# Patient Record
Sex: MALE | Race: WHITE | Employment: OTHER | URBAN - METROPOLITAN AREA
[De-identification: names, ages, dates, MRNs, and addresses within clinical notes are randomized per-mention and may not be internally consistent; named-entity substitution may affect disease eponyms.]

---

## 2017-04-01 ENCOUNTER — HOSPITAL ENCOUNTER (INPATIENT)
Age: 82
LOS: 6 days | Discharge: NURSING, REHAB OR LONG-TERM CARE | DRG: 481 | End: 2017-04-07
Attending: EMERGENCY MEDICINE | Admitting: INTERNAL MEDICINE
Payer: MEDICARE

## 2017-04-01 ENCOUNTER — APPOINTMENT (OUTPATIENT)
Dept: GENERAL RADIOLOGY | Age: 82
DRG: 481 | End: 2017-04-01
Attending: EMERGENCY MEDICINE
Payer: MEDICARE

## 2017-04-01 DIAGNOSIS — N19 RENAL FAILURE: ICD-10-CM

## 2017-04-01 DIAGNOSIS — N39.0 URINARY TRACT INFECTION WITHOUT HEMATURIA, SITE UNSPECIFIED: Primary | ICD-10-CM

## 2017-04-01 DIAGNOSIS — R60.0 BILATERAL EDEMA OF LOWER EXTREMITY: ICD-10-CM

## 2017-04-01 PROBLEM — N17.9 ACUTE KIDNEY INJURY (HCC): Status: ACTIVE | Noted: 2017-04-01

## 2017-04-01 LAB
ALBUMIN SERPL BCP-MCNC: 2.7 G/DL (ref 3.5–5)
ALBUMIN/GLOB SERPL: 0.6 {RATIO} (ref 1.1–2.2)
ALP SERPL-CCNC: 152 U/L (ref 45–117)
ALT SERPL-CCNC: 18 U/L (ref 12–78)
ANION GAP BLD CALC-SCNC: 11 MMOL/L (ref 5–15)
APPEARANCE UR: ABNORMAL
AST SERPL W P-5'-P-CCNC: 27 U/L (ref 15–37)
ATRIAL RATE: 74 BPM
BACTERIA URNS QL MICRO: ABNORMAL /HPF
BASOPHILS # BLD AUTO: 0.1 K/UL (ref 0–0.1)
BASOPHILS # BLD: 1 % (ref 0–1)
BILIRUB SERPL-MCNC: 0.9 MG/DL (ref 0.2–1)
BILIRUB UR QL: NEGATIVE
BNP SERPL-MCNC: 117 PG/ML (ref 0–100)
BUN SERPL-MCNC: 50 MG/DL (ref 6–20)
BUN/CREAT SERPL: 24 (ref 12–20)
CALCIUM SERPL-MCNC: 8.3 MG/DL (ref 8.5–10.1)
CALCULATED P AXIS, ECG09: 46 DEGREES
CALCULATED R AXIS, ECG10: -41 DEGREES
CALCULATED T AXIS, ECG11: 13 DEGREES
CHLORIDE SERPL-SCNC: 111 MMOL/L (ref 97–108)
CK SERPL-CCNC: 259 U/L (ref 39–308)
CO2 SERPL-SCNC: 23 MMOL/L (ref 21–32)
COLOR UR: ABNORMAL
CREAT SERPL-MCNC: 2.08 MG/DL (ref 0.7–1.3)
DIAGNOSIS, 93000: NORMAL
DIFFERENTIAL METHOD BLD: ABNORMAL
EOSINOPHIL # BLD: 0 K/UL (ref 0–0.4)
EOSINOPHIL NFR BLD: 0 % (ref 0–7)
EPITH CASTS URNS QL MICRO: ABNORMAL /LPF
ERYTHROCYTE [DISTWIDTH] IN BLOOD BY AUTOMATED COUNT: 21.1 % (ref 11.5–14.5)
GLOBULIN SER CALC-MCNC: 4.5 G/DL (ref 2–4)
GLUCOSE SERPL-MCNC: 97 MG/DL (ref 65–100)
GLUCOSE UR STRIP.AUTO-MCNC: NEGATIVE MG/DL
HCT VFR BLD AUTO: 32.2 % (ref 36.6–50.3)
HGB BLD-MCNC: 10.1 G/DL (ref 12.1–17)
HGB UR QL STRIP: ABNORMAL
KETONES UR QL STRIP.AUTO: NEGATIVE MG/DL
LEUKOCYTE ESTERASE UR QL STRIP.AUTO: ABNORMAL
LYMPHOCYTES # BLD AUTO: 13 % (ref 12–49)
LYMPHOCYTES # BLD: 1.4 K/UL (ref 0.8–3.5)
MCH RBC QN AUTO: 29.2 PG (ref 26–34)
MCHC RBC AUTO-ENTMCNC: 31.4 G/DL (ref 30–36.5)
MCV RBC AUTO: 93.1 FL (ref 80–99)
MONOCYTES # BLD: 1.8 K/UL (ref 0–1)
MONOCYTES NFR BLD AUTO: 17 % (ref 5–13)
NEUTS SEG # BLD: 7.2 K/UL (ref 1.8–8)
NEUTS SEG NFR BLD AUTO: 69 % (ref 32–75)
NITRITE UR QL STRIP.AUTO: NEGATIVE
P-R INTERVAL, ECG05: 158 MS
PH UR STRIP: 5.5 [PH] (ref 5–8)
PLATELET # BLD AUTO: 333 K/UL (ref 150–400)
POTASSIUM SERPL-SCNC: 4.2 MMOL/L (ref 3.5–5.1)
PROT SERPL-MCNC: 7.2 G/DL (ref 6.4–8.2)
PROT UR STRIP-MCNC: 30 MG/DL
Q-T INTERVAL, ECG07: 430 MS
QRS DURATION, ECG06: 128 MS
QTC CALCULATION (BEZET), ECG08: 477 MS
RBC # BLD AUTO: 3.46 M/UL (ref 4.1–5.7)
RBC #/AREA URNS HPF: ABNORMAL /HPF (ref 0–5)
RBC MORPH BLD: ABNORMAL
RBC MORPH BLD: ABNORMAL
SODIUM SERPL-SCNC: 145 MMOL/L (ref 136–145)
SP GR UR REFRACTOMETRY: 1.01 (ref 1–1.03)
TROPONIN I SERPL-MCNC: <0.04 NG/ML
UROBILINOGEN UR QL STRIP.AUTO: 0.2 EU/DL (ref 0.2–1)
VENTRICULAR RATE, ECG03: 74 BPM
WBC # BLD AUTO: 10.5 K/UL (ref 4.1–11.1)
WBC URNS QL MICRO: >100 /HPF (ref 0–4)

## 2017-04-01 PROCEDURE — 74011250636 HC RX REV CODE- 250/636: Performed by: EMERGENCY MEDICINE

## 2017-04-01 PROCEDURE — 36415 COLL VENOUS BLD VENIPUNCTURE: CPT | Performed by: EMERGENCY MEDICINE

## 2017-04-01 PROCEDURE — 94762 N-INVAS EAR/PLS OXIMTRY CONT: CPT

## 2017-04-01 PROCEDURE — 65270000032 HC RM SEMIPRIVATE

## 2017-04-01 PROCEDURE — 80053 COMPREHEN METABOLIC PANEL: CPT | Performed by: EMERGENCY MEDICINE

## 2017-04-01 PROCEDURE — 83880 ASSAY OF NATRIURETIC PEPTIDE: CPT | Performed by: EMERGENCY MEDICINE

## 2017-04-01 PROCEDURE — 81001 URINALYSIS AUTO W/SCOPE: CPT | Performed by: EMERGENCY MEDICINE

## 2017-04-01 PROCEDURE — 74011250637 HC RX REV CODE- 250/637: Performed by: INTERNAL MEDICINE

## 2017-04-01 PROCEDURE — 93970 EXTREMITY STUDY: CPT

## 2017-04-01 PROCEDURE — 74011000258 HC RX REV CODE- 258: Performed by: EMERGENCY MEDICINE

## 2017-04-01 PROCEDURE — 74011250636 HC RX REV CODE- 250/636: Performed by: INTERNAL MEDICINE

## 2017-04-01 PROCEDURE — 82550 ASSAY OF CK (CPK): CPT | Performed by: EMERGENCY MEDICINE

## 2017-04-01 PROCEDURE — 74011000258 HC RX REV CODE- 258: Performed by: INTERNAL MEDICINE

## 2017-04-01 PROCEDURE — 99285 EMERGENCY DEPT VISIT HI MDM: CPT

## 2017-04-01 PROCEDURE — 71010 XR CHEST PORT: CPT

## 2017-04-01 PROCEDURE — 84484 ASSAY OF TROPONIN QUANT: CPT | Performed by: EMERGENCY MEDICINE

## 2017-04-01 PROCEDURE — 93005 ELECTROCARDIOGRAM TRACING: CPT

## 2017-04-01 PROCEDURE — 85025 COMPLETE CBC W/AUTO DIFF WBC: CPT | Performed by: EMERGENCY MEDICINE

## 2017-04-01 RX ORDER — DOCUSATE SODIUM 100 MG/1
100 CAPSULE, LIQUID FILLED ORAL DAILY
COMMUNITY
End: 2018-01-31

## 2017-04-01 RX ORDER — FLUTICASONE PROPIONATE 50 MCG
2 SPRAY, SUSPENSION (ML) NASAL DAILY
COMMUNITY

## 2017-04-01 RX ORDER — ZOLPIDEM TARTRATE 5 MG/1
5 TABLET ORAL
Status: DISCONTINUED | OUTPATIENT
Start: 2017-04-01 | End: 2017-04-07 | Stop reason: HOSPADM

## 2017-04-01 RX ORDER — CLOBETASOL PROPIONATE 0.5 MG/G
CREAM TOPICAL 2 TIMES DAILY
COMMUNITY
End: 2017-07-22

## 2017-04-01 RX ORDER — DOCUSATE SODIUM 100 MG/1
100 CAPSULE, LIQUID FILLED ORAL DAILY
Status: DISCONTINUED | OUTPATIENT
Start: 2017-04-02 | End: 2017-04-07 | Stop reason: HOSPADM

## 2017-04-01 RX ORDER — ACETAMINOPHEN 325 MG/1
500 TABLET ORAL 2 TIMES DAILY
COMMUNITY
End: 2018-01-31

## 2017-04-01 RX ORDER — SODIUM CHLORIDE 0.9 % (FLUSH) 0.9 %
5-10 SYRINGE (ML) INJECTION EVERY 8 HOURS
Status: DISCONTINUED | OUTPATIENT
Start: 2017-04-01 | End: 2017-04-07 | Stop reason: HOSPADM

## 2017-04-01 RX ORDER — TRIAZOLAM 0.12 MG/1
0.12 TABLET ORAL
COMMUNITY
End: 2017-07-22

## 2017-04-01 RX ORDER — SODIUM CHLORIDE 0.9 % (FLUSH) 0.9 %
5-10 SYRINGE (ML) INJECTION AS NEEDED
Status: DISCONTINUED | OUTPATIENT
Start: 2017-04-01 | End: 2017-04-07 | Stop reason: HOSPADM

## 2017-04-01 RX ORDER — HEPARIN SODIUM 5000 [USP'U]/ML
5000 INJECTION, SOLUTION INTRAVENOUS; SUBCUTANEOUS EVERY 8 HOURS
Status: DISCONTINUED | OUTPATIENT
Start: 2017-04-01 | End: 2017-04-05 | Stop reason: SDUPTHER

## 2017-04-01 RX ORDER — ACETAMINOPHEN 500 MG
500 TABLET ORAL DAILY
COMMUNITY
End: 2017-07-13 | Stop reason: SDUPTHER

## 2017-04-01 RX ORDER — ROSUVASTATIN CALCIUM 10 MG/1
20 TABLET, COATED ORAL
Status: DISCONTINUED | OUTPATIENT
Start: 2017-04-01 | End: 2017-04-07 | Stop reason: HOSPADM

## 2017-04-01 RX ORDER — PANTOPRAZOLE SODIUM 40 MG/1
40 TABLET, DELAYED RELEASE ORAL
Status: DISCONTINUED | OUTPATIENT
Start: 2017-04-01 | End: 2017-04-07 | Stop reason: HOSPADM

## 2017-04-01 RX ORDER — ACETAMINOPHEN 325 MG/1
650 TABLET ORAL
Status: DISCONTINUED | OUTPATIENT
Start: 2017-04-01 | End: 2017-04-06

## 2017-04-01 RX ORDER — FLUTICASONE PROPIONATE 50 MCG
2 SPRAY, SUSPENSION (ML) NASAL DAILY
Status: DISCONTINUED | OUTPATIENT
Start: 2017-04-02 | End: 2017-04-07 | Stop reason: HOSPADM

## 2017-04-01 RX ORDER — MENTHOL AND ZINC OXIDE .44; 20.625 G/100G; G/100G
OINTMENT TOPICAL AS NEEDED
COMMUNITY

## 2017-04-01 RX ORDER — LANOLIN ALCOHOL/MO/W.PET/CERES
325 CREAM (GRAM) TOPICAL 2 TIMES DAILY WITH MEALS
Status: DISCONTINUED | OUTPATIENT
Start: 2017-04-01 | End: 2017-04-07 | Stop reason: HOSPADM

## 2017-04-01 RX ORDER — METOPROLOL TARTRATE 25 MG/1
12.5 TABLET, FILM COATED ORAL 2 TIMES DAILY
Status: DISCONTINUED | OUTPATIENT
Start: 2017-04-01 | End: 2017-04-07 | Stop reason: HOSPADM

## 2017-04-01 RX ADMIN — SODIUM CHLORIDE 2000 ML: 900 INJECTION, SOLUTION INTRAVENOUS at 15:13

## 2017-04-01 RX ADMIN — HEPARIN SODIUM 5000 UNITS: 5000 INJECTION, SOLUTION INTRAVENOUS; SUBCUTANEOUS at 16:29

## 2017-04-01 RX ADMIN — ROSUVASTATIN CALCIUM 20 MG: 10 TABLET, FILM COATED ORAL at 21:23

## 2017-04-01 RX ADMIN — PANTOPRAZOLE SODIUM 40 MG: 40 TABLET, DELAYED RELEASE ORAL at 18:29

## 2017-04-01 RX ADMIN — Medication 10 ML: at 16:30

## 2017-04-01 RX ADMIN — ZOLPIDEM TARTRATE 5 MG: 5 TABLET, FILM COATED ORAL at 21:23

## 2017-04-01 RX ADMIN — HEPARIN SODIUM 5000 UNITS: 5000 INJECTION, SOLUTION INTRAVENOUS; SUBCUTANEOUS at 23:53

## 2017-04-01 RX ADMIN — FERROUS SULFATE TAB 325 MG (65 MG ELEMENTAL FE) 325 MG: 325 (65 FE) TAB at 18:29

## 2017-04-01 RX ADMIN — CEFTRIAXONE 1 G: 1 INJECTION, POWDER, FOR SOLUTION INTRAMUSCULAR; INTRAVENOUS at 16:29

## 2017-04-01 RX ADMIN — PIPERACILLIN SODIUM AND TAZOBACTAM SODIUM 3.38 G: 3; .375 INJECTION, POWDER, LYOPHILIZED, FOR SOLUTION INTRAVENOUS at 15:15

## 2017-04-01 RX ADMIN — Medication 10 ML: at 21:25

## 2017-04-01 RX ADMIN — ACETAMINOPHEN 650 MG: 325 TABLET, FILM COATED ORAL at 18:34

## 2017-04-01 NOTE — ED TRIAGE NOTES
Per EMS pt slept in his recliner all night because he was unable to get comfortable in the bed due to new mattress. Pt presents with bilateral leg and feet swelling and C/O pain to right leg. Pt denies feeling SOB or having chest pain. Pt arrived with condom cath that has fallen off. Pt urine is foul smelling.

## 2017-04-01 NOTE — H&P
History and Physical    Subjective:     Patient is an 45-year-old white male who lives in assisted living with his wife. He states that he does not walk at all, and uses a wheelchair. Patient presents with complaints of right leg pain. Patient endorses that he has trouble moving his legs due to chronic swelling/lymphedema. Patient complains of right leg pain from knee to ankle, which appears to be chronic since right knee replacement in 2014, perhaps a little worse than baseline. He states that his appetite is good. Patient denies chest pain, abdominal pain, nausea, vomiting, diarrhea. Past Medical History:   Diagnosis Date    Arrhythmia     Arthritis     Anemia of iron deficiency and chronic disease     Hypercholesteremia     Hypertension     Chronic kidney disease, stage III. Baseline creatinine appears to be about 1.7-1.8     GERD       Past Surgical History:   Procedure Laterality Date    HX BACK SURGERY  1974    HX left CATARACT REMOVAL      HX COLONOSCOPY      HX HERNIA REPAIR, double inguinal  1975    HX TONSILLECTOMY      UPPER GI ENDOSCOPY,BIOPSY  3/22/2013          Family History   Problem Relation Age of Onset    Stroke Mother     Heart Disease Father     Cancer Brother      lung    Other Brother      NON ALCOHOLIC CIRRHOSIS      Social History   Substance Use Topics    Smoking status: Former Smoker     Years: 0.5 ppd x 20 years     Quit date: 1976    Smokeless tobacco: Never    Alcohol use No    Recreational drug use Never       Prior to Admission medications    Medication Sig Start Date End Date Taking? Authorizing Provider   amitriptyline (ELAVIL) 25 mg tablet Take 25 mg by mouth two (2) times a day. Historical Provider   ferrous sulfate 325 mg (65 mg iron) tablet Take 1 tablet by mouth two (2) times daily (with meals). 1/28/15   Reese JAMES Do, MD   pantoprazole (PROTONIX) 40 mg tablet Take 1 tablet by mouth Before breakfast and dinner.   Patient taking differently: Take 40 mg by mouth daily. 1/28/15   Reese JAMES Do, MD   metoprolol (LOPRESSOR) 25 mg tablet Take 12.5 mg by mouth two (2) times a day. Historical Provider   rosuvastatin (CRESTOR) 20 mg tablet Take 20 mg by mouth nightly. Historical Provider   triazolam (HALCION) 0.25 mg tablet Take 0.125 mg by mouth nightly. Historical Provider   mometasone (NASONEX) 50 mcg/actuation nasal spray 2 sprays by Both Nostrils route daily as needed. Historical Provider     No Known Allergies     Review of Systems:  A comprehensive review of systems was negative except for that written in the History of Present Illness. Objective: Intake and Output:            Physical Exam:   Visit Vitals    /65    Pulse 96    Temp 98.4 °F (36.9 °C)    Resp 18    Ht 5' 7\" (1.702 m)    Wt 68 kg (150 lb)    SpO2 97%    BMI 23.49 kg/m2     General:  Alert, cooperative, no distress, appears stated age. Head:  Normocephalic, without obvious abnormality, atraumatic. Eyes:  Conjunctivae/corneas clear. PERRL, EOMs intact. Nose: Nares normal. Septum midline. Mucosa normal. No drainage or sinus tenderness. Throat: Lips, mucosa, and tongue normal. Teeth and gums normal.   Neck: Supple, symmetrical, trachea midline, no adenopathy, thyroid: no enlargement/tenderness/nodules, no carotid bruit and no JVD. Lungs:   Clear to auscultation bilaterally. Chest wall:  No tenderness or deformity. Heart:  Regular rate and rhythm, S1, S2 normal, no murmur, click, rub or gallop. Abdomen:   Soft, non-tender. Bowel sounds normal. No masses,  No organomegaly. Extremities: 2+ pitting edema in the lower extremities; no cyanosis or clubbing. Feet are warm to touch   Pulses: 2+ and symmetric all extremities. Skin: Skin turgor normal. Pale. Lymph nodes: Cervical, supraclavicular, and axillary nodes normal.   Neurologic: CNII-XII intact. Patient is following commands and answering questions appropriately.  He demonstrates 2/5 strength in the lower extremities and 5/5 strength in upper extremities. No dysarthria. Data Review:          Result Value    WBC 10.5    RBC 3.46 (L)    HGB 10.1 (L)    HCT 32.2 (L)    MCV 93.1    MCH 29.2    MCHC 31.4    RDW 21.1 (H)    PLATELET 398    NEUTROPHILS 69    LYMPHOCYTES 13    MONOCYTES 17 (H)    EOSINOPHILS 0    BASOPHILS 1    ABS. NEUTROPHILS 7.2    ABS. LYMPHOCYTES 1.4    ABS. MONOCYTES 1.8 (H)    ABS. EOSINOPHILS 0.0    ABS. BASOPHILS 0.1    DF SMEAR SCANNED    RBC COMMENTS ANISOCYTOSIS  2+      RBC COMMENTS OVALOCYTES  PRESENT        Result Value    Sodium 145    Potassium 4.2    Chloride 111 (H)    CO2 23    Anion gap 11    Glucose 97    BUN 50 (H)    Creatinine 2.08 (H)    BUN/Creatinine ratio 24 (H)    GFR est AA 37 (L)    GFR est non-AA 31 (L)    Calcium 8.3 (L)    Bilirubin, total 0.9    ALT (SGPT) 18    AST (SGOT) 27    Alk. phosphatase 152 (H)    Protein, total 7.2    Albumin 2.7 (L)    Globulin 4.5 (H)    A-G Ratio 0.6 (L)      Result Value    Troponin-I, Qt. <0.04      Result Value          Result Value    Color YELLOW/STRAW    Appearance TURBID (A)    Specific gravity 1.015    pH (UA) 5.5    Protein 30 (A)    Glucose NEGATIVE     Ketone NEGATIVE     Bilirubin NEGATIVE     Blood MODERATE (A)    Urobilinogen 0.2    Nitrites NEGATIVE     Leukocyte Esterase LARGE (A)    WBC >100 (H)    RBC 0-5    Epithelial cells MODERATE (A)    Bacteria 1+ (A)      Result Value     (H)     Chest xray, as reviewed and visualized: no acute cardiopulmonary process. Echocardiogram of November, 2016:  Left ventricle size and  systolic function was normal. Left ventricle ejection fraction was estimated in the range of 55 % to 60 %. Suboptimal endocardial visualization limits wall motion analysis. Wall thickness was normal. RIGHT VENTRICLE size, wall thickness and  systolic function was normal. Both atria have normal size.  MITRAL VALVE has mild annular calcification, normal valve structure, and  normal leaflet separation. There was no evidence for stenosis or regurgitation. AORTIC VALVE was trileaflet, exhibited normal thickness and normal cuspal separation. There was no aortic stenosis or regurgitation. TRICUSPID VALVE has normal valve structure, exhibiting  normal leaflet separation. There was no evidence for tricuspid stenosis or regurgitation. PULMONIC VALVE leaflets exhibited normal thickness, no calcification, and normal cuspal separation. There was no pulmonic regurgitation. Aortic root exhibited normal size. There was no pericardial effusion. Ultrasound of bilateral lower extremities: no acute deep in thrombosis. Assessment:         Plan:     Assessment/plan:  1. Urinary tract infection. IV ceftriaxone. 2.  Chronic kidney disease, stage III. Renal function is slightly worse than baseline. However, due to edema in the lower extremities, no IV fluids are started. Patient may still be intravascularly depleted. He endorses good appetite. Continue to monitor renal function. 3. Chronic lymphedema of the lower extremities. Ultrasound has ruled out acute deep in thrombosis. Echocardiogram from a few months ago (November, 2016) is essentially normal. ProBNP is in normal range. This is not related to heart failure. 4. Deconditioning. Consider physical therapy. However, it is doubtful how much progress patient will make with long-term immobility/stability. 5. Anemia of chronic disease. No indication for transfusion at this time.     Signed By: Fadumo Tian DO     April 1, 2017

## 2017-04-01 NOTE — ED NOTES
Pt noted to have sats 78% while asleep. Pt awakens easily and sats begin to rise. O2 applied at 2L/nc. Continue to monitor.

## 2017-04-01 NOTE — ED PROVIDER NOTES
HPI Comments: [de-identified] y.o. male with past medical history significant for Hypertension, Arthritis, and Hypercholesterolemia who presents from Assisted Living via EMS with chief complaint of Right Leg Pain. Per EMS patient slept in his recliner all night because he was unable to get comfortable in the bed due to new mattress. Patient states he has been sleeping in this chair \"for several weeks\". Patient states constant right lower leg pain described as \"aching\". Pt states associated bilateral leg and feet swelling. Patient states he ambulates with the assistance of a wheel chair at baseline. Patient has a previous history of right knee replacement in 2014. Pt reports recent onset of increased weakness in his bilateral legs described as \"now no longer able to stand\". Pt denies fever, chills, cough, congestion, SOB, chest pain, abdominal pain, nausea, vomiting, diarrhea, difficulty urinating, or dysuria. Pt denies any other acute medical complaints. There are no other acute medical concerns at this time. PCP: Brodie Gil MD    Note written by Isabel Gagnon, as dictated by Jess Stephen MD 1:48 PM    The history is provided by the patient.         Past Medical History:   Diagnosis Date    Arrhythmia     RAPID HEARTBEAT    Arthritis     Constipation     Hearing loss     Heart disease     Hypercholesteremia     Hypertension     Joint pain     Memory disorder        Past Surgical History:   Procedure Laterality Date    HX BACK SURGERY  1974    HX CATARACT REMOVAL      LEFT    HX COLONOSCOPY      HX HERNIA REPAIR  1975    double    HX TONSILLECTOMY      UPPER GI ENDOSCOPY,BIOPSY  3/22/2013              Family History:   Problem Relation Age of Onset    Stroke Mother     Heart Disease Father     Cancer Brother      lung    Other Brother      NON ALCOHOLIC CIRRHOSIS       Social History     Social History    Marital status:      Spouse name: N/A    Number of children: N/A    Years of education: N/A     Occupational History    Not on file. Social History Main Topics    Smoking status: Former Smoker     Years: 16.00     Quit date: 1/23/1971    Smokeless tobacco: Not on file    Alcohol use No    Drug use: No    Sexual activity: Not on file     Other Topics Concern    Not on file     Social History Narrative         ALLERGIES: Review of patient's allergies indicates no known allergies. Review of Systems   Constitutional: Negative for chills and fever. HENT: Negative for congestion. Respiratory: Negative for cough and shortness of breath. Cardiovascular: Positive for leg swelling. Negative for chest pain. Gastrointestinal: Negative for abdominal pain, diarrhea, nausea and vomiting. Genitourinary: Negative for difficulty urinating and dysuria. Musculoskeletal: Positive for arthralgias. Neurological: Positive for weakness. All other systems reviewed and are negative. Vitals:    04/01/17 1326 04/01/17 1330 04/01/17 1345 04/01/17 1400   BP: 134/49 127/50 122/49 117/47   Pulse: 80 79 79 85   Resp: 14 23 30 20   Temp: 97.8 °F (36.6 °C)      SpO2: 97% 97% 95% (!) 78%   Weight: 68 kg (150 lb)      Height: 5' 7\" (1.702 m)               Physical Exam   Constitutional: He is oriented to person, place, and time. He appears well-developed and well-nourished. No distress. HENT:   Head: Normocephalic. Mouth/Throat: Mucous membranes are dry. Eyes: Pupils are equal, round, and reactive to light. Neck: Normal range of motion. Cardiovascular: Normal rate and regular rhythm. No murmur heard. Pulmonary/Chest: Effort normal and breath sounds normal. No respiratory distress. Decreased breath sounds bilaterally   Abdominal: Soft. There is no tenderness. Musculoskeletal: Normal range of motion. He exhibits edema. 3+ pitting edema bilaterally. Pulses intact   Neurological: He is alert and oriented to person, place, and time. He has normal strength.  No cranial nerve deficit. Skin: Skin is warm and dry. There is pallor. Psychiatric: He has a normal mood and affect. His behavior is normal.   Nursing note and vitals reviewed. Note written by Isabel Maldonado, as dictated by Kenzie Szymanski MD 1:49 PM      [facility]  ED Course       Procedures    ED EKG interpretation:  Rhythm: normal sinus rhythm; and regular . Rate (approx.): 74bpm; Axis: normal; ST/T wave: normal; No acute ST changes. No significant changes when compared to EKG 1/2  Note written by Isabel Maldonado, as dictated by Kenzie Szymanski MD 1:49 PM    CONSULT NOTE:  2:24 PM Kenzie Szymanski MD spoke with Dr. Trey Louie, Consult for Hospitalist.  Discussed available diagnostic tests and clinical findings. He is in agreement with care plans as outlined. Dr. Trey Louie will see and admit patient.

## 2017-04-01 NOTE — PROGRESS NOTES
Admission Medication Reconciliation:    Information obtained from:  Medication list provided by [facility] (dated 2017, reviewed on 3/29/2017 by nursing)    Comments/Recommendations: Added:  APAP 500 daily, Clobetasol cream, Docusate, Flonase, Risamine ointment  Removed:  Amitriptyline 25mg BID  Changed:  none    Prior to Admission Medications:   Prior to Admission Medications   Prescriptions Last Dose Informant Patient Reported? Taking? Menthol-Zinc Oxide (RISAMINE) 0.44-20.6 % oint   Yes Yes   Sig: Apply  to affected area as needed (with each diaper change). Indications: DIAPER RASH, SKIN IRRITATION   acetaminophen (TYLENOL) 325 mg tablet   Yes Yes   Sig: Take 650 mg by mouth every eight (8) hours as needed for Pain. acetaminophen (TYLENOL) 500 mg tablet   Yes Yes   Sig: Take 500 mg by mouth daily. clobetasol (TEMOVATE) 0.05 % topical cream   Yes Yes   Sig: Apply  to affected area two (2) times a day. Apply to leg rash. docusate sodium (COLACE) 100 mg capsule   Yes Yes   Sig: Take 100 mg by mouth daily. ferrous sulfate 325 mg (65 mg iron) tablet   No Yes   Sig: Take 1 tablet by mouth two (2) times daily (with meals). fluticasone (FLONASE) 50 mcg/actuation nasal spray   Yes Yes   Si Sprays by Both Nostrils route daily. metoprolol (LOPRESSOR) 25 mg tablet   Yes Yes   Sig: Take 12.5 mg by mouth two (2) times a day. pantoprazole (PROTONIX) 40 mg tablet   No Yes   Sig: Take 1 tablet by mouth Before breakfast and dinner. rosuvastatin (CRESTOR) 20 mg tablet   Yes Yes   Sig: Take 20 mg by mouth nightly. triazolam (HALCION) 0.125 mg tablet   Yes Yes   Sig: Take 0.125 mg by mouth nightly.       Facility-Administered Medications: None

## 2017-04-01 NOTE — ROUTINE PROCESS
TRANSFER - OUT REPORT:    Verbal report given to Mikey Arias RN(name) on Aidan Matthew  being transferred to 5E(unit) for routine progression of care       Report consisted of patients Situation, Background, Assessment and   Recommendations(SBAR). Information from the following report(s) SBAR, ED Summary, [facility] and Recent Results was reviewed with the receiving nurse. Lines:       Opportunity for questions and clarification was provided.       Patient transported with:   [employer]

## 2017-04-01 NOTE — PROCEDURES
[facility]  *** FINAL REPORT ***    Name: Lion Chen  MRN: [id number]  : 13 Sep 1935  HIS Order #: [id number]  [address] [facility] Visit #: [id number]  Date: 2017    TYPE OF TEST: Peripheral Venous Testing    REASON FOR TEST  Limb swelling    Right Leg:-  Deep venous thrombosis:           No  Superficial venous thrombosis:    No  Deep venous insufficiency:        Not examined  Superficial venous insufficiency: Not examined    Left Leg:-  Deep venous thrombosis:           No  Superficial venous thrombosis:    No  Deep venous insufficiency:        Not examined  Superficial venous insufficiency: Not examined      INTERPRETATION/FINDINGS  PROCEDURE:  Color duplex ultrasound imaging of lower extremity veins. FINDINGS:       Right: The common femoral, deep femoral, femoral, popliteal,  posterior tibial and great saphenous are patent and without evidence  of thrombus;  each is fully compressible and there is no narrowing of  the flow channel on color Doppler imaging. The peroneal vein was not  visualized. Phasic flow is observed in the common femoral vein. Left:   The common femoral, deep femoral, femoral, popliteal,  posterior tibial, peroneal and great saphenous are patent and without  evidence of thrombus;  each is fully compressible and there is no  narrowing of the flow channel on color Doppler imaging. Phasic flow  is observed in the common femoral vein. IMPRESSION:  No evidence of right or left lower extremity vein  thrombosis where visualized. ADDITIONAL COMMENTS    I have personally reviewed the data relevant to the interpretation of  this  study.     TECHNOLOGIST: Edouard Hardy RVT  Signed: 2017 02:31 PM    PHYSICIAN: Kris Newsome MD  Signed: 2017 07:01 AM

## 2017-04-01 NOTE — PROGRESS NOTES
Primary Nurse Parish Valladares and Scotty Sosa RN performed a dual skin assessment on this patient Impairment noted- see wound doc flow sheet. Pt has open areas on sacrum with mepilex dressings from ED, as well as excoriation on penis (per report, pt wore condom catheters at home). Wound consult placed.    Waldo score is 15

## 2017-04-01 NOTE — PROGRESS NOTES
Pt arrived from ED at approximately 1545. Alert, oriented x4, flat affect. Vitals stable, with the exception of slightly low BP 98/42. Pt finishing up 2L bolus started in ED. Pt wearing 2L O2 via nasal cannula, due to desats during sleep per ED nurse. Legs are edematous, elevated on pillows. Pt ordered for turn team q2h, as well as wound consult for areas of breakdown on sacrum/coccyx. Diapered due to incontinence. Continues on IV antibiotics for UTI. Will continue to monitor pt closely.

## 2017-04-02 LAB
ALBUMIN SERPL BCP-MCNC: 2 G/DL (ref 3.5–5)
ALBUMIN/GLOB SERPL: 0.6 {RATIO} (ref 1.1–2.2)
ALP SERPL-CCNC: 108 U/L (ref 45–117)
ALT SERPL-CCNC: 15 U/L (ref 12–78)
ANION GAP BLD CALC-SCNC: 9 MMOL/L (ref 5–15)
APPEARANCE UR: ABNORMAL
AST SERPL W P-5'-P-CCNC: 29 U/L (ref 15–37)
BACTERIA URNS QL MICRO: NEGATIVE /HPF
BASOPHILS # BLD AUTO: 0 K/UL (ref 0–0.1)
BASOPHILS # BLD: 0 % (ref 0–1)
BILIRUB SERPL-MCNC: 0.4 MG/DL (ref 0.2–1)
BILIRUB UR QL: NEGATIVE
BUN SERPL-MCNC: 48 MG/DL (ref 6–20)
BUN/CREAT SERPL: 26 (ref 12–20)
CALCIUM SERPL-MCNC: 7.6 MG/DL (ref 8.5–10.1)
CHLORIDE SERPL-SCNC: 117 MMOL/L (ref 97–108)
CO2 SERPL-SCNC: 22 MMOL/L (ref 21–32)
COLOR UR: ABNORMAL
CREAT SERPL-MCNC: 1.88 MG/DL (ref 0.7–1.3)
DIFFERENTIAL METHOD BLD: ABNORMAL
EOSINOPHIL # BLD: 0.1 K/UL (ref 0–0.4)
EOSINOPHIL NFR BLD: 1 % (ref 0–7)
EPITH CASTS URNS QL MICRO: ABNORMAL /LPF
ERYTHROCYTE [DISTWIDTH] IN BLOOD BY AUTOMATED COUNT: 21.4 % (ref 11.5–14.5)
FERRITIN SERPL-MCNC: 906 NG/ML (ref 26–388)
GLOBULIN SER CALC-MCNC: 3.3 G/DL (ref 2–4)
GLUCOSE SERPL-MCNC: 80 MG/DL (ref 65–100)
GLUCOSE UR STRIP.AUTO-MCNC: NEGATIVE MG/DL
HCT VFR BLD AUTO: 24.4 % (ref 36.6–50.3)
HCT VFR BLD AUTO: 24.5 % (ref 36.6–50.3)
HCT VFR BLD AUTO: 26.9 % (ref 36.6–50.3)
HGB BLD-MCNC: 7.7 G/DL (ref 12.1–17)
HGB BLD-MCNC: 7.7 G/DL (ref 12.1–17)
HGB BLD-MCNC: 8.5 G/DL (ref 12.1–17)
HGB UR QL STRIP: ABNORMAL
HYALINE CASTS URNS QL MICRO: ABNORMAL /LPF (ref 0–5)
IRON SATN MFR SERPL: 13 % (ref 20–50)
IRON SERPL-MCNC: 17 UG/DL (ref 35–150)
KETONES UR QL STRIP.AUTO: NEGATIVE MG/DL
LEUKOCYTE ESTERASE UR QL STRIP.AUTO: ABNORMAL
LYMPHOCYTES # BLD AUTO: 10 % (ref 12–49)
LYMPHOCYTES # BLD: 0.8 K/UL (ref 0.8–3.5)
MCH RBC QN AUTO: 29.2 PG (ref 26–34)
MCHC RBC AUTO-ENTMCNC: 31.4 G/DL (ref 30–36.5)
MCV RBC AUTO: 92.8 FL (ref 80–99)
MONOCYTES # BLD: 0.7 K/UL (ref 0–1)
MONOCYTES NFR BLD AUTO: 9 % (ref 5–13)
NEUTS BAND NFR BLD MANUAL: 3 % (ref 0–6)
NEUTS SEG # BLD: 6.2 K/UL (ref 1.8–8)
NEUTS SEG NFR BLD AUTO: 77 % (ref 32–75)
NITRITE UR QL STRIP.AUTO: NEGATIVE
PH UR STRIP: 5.5 [PH] (ref 5–8)
PLATELET # BLD AUTO: 277 K/UL (ref 150–400)
POTASSIUM SERPL-SCNC: 4 MMOL/L (ref 3.5–5.1)
PROT SERPL-MCNC: 5.3 G/DL (ref 6.4–8.2)
PROT UR STRIP-MCNC: ABNORMAL MG/DL
RBC # BLD AUTO: 2.64 M/UL (ref 4.1–5.7)
RBC #/AREA URNS HPF: ABNORMAL /HPF (ref 0–5)
RBC MORPH BLD: ABNORMAL
RETICS/RBC NFR AUTO: 1.5 % (ref 0.7–2.1)
SODIUM SERPL-SCNC: 148 MMOL/L (ref 136–145)
SP GR UR REFRACTOMETRY: 1.02 (ref 1–1.03)
TIBC SERPL-MCNC: 131 UG/DL (ref 250–450)
UA: UC IF INDICATED,UAUC: ABNORMAL
UROBILINOGEN UR QL STRIP.AUTO: 0.2 EU/DL (ref 0.2–1)
WBC # BLD AUTO: 7.8 K/UL (ref 4.1–11.1)
WBC URNS QL MICRO: >100 /HPF (ref 0–4)

## 2017-04-02 PROCEDURE — 77030011943

## 2017-04-02 PROCEDURE — 85025 COMPLETE CBC W/AUTO DIFF WBC: CPT

## 2017-04-02 PROCEDURE — 87086 URINE CULTURE/COLONY COUNT: CPT | Performed by: HOSPITALIST

## 2017-04-02 PROCEDURE — 77030011256 HC DRSG MEPILEX <16IN NO BORD MOLN -A

## 2017-04-02 PROCEDURE — 74011250636 HC RX REV CODE- 250/636: Performed by: INTERNAL MEDICINE

## 2017-04-02 PROCEDURE — 77030018846 HC SOL IRR STRL H20 ICUM -A

## 2017-04-02 PROCEDURE — 80053 COMPREHEN METABOLIC PANEL: CPT

## 2017-04-02 PROCEDURE — 65270000032 HC RM SEMIPRIVATE

## 2017-04-02 PROCEDURE — 87077 CULTURE AEROBIC IDENTIFY: CPT | Performed by: HOSPITALIST

## 2017-04-02 PROCEDURE — 87186 SC STD MICRODIL/AGAR DIL: CPT | Performed by: HOSPITALIST

## 2017-04-02 PROCEDURE — 81001 URINALYSIS AUTO W/SCOPE: CPT | Performed by: HOSPITALIST

## 2017-04-02 PROCEDURE — 85018 HEMOGLOBIN: CPT | Performed by: HOSPITALIST

## 2017-04-02 PROCEDURE — 83540 ASSAY OF IRON: CPT | Performed by: HOSPITALIST

## 2017-04-02 PROCEDURE — 74011250636 HC RX REV CODE- 250/636: Performed by: HOSPITALIST

## 2017-04-02 PROCEDURE — 74011250637 HC RX REV CODE- 250/637: Performed by: INTERNAL MEDICINE

## 2017-04-02 PROCEDURE — 36415 COLL VENOUS BLD VENIPUNCTURE: CPT

## 2017-04-02 PROCEDURE — 85045 AUTOMATED RETICULOCYTE COUNT: CPT | Performed by: HOSPITALIST

## 2017-04-02 PROCEDURE — 74011000258 HC RX REV CODE- 258: Performed by: INTERNAL MEDICINE

## 2017-04-02 PROCEDURE — 82728 ASSAY OF FERRITIN: CPT | Performed by: HOSPITALIST

## 2017-04-02 RX ORDER — SODIUM CHLORIDE 9 MG/ML
75 INJECTION, SOLUTION INTRAVENOUS CONTINUOUS
Status: DISCONTINUED | OUTPATIENT
Start: 2017-04-02 | End: 2017-04-03

## 2017-04-02 RX ADMIN — FERROUS SULFATE TAB 325 MG (65 MG ELEMENTAL FE) 325 MG: 325 (65 FE) TAB at 07:26

## 2017-04-02 RX ADMIN — Medication 10 ML: at 21:38

## 2017-04-02 RX ADMIN — PANTOPRAZOLE SODIUM 40 MG: 40 TABLET, DELAYED RELEASE ORAL at 06:30

## 2017-04-02 RX ADMIN — DOCUSATE SODIUM 100 MG: 100 CAPSULE, LIQUID FILLED ORAL at 10:35

## 2017-04-02 RX ADMIN — ACETAMINOPHEN 650 MG: 325 TABLET, FILM COATED ORAL at 21:59

## 2017-04-02 RX ADMIN — HEPARIN SODIUM 5000 UNITS: 5000 INJECTION, SOLUTION INTRAVENOUS; SUBCUTANEOUS at 15:04

## 2017-04-02 RX ADMIN — METOPROLOL TARTRATE 12.5 MG: 25 TABLET ORAL at 10:35

## 2017-04-02 RX ADMIN — ACETAMINOPHEN 650 MG: 325 TABLET, FILM COATED ORAL at 04:59

## 2017-04-02 RX ADMIN — FERROUS SULFATE TAB 325 MG (65 MG ELEMENTAL FE) 325 MG: 325 (65 FE) TAB at 17:33

## 2017-04-02 RX ADMIN — SODIUM CHLORIDE 75 ML/HR: 900 INJECTION, SOLUTION INTRAVENOUS at 12:40

## 2017-04-02 RX ADMIN — Medication 10 ML: at 06:34

## 2017-04-02 RX ADMIN — Medication 10 ML: at 21:37

## 2017-04-02 RX ADMIN — FLUTICASONE PROPIONATE 2 SPRAY: 50 SPRAY, METERED NASAL at 15:04

## 2017-04-02 RX ADMIN — PANTOPRAZOLE SODIUM 40 MG: 40 TABLET, DELAYED RELEASE ORAL at 17:33

## 2017-04-02 RX ADMIN — ZOLPIDEM TARTRATE 5 MG: 5 TABLET, FILM COATED ORAL at 21:37

## 2017-04-02 RX ADMIN — Medication 10 ML: at 12:40

## 2017-04-02 RX ADMIN — HEPARIN SODIUM 5000 UNITS: 5000 INJECTION, SOLUTION INTRAVENOUS; SUBCUTANEOUS at 07:26

## 2017-04-02 RX ADMIN — ROSUVASTATIN CALCIUM 20 MG: 10 TABLET, FILM COATED ORAL at 21:37

## 2017-04-02 RX ADMIN — ACETAMINOPHEN 650 MG: 325 TABLET, FILM COATED ORAL at 15:13

## 2017-04-02 RX ADMIN — HEPARIN SODIUM 5000 UNITS: 5000 INJECTION, SOLUTION INTRAVENOUS; SUBCUTANEOUS at 23:54

## 2017-04-02 RX ADMIN — METOPROLOL TARTRATE 12.5 MG: 25 TABLET ORAL at 17:33

## 2017-04-02 RX ADMIN — CEFTRIAXONE 1 G: 1 INJECTION, POWDER, FOR SOLUTION INTRAMUSCULAR; INTRAVENOUS at 15:05

## 2017-04-02 NOTE — PROGRESS NOTES
Hospitalist Progress Note  Sofiya Owens MD  Office: [phone]        Date of Service:  2017  NAME:  Addy Pacheco  :  1935  MRN:  [id number]      Admission Summary:     Patient is an [de-identified] white male who lives in assisted living with his wife. He states that he does not walk at all, and uses a wheelchair. Patient presents with complaints of right leg pain. Interval history / Subjective:    Asked why he is here,he reiterated about his leg pain ,but he said he does not walk,uses WC     Assessment & Plan:     Leg pain bilateral, acute on chronic. Denied fall  -Exam is benign  -Doppler negative for VTE    Urinary tract infection. IV ceftriaxone. -UA abnormal.Requested urine  Culture    Chronic kidney disease, stage III. - Creatinine about base line    Mild hypernatremia  -Will give him NS. Recheck BMP in AM  Chronic lymphedema of the lower extremities. Ultrasound has ruled out acute deep in thrombosis. Echocardiogram from a few months ago () is essentially normal. ProBNP is in normal range. This is not related to heart failure. Impaired mobility  -WC at baseline    Anemia of chronic disease.  -Hb dropped,no apparent reason,?dilutional,had bolus of 2L NS. Stat check and monitor. Code status: full  DVT prophylaxis:heparin    Care Plan discussed with: Patient/Family and Nurse  Disposition: Home w/Family     Hospital Problems  Date Reviewed: 11/10/2016          Codes Class Noted POA    Urinary tract infection ICD-10-CM: N39.0  ICD-9-CM: 599.0  2017 Unknown        Acute kidney injury [facility]) ICD-10-CM: N17.9  ICD-9-CM: 584.9  2017 Unknown                Review of Systems:   Pertinent items are noted in HPI. Vital Signs:    Last 24hrs VS reviewed since prior progress note.  Most recent are:  Visit Vitals    /65    Pulse 96    Temp 98.4 °F (36.9 °C)    Resp 18    Ht 5' 7\" (1.702 m)    Wt 68 kg (150 lb)    SpO2 97%    BMI 23.49 kg/m2         Intake/Output Summary (Last 24 hours) at 04/02/17 1100  Last data filed at 04/02/17 0940   Gross per 24 hour   Intake             2540 ml   Output                0 ml   Net             2540 ml        Physical Examination:             Constitutional:  No acute distress, cooperative,not in distress   ENT:  Oral mucous moist, oropharynx benign. Resp:  CTA bilaterally. No wheezing/rhonchi/rales. No accessory muscle use   CV:  Regular rhythm, normal rate, no murmurs, gallops, rubs    GI:  Soft, non distended, non tender. normoactive bowel sounds, no hepatosplenomegaly     Musculoskeletal:  Chronic looking leg edema, no erythema, discharge,scar of knee surgery. Neurologic:  Moves all extremities. Alert and oriented. Legs are weak     Psych:  Good insight, Not anxious nor agitated. Data Review:    Review and/or order of clinical lab test  Review and/or order of tests in the radiology section of CPT  Review and/or order of tests in the medicine section of CPT      Labs:     Recent Labs      04/02/17   0213  04/01/17   1340   WBC  7.8  10.5   HGB  7.7*  10.1*   HCT  24.5*  32.2*   PLT  277  333     Recent Labs      04/02/17   0213  04/01/17   1340   NA  148*  145   K  4.0  4.2   CL  117*  111*   CO2  22  23   BUN  48*  50*   CREA  1.88*  2.08*   GLU  80  97   CA  7.6*  8.3*     Recent Labs      04/02/17   0213  04/01/17   1340   SGOT  29  27   ALT  15  18   AP  108  152*   TBILI  0.4  0.9   TP  5.3*  7.2   ALB  2.0*  2.7*   GLOB  3.3  4.5*     No results for input(s): INR, PTP, APTT in the last 72 hours. No lab exists for component: INREXT   No results for input(s): FE, TIBC, PSAT, FERR in the last 72 hours. Lab Results   Component Value Date/Time    Folate 8.4 01/23/2015 07:19 PM      No results for input(s): PH, PCO2, PO2 in the last 72 hours.   Recent Labs      04/01/17   1340   TROIQ  <0.04     No results found for: CHOL, CHOLX, CHLST, CHOLV, HDL, LDL, DLDL, LDLC, DLDLP, TGL, TGLX, TRIGL, TRIGP, CHHD, CHHDX  Lab Results   Component Value Date/Time    Glucose (POC) 118 01/27/2015 12:15 PM    Glucose (POC) 117 01/27/2015 07:26 AM    Glucose (POC) 168 01/26/2015 09:03 PM    Glucose (POC) 146 01/26/2015 04:26 PM    Glucose (POC) 144 01/26/2015 11:27 AM     Lab Results   Component Value Date/Time    Color YELLOW/STRAW 04/01/2017 01:40 PM    Appearance TURBID 04/01/2017 01:40 PM    Specific gravity 1.015 04/01/2017 01:40 PM    pH (UA) 5.5 04/01/2017 01:40 PM    Protein 30 04/01/2017 01:40 PM    Glucose NEGATIVE  04/01/2017 01:40 PM    Ketone NEGATIVE  04/01/2017 01:40 PM    Bilirubin NEGATIVE  04/01/2017 01:40 PM    Urobilinogen 0.2 04/01/2017 01:40 PM    Nitrites NEGATIVE  04/01/2017 01:40 PM    Leukocyte Esterase LARGE 04/01/2017 01:40 PM    Epithelial cells MODERATE 04/01/2017 01:40 PM    Bacteria 1+ 04/01/2017 01:40 PM    WBC >100 04/01/2017 01:40 PM    RBC 0-5 04/01/2017 01:40 PM         Medications Reviewed:     Current Facility-Administered Medications   Medication Dose Route Frequency    sodium chloride (NS) flush 5-10 mL  5-10 mL IntraVENous Q8H    sodium chloride (NS) flush 5-10 mL  5-10 mL IntraVENous PRN    heparin (porcine) injection 5,000 Units  5,000 Units SubCUTAneous Q8H    cefTRIAXone (ROCEPHIN) 1 g in 0.9% sodium chloride (MBP/ADV) 50 mL  1 g IntraVENous Q24H    acetaminophen (TYLENOL) tablet 650 mg  650 mg Oral Q8H PRN    docusate sodium (COLACE) capsule 100 mg  100 mg Oral DAILY    fluticasone (FLONASE) 50 mcg/actuation nasal spray 2 Spray  2 Spray Both Nostrils DAILY    ferrous sulfate tablet 325 mg  325 mg Oral BID WITH MEALS    metoprolol tartrate (LOPRESSOR) tablet 12.5 mg  12.5 mg Oral BID    pantoprazole (PROTONIX) tablet 40 mg  40 mg Oral ACB&D    rosuvastatin (CRESTOR) tablet 20 mg  20 mg Oral QHS    zolpidem (AMBIEN) tablet 5 mg  5 mg Oral QHS     ______________________________________________________________________  EXPECTED LENGTH OF STAY: - - -  ACTUAL LENGTH OF STAY:          1                 Brooklyn Carpenter MD

## 2017-04-02 NOTE — PROGRESS NOTES
Bedside shift change report given to Chanelle (oncoming nurse) by Aidee Power (offgoing nurse). Report included the following information SBAR, Kardex, Intake/Output, MAR, Accordion and Recent Results.

## 2017-04-03 LAB
ANION GAP BLD CALC-SCNC: 8 MMOL/L (ref 5–15)
BUN SERPL-MCNC: 45 MG/DL (ref 6–20)
BUN/CREAT SERPL: 25 (ref 12–20)
CALCIUM SERPL-MCNC: 7.4 MG/DL (ref 8.5–10.1)
CHLORIDE SERPL-SCNC: 116 MMOL/L (ref 97–108)
CO2 SERPL-SCNC: 22 MMOL/L (ref 21–32)
CREAT SERPL-MCNC: 1.81 MG/DL (ref 0.7–1.3)
GLUCOSE SERPL-MCNC: 87 MG/DL (ref 65–100)
HCT VFR BLD AUTO: 25.4 % (ref 36.6–50.3)
HGB BLD-MCNC: 7.9 G/DL (ref 12.1–17)
POTASSIUM SERPL-SCNC: 4.4 MMOL/L (ref 3.5–5.1)
SODIUM SERPL-SCNC: 146 MMOL/L (ref 136–145)

## 2017-04-03 PROCEDURE — 97530 THERAPEUTIC ACTIVITIES: CPT

## 2017-04-03 PROCEDURE — 36415 COLL VENOUS BLD VENIPUNCTURE: CPT | Performed by: HOSPITALIST

## 2017-04-03 PROCEDURE — 74011000258 HC RX REV CODE- 258: Performed by: INTERNAL MEDICINE

## 2017-04-03 PROCEDURE — 85018 HEMOGLOBIN: CPT | Performed by: HOSPITALIST

## 2017-04-03 PROCEDURE — 80048 BASIC METABOLIC PNL TOTAL CA: CPT | Performed by: HOSPITALIST

## 2017-04-03 PROCEDURE — 74011250636 HC RX REV CODE- 250/636: Performed by: INTERNAL MEDICINE

## 2017-04-03 PROCEDURE — 74011250636 HC RX REV CODE- 250/636: Performed by: HOSPITALIST

## 2017-04-03 PROCEDURE — 74011250637 HC RX REV CODE- 250/637: Performed by: INTERNAL MEDICINE

## 2017-04-03 PROCEDURE — 65270000032 HC RM SEMIPRIVATE

## 2017-04-03 PROCEDURE — 97161 PT EVAL LOW COMPLEX 20 MIN: CPT

## 2017-04-03 RX ADMIN — Medication 10 ML: at 07:15

## 2017-04-03 RX ADMIN — ZOLPIDEM TARTRATE 5 MG: 5 TABLET, FILM COATED ORAL at 22:09

## 2017-04-03 RX ADMIN — PANTOPRAZOLE SODIUM 40 MG: 40 TABLET, DELAYED RELEASE ORAL at 07:13

## 2017-04-03 RX ADMIN — FLUTICASONE PROPIONATE 2 SPRAY: 50 SPRAY, METERED NASAL at 08:29

## 2017-04-03 RX ADMIN — FERROUS SULFATE TAB 325 MG (65 MG ELEMENTAL FE) 325 MG: 325 (65 FE) TAB at 16:52

## 2017-04-03 RX ADMIN — HEPARIN SODIUM 5000 UNITS: 5000 INJECTION, SOLUTION INTRAVENOUS; SUBCUTANEOUS at 23:23

## 2017-04-03 RX ADMIN — CEFTRIAXONE 1 G: 1 INJECTION, POWDER, FOR SOLUTION INTRAMUSCULAR; INTRAVENOUS at 16:50

## 2017-04-03 RX ADMIN — Medication 10 ML: at 22:10

## 2017-04-03 RX ADMIN — METOPROLOL TARTRATE 12.5 MG: 25 TABLET ORAL at 17:17

## 2017-04-03 RX ADMIN — PANTOPRAZOLE SODIUM 40 MG: 40 TABLET, DELAYED RELEASE ORAL at 16:52

## 2017-04-03 RX ADMIN — HEPARIN SODIUM 5000 UNITS: 5000 INJECTION, SOLUTION INTRAVENOUS; SUBCUTANEOUS at 07:13

## 2017-04-03 RX ADMIN — FERROUS SULFATE TAB 325 MG (65 MG ELEMENTAL FE) 325 MG: 325 (65 FE) TAB at 07:12

## 2017-04-03 RX ADMIN — METOPROLOL TARTRATE 12.5 MG: 25 TABLET ORAL at 08:24

## 2017-04-03 RX ADMIN — DOCUSATE SODIUM 100 MG: 100 CAPSULE, LIQUID FILLED ORAL at 08:24

## 2017-04-03 RX ADMIN — Medication 10 ML: at 14:52

## 2017-04-03 RX ADMIN — SODIUM CHLORIDE 75 ML/HR: 900 INJECTION, SOLUTION INTRAVENOUS at 00:27

## 2017-04-03 RX ADMIN — HEPARIN SODIUM 5000 UNITS: 5000 INJECTION, SOLUTION INTRAVENOUS; SUBCUTANEOUS at 16:51

## 2017-04-03 RX ADMIN — ROSUVASTATIN CALCIUM 20 MG: 10 TABLET, FILM COATED ORAL at 22:09

## 2017-04-03 NOTE — WOUND CARE
WOCN Note:     New consult placed by RN for penis and sacrum. Chart shows:  Admitted for leg pain UTI; history of CKD, falls, rhabdomyolysis, lymphedema, right knee replacement 2014  Admitted from assisted living. Assessment:   Patient is communicative but with inconsistent answers and requires full assists in repositioning. Leg spacticity. Bed: total care  Patient wearing briefs for incontinence and cleaned of soft smear of stool and urine. Aloe Jud applied. Patient reports knee pain and requests frequent knee repositioning. Bilateral heel skin intact and without erythema. Mixed tissues over gluteal cleft, sacrum, and buttocks. There are rings around anus, buttocks and on posterior thigh suggestive of BSC or toilet seat trauma. These rings are intact and blanching pink. Sacrum with MASD partial thickness tissue loss, fully blanching pink. Top of gluteal cleft with non-blanching purple = 2 x 1 x 0 cm  Fully intact. Aloe Vesta applied until Venelex is up from pharmacy. Patient repositioned on left side with pillow between the knees. Heels offloaded on pillow. Recommendations:    Venelex twice daily to sacrum and buttocks. Skin Care & Pressure Prevention:  Minimize layers of linen/pads under patient to optimize support surface. Turn/reposition approximately every 2 hours and offload heels. Manage incontinence. Discussed above plan with patient & RN, Linnette Love.     Transition of Care: Plan to follow weekly and as needed while admitted to hospital.    LUIS E Strickland, RN, [facility]  Certified Wound, Ostomy, Continence Nurse  office [phone]  pager [phone] or call  to page

## 2017-04-03 NOTE — PROGRESS NOTES
Hospitalist Progress Note  Petra Syed MD  Office: [phone]        Date of Service:  4/3/2017  NAME:  Steward Kayser  :  1935  MRN:  [id number]      Admission Summary:     Patient is an 81-year-old white male who lives in assisted living with his wife. He states that he does not walk at all, and uses a wheelchair. Patient presents with complaints of right leg pain. Interval history / Subjective:   He uses WC,but transfers on own using cane,he thinks he is not at base line. Assessment & Plan:     Leg pain bilateral, acute on chronic. Denied fall  -Exam is benign  -Doppler negative for VTE    Urinary tract infection. IV ceftriaxone. -UA abnormal.Requested urine  Culture    Chronic kidney disease, stage III. - Creatinine about base line    Mild hypernatremia  -Will give him NS. Recheck BMP in AM  Chronic lymphedema of the lower extremities. Ultrasound has ruled out acute deep in thrombosis. Echocardiogram from a few months ago () is essentially normal. ProBNP is in normal range. This is not related to heart failure. Impaired mobility  -WC at baseline    Anemia of chronic disease.  -Hb dropped,no apparent reason,?dilutional,had bolus of 2L NS.  -H up and stable, drop likely was dilutional.      Code status: full  DVT prophylaxis:heparin    Care Plan discussed with: Patient/Family and Nurse  Disposition: TBD. PT eval     Hospital Problems  Date Reviewed: 11/10/2016          Codes Class Noted POA    Urinary tract infection ICD-10-CM: N39.0  ICD-9-CM: 599.0  2017 Unknown        Acute kidney injury [facility]) ICD-10-CM: N17.9  ICD-9-CM: 584.9  2017 Unknown                Review of Systems:   Pertinent items are noted in HPI. Vital Signs:    Last 24hrs VS reviewed since prior progress note.  Most recent are:  Visit Vitals    /72    Pulse 94    Temp 98 °F (36.7 °C)    Resp 18    Ht 5' 7\" (1.702 m)    Wt 68 kg (150 lb)    SpO2 94%    BMI 23.49 kg/m2         Intake/Output Summary (Last 24 hours) at 04/03/17 1740  Last data filed at 04/03/17 1310   Gross per 24 hour   Intake          1446.25 ml   Output                0 ml   Net          1446.25 ml        Physical Examination:             Constitutional:  No acute distress, cooperative,not in distress   ENT:  Oral mucous moist, oropharynx benign. Resp:  CTA bilaterally. No wheezing/rhonchi/rales. No accessory muscle use   CV:  Regular rhythm, normal rate, no murmurs, gallops, rubs    GI:  Soft, non distended, non tender. normoactive bowel sounds, no hepatosplenomegaly     Musculoskeletal:  Chronic looking leg edema, no erythema, discharge,scar of knee surgery. Neurologic:  Moves all extremities. Alert and oriented. Legs are weak     Psych:  Good insight, Not anxious nor agitated. Data Review:    Review and/or order of clinical lab test  Review and/or order of tests in the radiology section of CPT  Review and/or order of tests in the medicine section of CPT      Labs:     Recent Labs      04/03/17   1239  04/02/17   2335   04/02/17   0213  04/01/17   1340   WBC   --    --    --   7.8  10.5   HGB  7.9*  7.7*   < >  7.7*  10.1*   HCT  25.4*  24.4*   < >  24.5*  32.2*   PLT   --    --    --   277  333    < > = values in this interval not displayed. Recent Labs      04/03/17   0343  04/02/17   0213  04/01/17   1340   NA  146*  148*  145   K  4.4  4.0  4.2   CL  116*  117*  111*   CO2  22  22  23   BUN  45*  48*  50*   CREA  1.81*  1.88*  2.08*   GLU  87  80  97   CA  7.4*  7.6*  8.3*     Recent Labs      04/02/17   0213  04/01/17   1340   SGOT  29  27   ALT  15  18   AP  108  152*   TBILI  0.4  0.9   TP  5.3*  7.2   ALB  2.0*  2.7*   GLOB  3.3  4.5*     No results for input(s): INR, PTP, APTT in the last 72 hours.     No lab exists for component: Jason Cee   Recent Labs      04/02/17   0213   FE  17*   TIBC  131*   PSAT  13*   FERR  906*      Lab Results   Component Value Date/Time    Folate 8.4 01/23/2015 07:19 PM      No results for input(s): PH, PCO2, PO2 in the last 72 hours.   Recent Labs      04/01/17   1340   TROIQ  <0.04     No results found for: CHOL, CHOLX, CHLST, CHOLV, HDL, LDL, DLDL, LDLC, DLDLP, TGL, TGLX, TRIGL, TRIGP, CHHD, CHHDX  Lab Results   Component Value Date/Time    Glucose (POC) 118 01/27/2015 12:15 PM    Glucose (POC) 117 01/27/2015 07:26 AM    Glucose (POC) 168 01/26/2015 09:03 PM    Glucose (POC) 146 01/26/2015 04:26 PM    Glucose (POC) 144 01/26/2015 11:27 AM     Lab Results   Component Value Date/Time    Color YELLOW/STRAW 04/02/2017 03:30 PM    Appearance TURBID 04/02/2017 03:30 PM    Specific gravity 1.017 04/02/2017 03:30 PM    pH (UA) 5.5 04/02/2017 03:30 PM    Protein TRACE 04/02/2017 03:30 PM    Glucose NEGATIVE  04/02/2017 03:30 PM    Ketone NEGATIVE  04/02/2017 03:30 PM    Bilirubin NEGATIVE  04/02/2017 03:30 PM    Urobilinogen 0.2 04/02/2017 03:30 PM    Nitrites NEGATIVE  04/02/2017 03:30 PM    Leukocyte Esterase LARGE 04/02/2017 03:30 PM    Epithelial cells FEW 04/02/2017 03:30 PM    Bacteria NEGATIVE  04/02/2017 03:30 PM    WBC >100 04/02/2017 03:30 PM    RBC 0-5 04/02/2017 03:30 PM         Medications Reviewed:     Current Facility-Administered Medications   Medication Dose Route Frequency    sodium chloride (NS) flush 5-10 mL  5-10 mL IntraVENous Q8H    sodium chloride (NS) flush 5-10 mL  5-10 mL IntraVENous PRN    heparin (porcine) injection 5,000 Units  5,000 Units SubCUTAneous Q8H    cefTRIAXone (ROCEPHIN) 1 g in 0.9% sodium chloride (MBP/ADV) 50 mL  1 g IntraVENous Q24H    acetaminophen (TYLENOL) tablet 650 mg  650 mg Oral Q8H PRN    docusate sodium (COLACE) capsule 100 mg  100 mg Oral DAILY    fluticasone (FLONASE) 50 mcg/actuation nasal spray 2 Spray  2 Spray Both Nostrils DAILY    ferrous sulfate tablet 325 mg  325 mg Oral BID WITH MEALS    metoprolol tartrate (LOPRESSOR) tablet 12.5 mg  12.5 mg Oral BID    pantoprazole (PROTONIX) tablet 40 mg  40 mg Oral ACB&D    rosuvastatin (CRESTOR) tablet 20 mg  20 mg Oral QHS    zolpidem (AMBIEN) tablet 5 mg  5 mg Oral QHS     ______________________________________________________________________  EXPECTED LENGTH OF STAY: 3d 2h  ACTUAL LENGTH OF STAY:          2                 Ferny Seth MD

## 2017-04-03 NOTE — PROGRESS NOTES
Bedside shift change report given to [address] [place] [address] (oncoming nurse) by Allison Rodrigez RN (offgoing nurse). Report included the following information SBAR, Kardex, ED Summary, Intake/Output, MAR, Accordion and Recent Results.

## 2017-04-03 NOTE — PROGRESS NOTES
Reviewed medical chart; met with the patient at the bedside. Patient has a history of arrhythmia, arthritis, constipation, hearing loss, heart disease, hypercholesteremia, hypertension, joint pain, and memory disorder. Patient and his wife recently moved to an apartment at  [facility]. The patient uses a power chair to get around, but explained that he uses a walker to transfer from his power chair to his reclining chair. He sleeps in his reclining chair. Patient has a PCP - Dr. Davide Stanton and gets his prescriptions at [facility]. Patient is currently on oxygen in the hospital, but does not use oxygen at home. Patient is unable to bath in the bathtub or shower anymore, so he bathes himself using the sink. He and his wife go to the dining room at [facility] for some meals, and have some meals brought to their apartment. Patient's daughter, Willam ROSALES#[phone], will help to coordinate his discharge plans. Care Management will continue to follow his disposition. CASSIUS Maxwell     Care Management Interventions  PCP Verified by CM:  Yes  Palliative Care Consult (Criteria: CHF and RRAT>21): No  Mode of Transport at Discharge:  (Patient will travel via wheelchair van vs. ambulance.  )  MyChart Signup: No  Discharge Durable Medical Equipment: No (Power wheelchair.  )  Physical Therapy Consult: No  Occupational Therapy Consult: No  Speech Therapy Consult: No  Current Support Network: Lives with Spouse, Assisted Living  Confirm Follow Up Transport:  (Patient will travel via wheelchair van vs. ambulance. )  Plan discussed with Pt/Family/Caregiver: Yes  Freedom of Choice Offered: Yes  Discharge Location  Discharge Placement: Home

## 2017-04-03 NOTE — PROGRESS NOTES
Problem: Mobility Impaired (Adult and Pediatric)  Goal: *Acute Goals and Plan of Care (Insert Text)  Physical Therapy Goals  Initiated 4/3/2017  1. Patient will move from supine to sit and sit to supine , scoot up and down and roll side to side in bed with minimal assistance/contact guard assist within 7 day(s). 2. Patient will transfer from bed to chair and chair to bed with minimal assistance/contact guard assist using the least restrictive device within 7 day(s). 3. Patient will perform sit to stand with minimal assistance/contact guard assist within 7 day(s). PHYSICAL THERAPY EVALUATION  Patient: Sofia Matt (27 y.o. male)  Date: 4/3/2017  Primary Diagnosis: Acute kidney injury [facility])  Urinary tract infection        Precautions:   Fall      ASSESSMENT :  Based on the objective data described below, the patient presents with decreased independence with mobility compared to his baseline level prior to admission limited predominately by pain, decreased LE ROM, and generalized weakness. At baseline, patient performs stand pivot transfers with rolling walker to transfer from bed to his power wheelchair. Patient reports he and his wife are assisted by a staff member to get dressed, bathed, and up in mornings. Patient required overall max A and additional time this date to perform bed mobility to achieve EOB sitting. Once sitting, patient continued to require manual assistance posteriorly to maintain upright posture. Patient c/o B LE pain in sitting and refusing to attempt standing at this time. Returned to supine with Max A and positioned with pillow under legs and all needs met. Patient not safe to return to [facility] at this time as he needs higher level of assistance for transfers. Discharge dispo TBD. Will continue to follow. Patient agreeable to try again and will likely benefit from being seen earlier in the day. Patient will benefit from skilled intervention to address the above impairments.   Patients rehabilitation potential is considered to be Fair  Factors which may influence rehabilitation potential include:   [ ]         None noted  [ ]         Mental ability/status  [X]         Medical condition  [X]         Home/family situation and support systems  [X]         Safety awareness  [X]         Pain tolerance/management  [ ]         Other:        PLAN :  Recommendations and Planned Interventions:  [ ]           Bed Mobility Training             [ ]    Neuromuscular Re-Education  [ ]           Transfer Training                   [ ]    Orthotic/Prosthetic Training  [ ]           Gait Training                         [ ]    Modalities  [ ]           Therapeutic Exercises           [ ]    Edema Management/Control  [ ]           Therapeutic Activities            [ ]    Patient and Family Training/Education  [ ]           Other (comment):     Frequency/Duration: Patient will be followed by physical therapy  5 times a week to address goals. Discharge Recommendations: To Be Determined  Further Equipment Recommendations for Discharge: tbd       SUBJECTIVE:   Patient stated Whatever.       OBJECTIVE DATA SUMMARY:   HISTORY:    Past Medical History:   Diagnosis Date    Arrhythmia       RAPID HEARTBEAT    Arthritis      Constipation      Hearing loss      Heart disease      Hypercholesteremia      Hypertension      Joint pain      Memory disorder       Past Surgical History:   Procedure Laterality Date    HX BACK SURGERY   1974    HX CATARACT REMOVAL         LEFT    HX COLONOSCOPY        HX HERNIA REPAIR   1975     double    HX TONSILLECTOMY        UPPER GI ENDOSCOPY,BIOPSY   3/22/2013           Prior Level of Function/Home Situation: uses power wc for primary mobility. Able to stand pivot transfer from bed to wc with RW.    Personal factors and/or comorbidities impacting plan of care:      Home Situation  Home Environment: [address] Name: [facility]  One/[place] Residence: One story  Living Alone: No  Support Systems: Assisted living, Family member(s)  Patient Expects to be Discharged to[de-identified] Assisted living  Current DME Used/Available at Home: Wheelchair, power, Walker, rolling  Tub or Shower Type: Shower     EXAMINATION/PRESENTATION/DECISION MAKING:   Critical Behavior:  Neurologic State: Alert  Orientation Level: Disoriented X4  Cognition: Appropriate decision making, Appropriate for age attention/concentration, Appropriate safety awareness, Follows commands     Hearing: Auditory  Auditory Impairment: None     Range Of Motion:  AROM: Generally decreased, functional                       Strength:    Strength: Generally decreased, functional                    Tone & Sensation:   Tone: Abnormal (increased in B LEs)              Sensation:  (hypersensitive to touch in B LEs)               Coordination:     Vision:      Functional Mobility:  Bed Mobility:     Supine to Sit: Maximum assistance  Sit to Supine: Maximum assistance  Scooting: Maximum assistance  Transfers:                             Balance:   Sitting: Impaired  Sitting - Static: Fair (occasional); Poor (constant support)  Sitting - Dynamic: Poor (constant support)  Ambulation/Gait Training:                                                        Functional Measure:  Timed up and go:      Timed Get Up And Go Test:  (unable)      Timed Up and Go and G-code impairment scale:  Percentage of Impairment CH     0%    CI     1-19% CJ     20-39% CK     40-59% CL     60-79% CM     80-99% CN      100%   Timed   Score 0-56 10 11-12 13-14 15-16 17-18 19 20          < than 10 seconds=Normal  Greater then 13.5 seconds (in elderly)=Increased fall risk   Chrissy Chopra [place] M. Predicting the probability for falls in community dwelling older adults using the Timed Up and Go Test. Phys Ther. 2000;80:896-903. G codes:   In compliance with CMSs Claims Based Outcome Reporting, the following G-code set was chosen for this patient based on their primary functional limitation being treated: The outcome measure chosen to determine the severity of the functional limitation was the TUG with a score of 0/unable which was correlated with the impairment scale. · Mobility - Walking and Moving Around:               - CURRENT STATUS:    CN - 100% impaired, limited or restricted               - GOAL STATUS:           CM - 80%-99% impaired, limited or restricted               - D/C STATUS:                       ---------------To be determined---------------      Pain:  Pain Scale 1: Numeric (0 - 10)  Pain Intensity 1: 7  Pain Location 1: Leg  Pain Orientation 1: Left;Right  Pain Description 1: Aching  Pain Intervention(s) 1: Rest  Activity Tolerance:   Limited by pain/weakness  Please refer to the flowsheet for vital signs taken during this treatment. After treatment:   [ ]         Patient left in no apparent distress sitting up in chair  [X]         Patient left in no apparent distress in bed  [X]         Call bell left within reach  [X]         Nursing notified  [ ]         Caregiver present  [ ]         Bed alarm activated      COMMUNICATION/EDUCATION:   The patients plan of care was discussed with: Occupational Therapist and Registered Nurse.  [X]         Fall prevention education was provided and the patient/caregiver indicated understanding. [X]         Patient/family have participated as able in goal setting and plan of care. [X]         Patient/family agree to work toward stated goals and plan of care. [ ]         Patient understands intent and goals of therapy, but is neutral about his/her participation. [ ]         Patient is unable to participate in goal setting and plan of care.      Thank you for this referral.  Amy Hale, PT, DPT   Time Calculation: 24 mins

## 2017-04-04 ENCOUNTER — APPOINTMENT (OUTPATIENT)
Dept: CT IMAGING | Age: 82
DRG: 481 | End: 2017-04-04
Attending: HOSPITALIST
Payer: MEDICARE

## 2017-04-04 LAB
HCT VFR BLD AUTO: 24.9 % (ref 36.6–50.3)
HGB BLD-MCNC: 7.9 G/DL (ref 12.1–17)

## 2017-04-04 PROCEDURE — 65270000032 HC RM SEMIPRIVATE

## 2017-04-04 PROCEDURE — 74011250637 HC RX REV CODE- 250/637: Performed by: HOSPITALIST

## 2017-04-04 PROCEDURE — 97530 THERAPEUTIC ACTIVITIES: CPT | Performed by: PHYSICAL THERAPIST

## 2017-04-04 PROCEDURE — 74011000258 HC RX REV CODE- 258: Performed by: INTERNAL MEDICINE

## 2017-04-04 PROCEDURE — 85018 HEMOGLOBIN: CPT | Performed by: HOSPITALIST

## 2017-04-04 PROCEDURE — 73700 CT LOWER EXTREMITY W/O DYE: CPT

## 2017-04-04 PROCEDURE — 74011250637 HC RX REV CODE- 250/637: Performed by: INTERNAL MEDICINE

## 2017-04-04 PROCEDURE — 74011250636 HC RX REV CODE- 250/636: Performed by: INTERNAL MEDICINE

## 2017-04-04 PROCEDURE — 36415 COLL VENOUS BLD VENIPUNCTURE: CPT | Performed by: HOSPITALIST

## 2017-04-04 RX ADMIN — CEFTRIAXONE 1 G: 1 INJECTION, POWDER, FOR SOLUTION INTRAMUSCULAR; INTRAVENOUS at 16:35

## 2017-04-04 RX ADMIN — HEPARIN SODIUM 5000 UNITS: 5000 INJECTION, SOLUTION INTRAVENOUS; SUBCUTANEOUS at 16:35

## 2017-04-04 RX ADMIN — FLUTICASONE PROPIONATE 2 SPRAY: 50 SPRAY, METERED NASAL at 08:49

## 2017-04-04 RX ADMIN — ROSUVASTATIN CALCIUM 20 MG: 10 TABLET, FILM COATED ORAL at 22:46

## 2017-04-04 RX ADMIN — CASTOR OIL AND BALSAM, PERU: 788; 87 OINTMENT TOPICAL at 16:23

## 2017-04-04 RX ADMIN — PANTOPRAZOLE SODIUM 40 MG: 40 TABLET, DELAYED RELEASE ORAL at 07:54

## 2017-04-04 RX ADMIN — METOPROLOL TARTRATE 12.5 MG: 25 TABLET ORAL at 18:15

## 2017-04-04 RX ADMIN — METOPROLOL TARTRATE 12.5 MG: 25 TABLET ORAL at 08:50

## 2017-04-04 RX ADMIN — PANTOPRAZOLE SODIUM 40 MG: 40 TABLET, DELAYED RELEASE ORAL at 16:35

## 2017-04-04 RX ADMIN — Medication 10 ML: at 22:46

## 2017-04-04 RX ADMIN — FERROUS SULFATE TAB 325 MG (65 MG ELEMENTAL FE) 325 MG: 325 (65 FE) TAB at 07:54

## 2017-04-04 RX ADMIN — HEPARIN SODIUM 5000 UNITS: 5000 INJECTION, SOLUTION INTRAVENOUS; SUBCUTANEOUS at 07:54

## 2017-04-04 RX ADMIN — CASTOR OIL AND BALSAM, PERU: 788; 87 OINTMENT TOPICAL at 22:45

## 2017-04-04 RX ADMIN — Medication 10 ML: at 06:00

## 2017-04-04 RX ADMIN — ACETAMINOPHEN 650 MG: 325 TABLET, FILM COATED ORAL at 08:58

## 2017-04-04 RX ADMIN — DOCUSATE SODIUM 100 MG: 100 CAPSULE, LIQUID FILLED ORAL at 08:49

## 2017-04-04 RX ADMIN — ZOLPIDEM TARTRATE 5 MG: 5 TABLET, FILM COATED ORAL at 22:46

## 2017-04-04 RX ADMIN — Medication 10 ML: at 16:36

## 2017-04-04 RX ADMIN — FERROUS SULFATE TAB 325 MG (65 MG ELEMENTAL FE) 325 MG: 325 (65 FE) TAB at 16:35

## 2017-04-04 NOTE — PROGRESS NOTES
Spoke with Dr Mari Rick about pt's c/o severe pain and swelling of left leg.  New orders written by MD.

## 2017-04-04 NOTE — PROGRESS NOTES
Bedside shift change report given to Masha Regan RN (oncoming nurse) by Adel Leyden, RN (offgoing nurse). Report included the following information SBAR, Kardex, Intake/Output, MAR and Recent Results.

## 2017-04-04 NOTE — PROGRESS NOTES
Hospitalist Progress Note  Franci Parada MD  Office: [phone]        Date of Service:  2017  NAME:  Phani Mcfadden  :  1935  MRN:  [id number]      Admission Summary:     Patient is an 16-year-old white male who lives in assisted living with his wife. He states that he does not walk at all, and uses a wheelchair. Patient presents with complaints of right leg pain. Interval history / Subjective:   He uses WC,but transfers on own using cane,he thinks he is not at base line. Assessment & Plan:     Leg pain bilateral, acute on chronic. Denied fall  -Exam is benign  -Doppler negative for VTE  -Pelvic CT with left femur fracture acute vs subacute  -Orthopedics consulted     GNR Urinary tract infection. IV ceftriaxone. Chronic kidney disease, stage III. - Creatinine about base line    Mild hypernatremia  -Will give him NS. Recheck BMP in AM  Chronic lymphedema of the lower extremities. Ultrasound has ruled out acute deep in thrombosis. Echocardiogram from a few months ago () is essentially normal. ProBNP is in normal range. This is not related to heart failure. Impaired mobility  -WC at baseline  -decline from base line may be due to left femur fracture,severe OA  -May need SNF    Anemia of chronic disease.  -Hb dropped,no apparent reason,?dilutional,had bolus of 2L NS.  -H up and stable, drop likely was dilutional.      Code status: full  DVT prophylaxis:heparin    Care Plan discussed with: Patient/Family and Nurse  Disposition: TBD. PT eval     Hospital Problems  Date Reviewed: 11/10/2016          Codes Class Noted POA    Urinary tract infection ICD-10-CM: N39.0  ICD-9-CM: 599.0  2017 Unknown        Acute kidney injury [facility]) ICD-10-CM: N17.9  ICD-9-CM: 584.9  2017 Unknown                Review of Systems:   Pertinent items are noted in HPI.        Vital Signs:    Last 24hrs VS reviewed since prior progress note. Most recent are:  Visit Vitals    /71 (BP 1 Location: Right arm, BP Patient Position: At rest)    Pulse 91    Temp 98.2 °F (36.8 °C)    Resp 22    Ht 5' 7\" (1.702 m)    Wt 68 kg (150 lb)    SpO2 95%    BMI 23.49 kg/m2         Intake/Output Summary (Last 24 hours) at 04/04/17 1630  Last data filed at 04/04/17 1014   Gross per 24 hour   Intake              240 ml   Output                0 ml   Net              240 ml        Physical Examination:             Constitutional:  No acute distress, cooperative,not in distress   ENT:  Oral mucous moist, oropharynx benign. Resp:  CTA bilaterally. No wheezing/rhonchi/rales. No accessory muscle use   CV:  Regular rhythm, normal rate, no murmurs, gallops, rubs    GI:  Soft, non distended, non tender. normoactive bowel sounds, no hepatosplenomegaly     Musculoskeletal:  Chronic looking leg edema, no erythema, discharge,scar of knee surgery. Neurologic:  Moves all extremities. Alert and oriented. Legs are weak     Psych:  Good insight, Not anxious nor agitated. Data Review:    Review and/or order of clinical lab test  Review and/or order of tests in the radiology section of CPT  Review and/or order of tests in the medicine section of CPT      Labs:     Recent Labs      04/04/17   0059  04/03/17   1239   04/02/17 0213   WBC   --    --    --   7.8   HGB  7.9*  7.9*   < >  7.7*   HCT  24.9*  25.4*   < >  24.5*   PLT   --    --    --   277    < > = values in this interval not displayed. Recent Labs      04/03/17   0343  04/02/17 0213   NA  146*  148*   K  4.4  4.0   CL  116*  117*   CO2  22  22   BUN  45*  48*   CREA  1.81*  1.88*   GLU  87  80   CA  7.4*  7.6*     Recent Labs      04/02/17 0213   SGOT  29   ALT  15   AP  108   TBILI  0.4   TP  5.3*   ALB  2.0*   GLOB  3.3     No results for input(s): INR, PTP, APTT in the last 72 hours.     No lab exists for component: Elyse Divine   Recent Labs      04/02/17   0213   FE  17*   TIBC  131* PSAT  13*   FERR  906*      Lab Results   Component Value Date/Time    Folate 8.4 01/23/2015 07:19 PM      No results for input(s): PH, PCO2, PO2 in the last 72 hours. No results for input(s): CPK, CKNDX, TROIQ in the last 72 hours.     No lab exists for component: CPKMB  No results found for: CHOL, CHOLX, CHLST, CHOLV, HDL, LDL, DLDL, LDLC, DLDLP, TGL, TGLX, TRIGL, TRIGP, CHHD, CHHDX  Lab Results   Component Value Date/Time    Glucose (POC) 118 01/27/2015 12:15 PM    Glucose (POC) 117 01/27/2015 07:26 AM    Glucose (POC) 168 01/26/2015 09:03 PM    Glucose (POC) 146 01/26/2015 04:26 PM    Glucose (POC) 144 01/26/2015 11:27 AM     Lab Results   Component Value Date/Time    Color YELLOW/STRAW 04/02/2017 03:30 PM    Appearance TURBID 04/02/2017 03:30 PM    Specific gravity 1.017 04/02/2017 03:30 PM    pH (UA) 5.5 04/02/2017 03:30 PM    Protein TRACE 04/02/2017 03:30 PM    Glucose NEGATIVE  04/02/2017 03:30 PM    Ketone NEGATIVE  04/02/2017 03:30 PM    Bilirubin NEGATIVE  04/02/2017 03:30 PM    Urobilinogen 0.2 04/02/2017 03:30 PM    Nitrites NEGATIVE  04/02/2017 03:30 PM    Leukocyte Esterase LARGE 04/02/2017 03:30 PM    Epithelial cells FEW 04/02/2017 03:30 PM    Bacteria NEGATIVE  04/02/2017 03:30 PM    WBC >100 04/02/2017 03:30 PM    RBC 0-5 04/02/2017 03:30 PM         Medications Reviewed:     Current Facility-Administered Medications   Medication Dose Route Frequency    balsam peru-castor oil (VENELEX)  mg/gram ointment   Topical BID    sodium chloride (NS) flush 5-10 mL  5-10 mL IntraVENous Q8H    sodium chloride (NS) flush 5-10 mL  5-10 mL IntraVENous PRN    heparin (porcine) injection 5,000 Units  5,000 Units SubCUTAneous Q8H    cefTRIAXone (ROCEPHIN) 1 g in 0.9% sodium chloride (MBP/ADV) 50 mL  1 g IntraVENous Q24H    acetaminophen (TYLENOL) tablet 650 mg  650 mg Oral Q8H PRN    docusate sodium (COLACE) capsule 100 mg  100 mg Oral DAILY    fluticasone (FLONASE) 50 mcg/actuation nasal spray 2 Spray  2 Spray Both Nostrils DAILY    ferrous sulfate tablet 325 mg  325 mg Oral BID WITH MEALS    metoprolol tartrate (LOPRESSOR) tablet 12.5 mg  12.5 mg Oral BID    pantoprazole (PROTONIX) tablet 40 mg  40 mg Oral ACB&D    rosuvastatin (CRESTOR) tablet 20 mg  20 mg Oral QHS    zolpidem (AMBIEN) tablet 5 mg  5 mg Oral QHS     ______________________________________________________________________  EXPECTED LENGTH OF STAY: 3d 2h  ACTUAL LENGTH OF STAY:          3                 Juve Vasquez MD

## 2017-04-04 NOTE — PROGRESS NOTES
Spiritual Care Partner Volunteer visited patient in [address] [place] [address] on 4/4/17. Documented by:  John Herrera M.Div.    Paging Service [phone])

## 2017-04-04 NOTE — PROGRESS NOTES
Problem: Mobility Impaired (Adult and Pediatric)  Goal: *Acute Goals and Plan of Care (Insert Text)  Physical Therapy Goals  Initiated 4/3/2017  1. Patient will move from supine to sit and sit to supine , scoot up and down and roll side to side in bed with minimal assistance/contact guard assist within 7 day(s). 2. Patient will transfer from bed to chair and chair to bed with minimal assistance/contact guard assist using the least restrictive device within 7 day(s). 3. Patient will perform sit to stand with minimal assistance/contact guard assist within 7 day(s). PHYSICAL THERAPY TREATMENT  Patient: Tomas Pierre (89 y.o. male)  Date: 4/4/2017  Diagnosis: Acute kidney injury [facility])  Urinary tract infection <principal problem not specified>       Precautions: Fall      ASSESSMENT:  Patient is seen for mobility and sitting EOB. It is noted he has an extremely painful LLE-either in his hip or knee. Both hurt when moved. He doesn't tolerate any touching of the LLE and is not able to flex or extend the knee or reposition the LLE. Tends to keep it flexed and crosses it under the RLE. He frequently reaches down to rub it. This makes all mobility very difficult and very painful for him. Once up in sitting he could maintain sitting balance but is unable to extend the LLE. Attempted to come up to standing on the RLE but unable to lift himself off the bed and unable to scoot on the EOB using his UE's. He was returned to supine and repositioned with nursing assist and the pads were changed. Even rolling was painful and difficult and he becomes very resistant to rolling due to the pain. The bed was then placed in the chair position and he tolerated this because his knee was flexed but he needed me to reposition it in a position of comfort supported on the pillow. MD made aware of limitations due to LLE pain. He has a hx of RTKR.   Progression toward goals:  [ ]    Improving appropriately and progressing toward goals  [ ]    Improving slowly and progressing toward goals  [X]    Not making progress toward goals and plan of care will be adjusted       PLAN:  Patient continues to benefit from skilled intervention to address the above impairments. Continue treatment per established plan of care. Discharge Recommendations:  Rehab  Further Equipment Recommendations for Discharge:  none       SUBJECTIVE:   Patient stated It all started when I had the right one operated on. Then my left knee started hurting.       OBJECTIVE DATA SUMMARY:   Critical Behavior:  Neurologic State: Alert  Orientation Level: Oriented X4  Cognition: Appropriate for age attention/concentration, Follows commands     Functional Mobility Training:  Bed Mobility:     Supine to Sit: Maximum assistance  Sit to Supine: Maximum assistance  Scooting: Maximum assistance        Transfers:               Unable at this time. Balance:  Sitting: Intact  Sitting - Static: Good (unsupported)  Sitting - Dynamic: Fair (occasional)  Ambulation/Gait Training:                                                                 Unable to stand or ambulate at this time. Therapeutic Exercises:   Able to do ankle pumps and UE ex and RLE LAQ. Unable to actively move the LLE except to do an ankle pump. Pain:  Pain Scale 1: Numeric (0 - 10)  Pain Intensity 1: 4  Pain Location 1: Leg  Pain Orientation 1: Upper;Left  Pain Description 1: Aching  Pain Intervention(s) 1: Medication (see MAR)  Activity Tolerance:   Poor due to left knee pain. Please refer to the flowsheet for vital signs taken during this treatment.   After treatment:   [ ]    Patient left in no apparent distress sitting up in chair  [X]    Patient left in no apparent distress in bed  [X]    Call bell left within reach  [X]    Nursing notified  [ ]    Caregiver present  [ ]    Bed alarm activated      COMMUNICATION/COLLABORATION:   The patients plan of care was discussed with: Registered Nurse and      Flavio Lundberg, PT   Time Calculation: 25 mins

## 2017-04-04 NOTE — PROGRESS NOTES
Occupational Therapy Note Dante 53    Orders acknowledged, chart reviewed. Attempted to work with patient, currently off the floor. Will follow up tomorrow for skilled OT evaluation. Per PT report, patient will likely require rehab d/t difficulty with mobility. Thank you.     Nathaniel Murillo, OTR/L

## 2017-04-04 NOTE — PROGRESS NOTES
Linda Morris from [employer] called, results of CT scan given. Pt is to be NPO after midnight tonight.

## 2017-04-04 NOTE — PROGRESS NOTES
Recorded Visit 4/4/17. Spiritual Care Partner Volunteer visited patient in Surgical Unit on 4/3/17.   Documented by:  Hosea Srinivasan [id number] ([id number])

## 2017-04-04 NOTE — PROGRESS NOTES
Reviewed medical chart; note that the patient has a wound over his gluteal cleft, sacrum, and buttocks. The patient is currently having difficulty with transfers and requires full assistance in repositioning. Spoke with the patient's daughter, Martha Song - T#[phone] to explain that the patient will need rehabilitation at a SNF before returning to [facility]. Martha Song is agreeable to send a referral to [facility]; referral sent via [address] - awaiting a response. Martha Song explained that the patient has long term care insurance that is paying for his care at [facility]. Care Management will continue to follow his disposition.    CASSIUS Castillo

## 2017-04-05 ENCOUNTER — APPOINTMENT (OUTPATIENT)
Dept: GENERAL RADIOLOGY | Age: 82
DRG: 481 | End: 2017-04-05
Attending: ORTHOPAEDIC SURGERY
Payer: MEDICARE

## 2017-04-05 ENCOUNTER — ANESTHESIA (OUTPATIENT)
Dept: SURGERY | Age: 82
DRG: 481 | End: 2017-04-05
Payer: MEDICARE

## 2017-04-05 ENCOUNTER — ANESTHESIA EVENT (OUTPATIENT)
Dept: SURGERY | Age: 82
DRG: 481 | End: 2017-04-05
Payer: MEDICARE

## 2017-04-05 LAB
ABO + RH BLD: NORMAL
ANION GAP BLD CALC-SCNC: 10 MMOL/L (ref 5–15)
BACTERIA SPEC CULT: ABNORMAL
BLOOD GROUP ANTIBODIES SERPL: NORMAL
BUN SERPL-MCNC: 42 MG/DL (ref 6–20)
BUN/CREAT SERPL: 26 (ref 12–20)
CALCIUM SERPL-MCNC: 7.5 MG/DL (ref 8.5–10.1)
CC UR VC: ABNORMAL
CHLORIDE SERPL-SCNC: 113 MMOL/L (ref 97–108)
CO2 SERPL-SCNC: 22 MMOL/L (ref 21–32)
CREAT SERPL-MCNC: 1.6 MG/DL (ref 0.7–1.3)
GLUCOSE SERPL-MCNC: 92 MG/DL (ref 65–100)
HCT VFR BLD AUTO: 25.9 % (ref 36.6–50.3)
HGB BLD-MCNC: 8.2 G/DL (ref 12.1–17)
INR PPP: 1.1 (ref 0.9–1.1)
POTASSIUM SERPL-SCNC: 4.2 MMOL/L (ref 3.5–5.1)
PROTHROMBIN TIME: 11.4 SEC (ref 9–11.1)
SERVICE CMNT-IMP: ABNORMAL
SODIUM SERPL-SCNC: 145 MMOL/L (ref 136–145)
SPECIMEN EXP DATE BLD: NORMAL

## 2017-04-05 PROCEDURE — 65270000029 HC RM PRIVATE

## 2017-04-05 PROCEDURE — 77030021107 HC SHFT RMR MOD DISP STRY -D: Performed by: ORTHOPAEDIC SURGERY

## 2017-04-05 PROCEDURE — 77030008684 HC TU ET CUF COVD -B: Performed by: NURSE ANESTHETIST, CERTIFIED REGISTERED

## 2017-04-05 PROCEDURE — 76000 FLUOROSCOPY <1 HR PHYS/QHP: CPT

## 2017-04-05 PROCEDURE — 77030019908 HC STETH ESOPH SIMS -A: Performed by: NURSE ANESTHETIST, CERTIFIED REGISTERED

## 2017-04-05 PROCEDURE — 74011000258 HC RX REV CODE- 258: Performed by: HOSPITALIST

## 2017-04-05 PROCEDURE — 77030018846 HC SOL IRR STRL H20 ICUM -A: Performed by: ORTHOPAEDIC SURGERY

## 2017-04-05 PROCEDURE — 76060000034 HC ANESTHESIA 1.5 TO 2 HR: Performed by: ORTHOPAEDIC SURGERY

## 2017-04-05 PROCEDURE — 74011250636 HC RX REV CODE- 250/636

## 2017-04-05 PROCEDURE — 76010000153 HC OR TIME 1.5 TO 2 HR: Performed by: ORTHOPAEDIC SURGERY

## 2017-04-05 PROCEDURE — 85018 HEMOGLOBIN: CPT | Performed by: HOSPITALIST

## 2017-04-05 PROCEDURE — 80048 BASIC METABOLIC PNL TOTAL CA: CPT | Performed by: HOSPITALIST

## 2017-04-05 PROCEDURE — 86900 BLOOD TYPING SEROLOGIC ABO: CPT | Performed by: ANESTHESIOLOGY

## 2017-04-05 PROCEDURE — 74011250636 HC RX REV CODE- 250/636: Performed by: HOSPITALIST

## 2017-04-05 PROCEDURE — 73501 X-RAY EXAM HIP UNI 1 VIEW: CPT

## 2017-04-05 PROCEDURE — 74011250636 HC RX REV CODE- 250/636: Performed by: ANESTHESIOLOGY

## 2017-04-05 PROCEDURE — 77030016452 HC BIT DRL AO4 STRY -C: Performed by: ORTHOPAEDIC SURGERY

## 2017-04-05 PROCEDURE — C1713 ANCHOR/SCREW BN/BN,TIS/BN: HCPCS | Performed by: ORTHOPAEDIC SURGERY

## 2017-04-05 PROCEDURE — C1769 GUIDE WIRE: HCPCS | Performed by: ORTHOPAEDIC SURGERY

## 2017-04-05 PROCEDURE — 74011000258 HC RX REV CODE- 258: Performed by: ANESTHESIOLOGY

## 2017-04-05 PROCEDURE — 74011250636 HC RX REV CODE- 250/636: Performed by: ORTHOPAEDIC SURGERY

## 2017-04-05 PROCEDURE — 77030018836 HC SOL IRR NACL ICUM -A: Performed by: ORTHOPAEDIC SURGERY

## 2017-04-05 PROCEDURE — 74011250637 HC RX REV CODE- 250/637: Performed by: ORTHOPAEDIC SURGERY

## 2017-04-05 PROCEDURE — 74011000258 HC RX REV CODE- 258: Performed by: INTERNAL MEDICINE

## 2017-04-05 PROCEDURE — 36415 COLL VENOUS BLD VENIPUNCTURE: CPT | Performed by: HOSPITALIST

## 2017-04-05 PROCEDURE — 74011250636 HC RX REV CODE- 250/636: Performed by: INTERNAL MEDICINE

## 2017-04-05 PROCEDURE — 0QH736Z INSERTION OF INTRAMEDULLARY INTERNAL FIXATION DEVICE INTO LEFT UPPER FEMUR, PERCUTANEOUS APPROACH: ICD-10-PCS | Performed by: ORTHOPAEDIC SURGERY

## 2017-04-05 PROCEDURE — 77030027138 HC INCENT SPIROMETER -A

## 2017-04-05 PROCEDURE — 73551 X-RAY EXAM OF FEMUR 1: CPT

## 2017-04-05 PROCEDURE — 77030013079 HC BLNKT BAIR HGGR 3M -A: Performed by: NURSE ANESTHETIST, CERTIFIED REGISTERED

## 2017-04-05 PROCEDURE — 77030008846 HC WRE K STRY -C: Performed by: ORTHOPAEDIC SURGERY

## 2017-04-05 PROCEDURE — 74011250637 HC RX REV CODE- 250/637: Performed by: INTERNAL MEDICINE

## 2017-04-05 PROCEDURE — 77030032490 HC SLV COMPR SCD KNE COVD -B

## 2017-04-05 PROCEDURE — 77030031139 HC SUT VCRL2 J&J -A: Performed by: ORTHOPAEDIC SURGERY

## 2017-04-05 PROCEDURE — 76210000016 HC OR PH I REC 1 TO 1.5 HR: Performed by: ORTHOPAEDIC SURGERY

## 2017-04-05 PROCEDURE — 77030020274 HC MISC IMPL ORTHOPEDIC: Performed by: ORTHOPAEDIC SURGERY

## 2017-04-05 PROCEDURE — 77030011640 HC PAD GRND REM COVD -A: Performed by: ORTHOPAEDIC SURGERY

## 2017-04-05 PROCEDURE — 77030028224 HC PDNG CST BSNM -A: Performed by: ORTHOPAEDIC SURGERY

## 2017-04-05 PROCEDURE — 77030026438 HC STYL ET INTUB CARD -A: Performed by: NURSE ANESTHETIST, CERTIFIED REGISTERED

## 2017-04-05 PROCEDURE — 74011000250 HC RX REV CODE- 250

## 2017-04-05 PROCEDURE — 85610 PROTHROMBIN TIME: CPT | Performed by: HOSPITALIST

## 2017-04-05 DEVICE — LOCKING SCREW, FULLY THREADED: Type: IMPLANTABLE DEVICE | Site: HIP | Status: FUNCTIONAL

## 2017-04-05 RX ORDER — MIDAZOLAM HYDROCHLORIDE 1 MG/ML
1 INJECTION, SOLUTION INTRAMUSCULAR; INTRAVENOUS AS NEEDED
Status: DISCONTINUED | OUTPATIENT
Start: 2017-04-05 | End: 2017-04-05 | Stop reason: HOSPADM

## 2017-04-05 RX ORDER — PROPOFOL 10 MG/ML
INJECTION, EMULSION INTRAVENOUS AS NEEDED
Status: DISCONTINUED | OUTPATIENT
Start: 2017-04-05 | End: 2017-04-05 | Stop reason: HOSPADM

## 2017-04-05 RX ORDER — GLYCOPYRROLATE 0.2 MG/ML
INJECTION INTRAMUSCULAR; INTRAVENOUS AS NEEDED
Status: DISCONTINUED | OUTPATIENT
Start: 2017-04-05 | End: 2017-04-05 | Stop reason: HOSPADM

## 2017-04-05 RX ORDER — SODIUM CHLORIDE 0.9 % (FLUSH) 0.9 %
5-10 SYRINGE (ML) INJECTION AS NEEDED
Status: DISCONTINUED | OUTPATIENT
Start: 2017-04-05 | End: 2017-04-05 | Stop reason: HOSPADM

## 2017-04-05 RX ORDER — PHENYLEPHRINE HCL IN 0.9% NACL 0.4MG/10ML
SYRINGE (ML) INTRAVENOUS AS NEEDED
Status: DISCONTINUED | OUTPATIENT
Start: 2017-04-05 | End: 2017-04-05 | Stop reason: HOSPADM

## 2017-04-05 RX ORDER — CLOBETASOL PROPIONATE 0.5 MG/G
CREAM TOPICAL 2 TIMES DAILY
Status: DISCONTINUED | OUTPATIENT
Start: 2017-04-06 | End: 2017-04-07 | Stop reason: HOSPADM

## 2017-04-05 RX ORDER — AMOXICILLIN 250 MG
1 CAPSULE ORAL 2 TIMES DAILY
Status: DISCONTINUED | OUTPATIENT
Start: 2017-04-06 | End: 2017-04-07 | Stop reason: HOSPADM

## 2017-04-05 RX ORDER — SODIUM CHLORIDE 9 MG/ML
1000 INJECTION, SOLUTION INTRAVENOUS CONTINUOUS
Status: DISCONTINUED | OUTPATIENT
Start: 2017-04-05 | End: 2017-04-05 | Stop reason: HOSPADM

## 2017-04-05 RX ORDER — MORPHINE SULFATE 4 MG/ML
INJECTION, SOLUTION INTRAMUSCULAR; INTRAVENOUS AS NEEDED
Status: DISCONTINUED | OUTPATIENT
Start: 2017-04-05 | End: 2017-04-05 | Stop reason: HOSPADM

## 2017-04-05 RX ORDER — SODIUM CHLORIDE, SODIUM LACTATE, POTASSIUM CHLORIDE, CALCIUM CHLORIDE 600; 310; 30; 20 MG/100ML; MG/100ML; MG/100ML; MG/100ML
125 INJECTION, SOLUTION INTRAVENOUS CONTINUOUS
Status: DISCONTINUED | OUTPATIENT
Start: 2017-04-05 | End: 2017-04-05 | Stop reason: HOSPADM

## 2017-04-05 RX ORDER — NALOXONE HYDROCHLORIDE 0.4 MG/ML
0.4 INJECTION, SOLUTION INTRAMUSCULAR; INTRAVENOUS; SUBCUTANEOUS AS NEEDED
Status: DISCONTINUED | OUTPATIENT
Start: 2017-04-05 | End: 2017-04-07 | Stop reason: HOSPADM

## 2017-04-05 RX ORDER — MIDAZOLAM HYDROCHLORIDE 1 MG/ML
0.5 INJECTION, SOLUTION INTRAMUSCULAR; INTRAVENOUS
Status: DISCONTINUED | OUTPATIENT
Start: 2017-04-05 | End: 2017-04-05 | Stop reason: HOSPADM

## 2017-04-05 RX ORDER — SODIUM CHLORIDE, SODIUM LACTATE, POTASSIUM CHLORIDE, CALCIUM CHLORIDE 600; 310; 30; 20 MG/100ML; MG/100ML; MG/100ML; MG/100ML
INJECTION, SOLUTION INTRAVENOUS
Status: DISCONTINUED | OUTPATIENT
Start: 2017-04-05 | End: 2017-04-05 | Stop reason: HOSPADM

## 2017-04-05 RX ORDER — HYDROXYZINE HYDROCHLORIDE 10 MG/1
10 TABLET, FILM COATED ORAL
Status: DISCONTINUED | OUTPATIENT
Start: 2017-04-05 | End: 2017-04-07 | Stop reason: HOSPADM

## 2017-04-05 RX ORDER — ONDANSETRON 4 MG/1
4 TABLET, ORALLY DISINTEGRATING ORAL
Status: DISCONTINUED | OUTPATIENT
Start: 2017-04-05 | End: 2017-04-07 | Stop reason: HOSPADM

## 2017-04-05 RX ORDER — SODIUM CHLORIDE 9 MG/ML
100 INJECTION, SOLUTION INTRAVENOUS CONTINUOUS
Status: DISCONTINUED | OUTPATIENT
Start: 2017-04-05 | End: 2017-04-07

## 2017-04-05 RX ORDER — TRAMADOL HYDROCHLORIDE 50 MG/1
50 TABLET ORAL
Status: DISCONTINUED | OUTPATIENT
Start: 2017-04-05 | End: 2017-04-07 | Stop reason: HOSPADM

## 2017-04-05 RX ORDER — POLYETHYLENE GLYCOL 3350 17 G/17G
17 POWDER, FOR SOLUTION ORAL DAILY
Status: DISCONTINUED | OUTPATIENT
Start: 2017-04-06 | End: 2017-04-07 | Stop reason: HOSPADM

## 2017-04-05 RX ORDER — SUCCINYLCHOLINE CHLORIDE 20 MG/ML
INJECTION INTRAMUSCULAR; INTRAVENOUS AS NEEDED
Status: DISCONTINUED | OUTPATIENT
Start: 2017-04-05 | End: 2017-04-05 | Stop reason: HOSPADM

## 2017-04-05 RX ORDER — FENTANYL CITRATE 50 UG/ML
25 INJECTION, SOLUTION INTRAMUSCULAR; INTRAVENOUS
Status: DISCONTINUED | OUTPATIENT
Start: 2017-04-05 | End: 2017-04-05 | Stop reason: HOSPADM

## 2017-04-05 RX ORDER — SODIUM CHLORIDE 9 MG/ML
50 INJECTION, SOLUTION INTRAVENOUS CONTINUOUS
Status: DISCONTINUED | OUTPATIENT
Start: 2017-04-05 | End: 2017-04-05 | Stop reason: HOSPADM

## 2017-04-05 RX ORDER — CEFAZOLIN SODIUM IN 0.9 % NACL 2 G/50 ML
2 INTRAVENOUS SOLUTION, PIGGYBACK (ML) INTRAVENOUS EVERY 8 HOURS
Status: DISCONTINUED | OUTPATIENT
Start: 2017-04-05 | End: 2017-04-05 | Stop reason: SDUPTHER

## 2017-04-05 RX ORDER — ACETAMINOPHEN 325 MG/1
650 TABLET ORAL EVERY 6 HOURS
Status: DISCONTINUED | OUTPATIENT
Start: 2017-04-06 | End: 2017-04-06

## 2017-04-05 RX ORDER — ZINC OXIDE 20 G/100G
OINTMENT TOPICAL
Status: DISCONTINUED | OUTPATIENT
Start: 2017-04-05 | End: 2017-04-07 | Stop reason: HOSPADM

## 2017-04-05 RX ORDER — DEXAMETHASONE SODIUM PHOSPHATE 4 MG/ML
INJECTION, SOLUTION INTRA-ARTICULAR; INTRALESIONAL; INTRAMUSCULAR; INTRAVENOUS; SOFT TISSUE AS NEEDED
Status: DISCONTINUED | OUTPATIENT
Start: 2017-04-05 | End: 2017-04-05 | Stop reason: HOSPADM

## 2017-04-05 RX ORDER — LIDOCAINE HYDROCHLORIDE 10 MG/ML
0.1 INJECTION, SOLUTION EPIDURAL; INFILTRATION; INTRACAUDAL; PERINEURAL AS NEEDED
Status: DISCONTINUED | OUTPATIENT
Start: 2017-04-05 | End: 2017-04-05 | Stop reason: HOSPADM

## 2017-04-05 RX ORDER — ENOXAPARIN SODIUM 100 MG/ML
40 INJECTION SUBCUTANEOUS DAILY
Status: DISCONTINUED | OUTPATIENT
Start: 2017-04-06 | End: 2017-04-07 | Stop reason: HOSPADM

## 2017-04-05 RX ORDER — SODIUM CHLORIDE 0.9 % (FLUSH) 0.9 %
5-10 SYRINGE (ML) INJECTION EVERY 8 HOURS
Status: DISCONTINUED | OUTPATIENT
Start: 2017-04-05 | End: 2017-04-05 | Stop reason: HOSPADM

## 2017-04-05 RX ORDER — OXYCODONE AND ACETAMINOPHEN 5; 325 MG/1; MG/1
1 TABLET ORAL AS NEEDED
Status: DISCONTINUED | OUTPATIENT
Start: 2017-04-05 | End: 2017-04-05 | Stop reason: HOSPADM

## 2017-04-05 RX ORDER — FENTANYL CITRATE 50 UG/ML
50 INJECTION, SOLUTION INTRAMUSCULAR; INTRAVENOUS AS NEEDED
Status: DISCONTINUED | OUTPATIENT
Start: 2017-04-05 | End: 2017-04-05 | Stop reason: HOSPADM

## 2017-04-05 RX ORDER — SODIUM CHLORIDE 0.9 % (FLUSH) 0.9 %
5-10 SYRINGE (ML) INJECTION AS NEEDED
Status: DISCONTINUED | OUTPATIENT
Start: 2017-04-05 | End: 2017-04-07 | Stop reason: HOSPADM

## 2017-04-05 RX ORDER — FENTANYL CITRATE 50 UG/ML
INJECTION, SOLUTION INTRAMUSCULAR; INTRAVENOUS AS NEEDED
Status: DISCONTINUED | OUTPATIENT
Start: 2017-04-05 | End: 2017-04-05 | Stop reason: HOSPADM

## 2017-04-05 RX ORDER — MORPHINE SULFATE 10 MG/ML
2 INJECTION, SOLUTION INTRAMUSCULAR; INTRAVENOUS
Status: DISCONTINUED | OUTPATIENT
Start: 2017-04-05 | End: 2017-04-05 | Stop reason: HOSPADM

## 2017-04-05 RX ORDER — SODIUM CHLORIDE 0.9 % (FLUSH) 0.9 %
5-10 SYRINGE (ML) INJECTION EVERY 8 HOURS
Status: DISCONTINUED | OUTPATIENT
Start: 2017-04-06 | End: 2017-04-07 | Stop reason: HOSPADM

## 2017-04-05 RX ORDER — ROCURONIUM BROMIDE 10 MG/ML
INJECTION, SOLUTION INTRAVENOUS AS NEEDED
Status: DISCONTINUED | OUTPATIENT
Start: 2017-04-05 | End: 2017-04-05 | Stop reason: HOSPADM

## 2017-04-05 RX ORDER — CEFAZOLIN SODIUM IN 0.9 % NACL 2 G/100 ML
PLASTIC BAG, INJECTION (ML) INTRAVENOUS AS NEEDED
Status: DISCONTINUED | OUTPATIENT
Start: 2017-04-05 | End: 2017-04-05 | Stop reason: SDUPTHER

## 2017-04-05 RX ORDER — ONDANSETRON 2 MG/ML
4 INJECTION INTRAMUSCULAR; INTRAVENOUS AS NEEDED
Status: DISCONTINUED | OUTPATIENT
Start: 2017-04-05 | End: 2017-04-05 | Stop reason: HOSPADM

## 2017-04-05 RX ORDER — LIDOCAINE HYDROCHLORIDE 20 MG/ML
INJECTION, SOLUTION EPIDURAL; INFILTRATION; INTRACAUDAL; PERINEURAL AS NEEDED
Status: DISCONTINUED | OUTPATIENT
Start: 2017-04-05 | End: 2017-04-05 | Stop reason: HOSPADM

## 2017-04-05 RX ORDER — HYDROMORPHONE HYDROCHLORIDE 1 MG/ML
0.2 INJECTION, SOLUTION INTRAMUSCULAR; INTRAVENOUS; SUBCUTANEOUS
Status: DISCONTINUED | OUTPATIENT
Start: 2017-04-05 | End: 2017-04-05 | Stop reason: HOSPADM

## 2017-04-05 RX ORDER — ONDANSETRON 2 MG/ML
4 INJECTION INTRAMUSCULAR; INTRAVENOUS
Status: ACTIVE | OUTPATIENT
Start: 2017-04-05 | End: 2017-04-06

## 2017-04-05 RX ORDER — DIPHENHYDRAMINE HYDROCHLORIDE 50 MG/ML
12.5 INJECTION, SOLUTION INTRAMUSCULAR; INTRAVENOUS AS NEEDED
Status: DISCONTINUED | OUTPATIENT
Start: 2017-04-05 | End: 2017-04-05 | Stop reason: HOSPADM

## 2017-04-05 RX ORDER — CEFEPIME HYDROCHLORIDE 1 G/1
1 INJECTION, POWDER, FOR SOLUTION INTRAMUSCULAR; INTRAVENOUS EVERY 12 HOURS
Status: DISCONTINUED | OUTPATIENT
Start: 2017-04-05 | End: 2017-04-05 | Stop reason: CLARIF

## 2017-04-05 RX ORDER — NEOSTIGMINE METHYLSULFATE 1 MG/ML
INJECTION INTRAVENOUS AS NEEDED
Status: DISCONTINUED | OUTPATIENT
Start: 2017-04-05 | End: 2017-04-05 | Stop reason: HOSPADM

## 2017-04-05 RX ORDER — FACIAL-BODY WIPES
10 EACH TOPICAL DAILY PRN
Status: DISCONTINUED | OUTPATIENT
Start: 2017-04-07 | End: 2017-04-07 | Stop reason: HOSPADM

## 2017-04-05 RX ORDER — FERROUS SULFATE, DRIED 160(50) MG
1 TABLET, EXTENDED RELEASE ORAL
Status: DISCONTINUED | OUTPATIENT
Start: 2017-04-06 | End: 2017-04-07 | Stop reason: HOSPADM

## 2017-04-05 RX ADMIN — FLUTICASONE PROPIONATE 2 SPRAY: 50 SPRAY, METERED NASAL at 10:27

## 2017-04-05 RX ADMIN — FENTANYL CITRATE 50 MCG: 50 INJECTION, SOLUTION INTRAMUSCULAR; INTRAVENOUS at 17:16

## 2017-04-05 RX ADMIN — FENTANYL CITRATE 50 MCG: 50 INJECTION, SOLUTION INTRAMUSCULAR; INTRAVENOUS at 17:32

## 2017-04-05 RX ADMIN — MORPHINE SULFATE 2 MG: 10 INJECTION, SOLUTION INTRAVENOUS at 19:35

## 2017-04-05 RX ADMIN — MORPHINE SULFATE 2 MG: 4 INJECTION, SOLUTION INTRAMUSCULAR; INTRAVENOUS at 18:02

## 2017-04-05 RX ADMIN — Medication 10 ML: at 06:13

## 2017-04-05 RX ADMIN — PROPOFOL 110 MG: 10 INJECTION, EMULSION INTRAVENOUS at 17:16

## 2017-04-05 RX ADMIN — ROCURONIUM BROMIDE 15 MG: 10 INJECTION, SOLUTION INTRAVENOUS at 17:33

## 2017-04-05 RX ADMIN — SODIUM CHLORIDE, SODIUM LACTATE, POTASSIUM CHLORIDE, CALCIUM CHLORIDE: 600; 310; 30; 20 INJECTION, SOLUTION INTRAVENOUS at 18:16

## 2017-04-05 RX ADMIN — ACETAMINOPHEN 650 MG: 325 TABLET, FILM COATED ORAL at 23:35

## 2017-04-05 RX ADMIN — MORPHINE SULFATE 2 MG: 10 INJECTION, SOLUTION INTRAVENOUS at 19:30

## 2017-04-05 RX ADMIN — GLYCOPYRROLATE 0.4 MG: 0.2 INJECTION INTRAMUSCULAR; INTRAVENOUS at 18:36

## 2017-04-05 RX ADMIN — FENTANYL CITRATE 50 MCG: 50 INJECTION, SOLUTION INTRAMUSCULAR; INTRAVENOUS at 18:26

## 2017-04-05 RX ADMIN — ROCURONIUM BROMIDE 10 MG: 10 INJECTION, SOLUTION INTRAVENOUS at 17:45

## 2017-04-05 RX ADMIN — SUCCINYLCHOLINE CHLORIDE 80 MG: 20 INJECTION INTRAMUSCULAR; INTRAVENOUS at 17:16

## 2017-04-05 RX ADMIN — MORPHINE SULFATE 2 MG: 4 INJECTION, SOLUTION INTRAMUSCULAR; INTRAVENOUS at 19:00

## 2017-04-05 RX ADMIN — Medication 10 ML: at 23:30

## 2017-04-05 RX ADMIN — ONDANSETRON 4 MG: 2 INJECTION INTRAMUSCULAR; INTRAVENOUS at 21:30

## 2017-04-05 RX ADMIN — CASTOR OIL AND BALSAM, PERU: 788; 87 OINTMENT TOPICAL at 10:27

## 2017-04-05 RX ADMIN — LIDOCAINE HYDROCHLORIDE 70 MG: 20 INJECTION, SOLUTION EPIDURAL; INFILTRATION; INTRACAUDAL; PERINEURAL at 17:16

## 2017-04-05 RX ADMIN — ROCURONIUM BROMIDE 5 MG: 10 INJECTION, SOLUTION INTRAVENOUS at 17:16

## 2017-04-05 RX ADMIN — Medication 80 MCG: at 17:19

## 2017-04-05 RX ADMIN — DEXAMETHASONE SODIUM PHOSPHATE 4 MG: 4 INJECTION, SOLUTION INTRA-ARTICULAR; INTRALESIONAL; INTRAMUSCULAR; INTRAVENOUS; SOFT TISSUE at 17:30

## 2017-04-05 RX ADMIN — MORPHINE SULFATE 2 MG: 10 INJECTION, SOLUTION INTRAVENOUS at 19:42

## 2017-04-05 RX ADMIN — Medication 10 ML: at 15:58

## 2017-04-05 RX ADMIN — ROSUVASTATIN CALCIUM 20 MG: 10 TABLET, FILM COATED ORAL at 23:30

## 2017-04-05 RX ADMIN — ROCURONIUM BROMIDE 10 MG: 10 INJECTION, SOLUTION INTRAVENOUS at 18:09

## 2017-04-05 RX ADMIN — Medication 2 G: at 17:26

## 2017-04-05 RX ADMIN — SODIUM CHLORIDE: 900 INJECTION, SOLUTION INTRAVENOUS at 16:45

## 2017-04-05 RX ADMIN — Medication 120 MCG: at 17:45

## 2017-04-05 RX ADMIN — NEOSTIGMINE METHYLSULFATE 3 MG: 1 INJECTION INTRAVENOUS at 18:37

## 2017-04-05 RX ADMIN — CEFEPIME 2 G: 2 INJECTION, POWDER, FOR SOLUTION INTRAVENOUS at 23:30

## 2017-04-05 RX ADMIN — ROCURONIUM BROMIDE 10 MG: 10 INJECTION, SOLUTION INTRAVENOUS at 17:43

## 2017-04-05 RX ADMIN — ACETAMINOPHEN 650 MG: 325 TABLET, FILM COATED ORAL at 00:02

## 2017-04-05 RX ADMIN — METOPROLOL TARTRATE 12.5 MG: 25 TABLET ORAL at 10:27

## 2017-04-05 RX ADMIN — CEFTRIAXONE 1 G: 1 INJECTION, POWDER, FOR SOLUTION INTRAMUSCULAR; INTRAVENOUS at 15:57

## 2017-04-05 RX ADMIN — ACETAMINOPHEN 650 MG: 325 TABLET, FILM COATED ORAL at 23:29

## 2017-04-05 RX ADMIN — MORPHINE SULFATE 2 MG: 10 INJECTION, SOLUTION INTRAVENOUS at 19:55

## 2017-04-05 RX ADMIN — MORPHINE SULFATE 2 MG: 10 INJECTION, SOLUTION INTRAVENOUS at 19:50

## 2017-04-05 RX ADMIN — SODIUM CHLORIDE 125 ML/HR: 900 INJECTION, SOLUTION INTRAVENOUS at 21:41

## 2017-04-05 NOTE — PROGRESS NOTES
Deferred visit: Noted patient has a left femur fx. PT deferred at this time. Will continue to follow.

## 2017-04-05 NOTE — ANESTHESIA PREPROCEDURE EVALUATION
Anesthetic History   No history of anesthetic complications            Review of Systems / Medical History  Patient summary reviewed, nursing notes reviewed and pertinent labs reviewed    Pulmonary  Within defined limits                 Neuro/Psych   Within defined limits           Cardiovascular    Hypertension        Dysrhythmias            GI/Hepatic/Renal  Within defined limits              Endo/Other        Arthritis     Other Findings            Physical Exam    Airway  Mallampati: II  TM Distance: > 6 cm  Neck ROM: normal range of motion   Mouth opening: Normal     Cardiovascular  Regular rate and rhythm,  S1 and S2 normal,  no murmur, click, rub, or gallop             Dental    Dentition: Edentulous     Pulmonary  Breath sounds clear to auscultation               Abdominal  GI exam deferred       Other Findings            Anesthetic Plan    ASA: 3  Anesthesia type: general          Induction: Intravenous  Anesthetic plan and risks discussed with: Patient

## 2017-04-05 NOTE — PROGRESS NOTES
Occupational Therapy    Chart reviewed, spoke with RN. Pt with hip fracture, awaiting ortho recommendations, and anticipating surgical intervention. Pt not appropriate to participate with OT evaluation. Will follow up as able and appropriate.  Soetro Paul OTR/L

## 2017-04-05 NOTE — OP NOTES
Name: Tomas Saldivar  MRN:  [id number]  : 1935  Age:  [de-identified] y.o. Surgery Date: 2017      OPERATIVE REPORT -LEFT HIP CEPHALOMEDULLARY NAIL      PREOPERATIVE DIAGNOSIS: Left hip fracture    POSTOPERATIVE DIAGNOSIS: Left hip fracture    PROCEDURE PERFORMED: Cephalomedullary nailing left hip    SURGEON: Melanie Swanson MD      ANESTHESIA: General    PRE-OP ANTIBIOTIC: Ancef 2g    COMPLICATIONS: None. ESTIMATED BLOOD LOSS: 100 mL    SPECIMENS REMOVED: None. COMPONENTS IMPLANTED:   Implant Name Type Inv. Item Serial No.  Lot No. LRB No. Used Action   Gamma long nail   [id number]  [id number] Left 1 Implanted   Lag screw   NA  [id number] Left 1 Implanted   SCR BNE LCK T2 FT 5X42. 5MM TI -- STRL - SNA   SCR BNE LCK T2 FT 5X42. 5MM TI -- STRL NA [employer] HOWM [id number] Left 1 Implanted       INDICATIONS: The patient is an [de-identified] yrs male with a left hip fracture. He is a poor historian and doesn't recall when he may have fallen. He does endorse acute increase in left hip pain over the last 2-3 weeks. Pre-operative medical optimization was confirmed with the medical team.  Risks, benefits, alternatives of the procedure were reviewed with him in detail and he desires to proceed. He understands the increased risk for perioperative medical complications due to his medical comorbidities. DESCRIPTION OF PROCEDURE: Anesthetic was initiated. Preoperative dose of IV  antibiotic was given. Busby catheter was placed. The left side was confirmed as the operative side, prepped and draped in the usual sterile fashion. Skin was covered with Ioban occlusive dressing. After a time out was held, xrays were taken to confirm fracture site as well as entry for skin incision. We also confirmed the acute nature of fracture with live fluoro during traction. There was movement at IT fracture site with traction and decision was made to proceed with IM nail.   An incision was made proximal to the tip of the greater trochanter and carried down through skin and subcutaneous tissue. The IT band was incised and a hackett scissor was used to clear a track to the greater trochanter. A guide wire was drilled into the tip of the greater trochanter and position was check under fluoroscopy. An exntry reamer was then used with a protective sleeve to gain access to the intramedullary canal. A ball tipped guidewire was advanced to the physeal scar and the length was measure. Flexible reamers were used sequentially to 1.5mm above the planned nail. The nail was then opened (size mentioned above) and advanced. Position was confirmed under fluoroscopy. Using the 125 degree jig a skin incision was made in the appropriate position and a guide wire was drilled to within 2 mm of the cortex of the femoral head. This was again check fluoroscopically in AP and lateral planes and the wire was then measured and overdrilled. The above mentioned cephalomedullary screw was advanced and locked into place. One screw was placed distally using perfect [place] technique. Final shots were taken. After copious irrigation, the wounds were closed with #2 Vicryl sutures. I irrigated the skin, subcutaneous tissue. Soft tissues were infiltrated with local anesthetic. Skin and subcutaneous were closed in a standard fashion with 2-0 vicryl and 3-0 monocryl followed by Dermabond. Sterile dressings were applied. There were no complications. No specimen was sent. The patient was transferred to the recovery room in stable condition. I was present for the entirety of the case.     Terrence Silva MD

## 2017-04-05 NOTE — ROUTINE PROCESS
Bedside and Verbal shift change report given to Serenity Madsen RN (oncoming nurse) by An Sin RN (offgoing nurse). Report included the following information SBAR, Kardex, MAR and Recent Results.

## 2017-04-05 NOTE — PROGRESS NOTES
Reviewed medical chart; [facility] can accept the patient into care at discharge. Alfonso Connell, [address] [facility] coordinator (W#[phone]) called today and asked for an update. Explained to her that he may need rehabilitation and is not yet ready for discharge. Also called to update the patient's daughter, Harry Ragsdale (U#[phone]); left a voicemail. Care Management will continue to follow his disposition.    CASSIUS Bahena

## 2017-04-05 NOTE — CONSULTS
FRACTURE CONSULT NOTE    Subjective:     Date of Consultation:  April 5, 2017      Seda Rosado is a [de-identified] y.o. male who is being seen for left hip pain. Pt is a wheelchair ambulator and resides in [facility]. The reason for his wheelchair chair use is vague but said he had right knee replacement in 2014. Reviewing past surgical history reveals remote lumbar decompression in the 70's, and right TKA 2/13/14 by Dr. Chase Mcdaniel. He denies any recent falls, or event precipitating his current symptoms. Review of admission H&P shows chief complaint being right leg pain. Currently he is C/O left hip pain. See below for PMH.  Admitted for JAMES, and UTI    Patient Active Problem List    Diagnosis Date Noted    Urinary tract infection 04/01/2017    Acute kidney injury ([address] 04/01/2017    UTI (urinary tract infection) 11/07/2016    Renal failure 01/23/2015    Falls 01/23/2015    Rhabdomyolysis 01/23/2015    Weight loss 01/23/2015    Night sweats 01/23/2015    Anorexia 01/23/2015    Anemia 01/23/2015    Weakness 03/20/2013    Sinus tachycardia 03/20/2013    ARF (acute renal failure) ([address] 03/20/2013    Peripheral neuropathy ([address] 03/20/2013    Hypercholesteremia     Hypertension     Arthritis     Tremor 11/26/2012     Family History   Problem Relation Age of Onset    Stroke Mother     Heart Disease Father     Cancer Brother      lung    Other Brother      NON ALCOHOLIC CIRRHOSIS      Social History   Substance Use Topics    Smoking status: Former Smoker     Years: 16.00     Quit date: 1/23/1971    Smokeless tobacco: Not on file    Alcohol use No     Past Medical History:   Diagnosis Date    Arrhythmia     RAPID HEARTBEAT    Arthritis     Constipation     Hearing loss     Heart disease     Hypercholesteremia     Hypertension     Joint pain     Memory disorder       Past Surgical History:   Procedure Laterality Date    HX BACK SURGERY  1974    HX CATARACT REMOVAL      LEFT    HX COLONOSCOPY  HX HERNIA REPAIR  1975    double    HX TONSILLECTOMY      UPPER GI ENDOSCOPY,BIOPSY  3/22/2013           Prior to Admission medications    Medication Sig Start Date End Date Taking? Authorizing Provider   acetaminophen (TYLENOL) 325 mg tablet Take 650 mg by mouth every eight (8) hours as needed for Pain. Yes Historical Provider   Menthol-Zinc Oxide (RISAMINE) 0.44-20.6 % oint Apply  to affected area as needed (with each diaper change). Indications: DIAPER RASH, SKIN IRRITATION   Yes Historical Provider   acetaminophen (TYLENOL) 500 mg tablet Take 500 mg by mouth daily. Yes Historical Provider   triazolam (HALCION) 0.125 mg tablet Take 0.125 mg by mouth nightly. Yes Historical Provider   fluticasone (FLONASE) 50 mcg/actuation nasal spray 2 Sprays by Both Nostrils route daily. Yes Historical Provider   docusate sodium (COLACE) 100 mg capsule Take 100 mg by mouth daily. Yes Historical Provider   clobetasol (TEMOVATE) 0.05 % topical cream Apply  to affected area two (2) times a day. Apply to leg rash. Yes Historical Provider   ferrous sulfate 325 mg (65 mg iron) tablet Take 1 tablet by mouth two (2) times daily (with meals). 1/28/15  Yes Christine Son MD   pantoprazole (PROTONIX) 40 mg tablet Take 1 tablet by mouth Before breakfast and dinner. 1/28/15  Yes Christine Son MD   metoprolol (LOPRESSOR) 25 mg tablet Take 12.5 mg by mouth two (2) times a day. Yes Historical Provider   rosuvastatin (CRESTOR) 20 mg tablet Take 20 mg by mouth nightly.    Yes Historical Provider     Current Facility-Administered Medications   Medication Dose Route Frequency    balsam peru-castor oil (VENELEX)  mg/gram ointment   Topical BID    sodium chloride (NS) flush 5-10 mL  5-10 mL IntraVENous Q8H    sodium chloride (NS) flush 5-10 mL  5-10 mL IntraVENous PRN    heparin (porcine) injection 5,000 Units  5,000 Units SubCUTAneous Q8H    cefTRIAXone (ROCEPHIN) 1 g in 0.9% sodium chloride (MBP/ADV) 50 mL  1 g IntraVENous Q24H    acetaminophen (TYLENOL) tablet 650 mg  650 mg Oral Q8H PRN    docusate sodium (COLACE) capsule 100 mg  100 mg Oral DAILY    fluticasone (FLONASE) 50 mcg/actuation nasal spray 2 Spray  2 Spray Both Nostrils DAILY    ferrous sulfate tablet 325 mg  325 mg Oral BID WITH MEALS    metoprolol tartrate (LOPRESSOR) tablet 12.5 mg  12.5 mg Oral BID    pantoprazole (PROTONIX) tablet 40 mg  40 mg Oral ACB&D    rosuvastatin (CRESTOR) tablet 20 mg  20 mg Oral QHS    zolpidem (AMBIEN) tablet 5 mg  5 mg Oral QHS      No Known Allergies     Review of Systems:  A comprehensive review of systems was negative except for that written in the HPI. Mental Status:     Objective:     No data found. Temp (24hrs), Av.3 °F (36.8 °C), Min:97.8 °F (36.6 °C), Max:99 °F (37.2 °C)      EXAM: fatigued, cooperative, delirious, mild distress, appears stated age,   Neuro: no focal deficits. Extremities:  LLE is externally rotated and foreshortened and is unable to tolerate even the slightest motion of the LLE. Mild pedal edema- no sign of lymphedema. Well healed right TKA incision with bilateral quad atrophy. Capillary refill <2 secs in bilateral lower extremites, Sensation intact in bilateral lower extremities    Imaging Review:        EXAM: CT LOW EXT LT WO CONT     INDICATION: Left lower extremity pain. No specific injury.      COMPARISON: CT pelvis on 3/20/2013. No recent comparison left hip imaging in the  [facility] system.     TECHNIQUE: Helical CT of the left lower extremity from the iliac bone to the  proximal tibia with coronal and sagittal reformats. Images reviewed in soft  tissue and bone windows. CT dose reduction was achieved through the use of a  standardized protocol tailored for this examination and automatic exposure  control for dose modulation. Adaptive statistical iterative reconstruction  (ASIR) was utilized.     CONTRAST: None.     FINDINGS: Bones: Bones are osteopenic.  Left femur intertrochanteric fracture is  comminuted, impacted, with minimal healing. There is varus deformity. There is  no other fracture.     Joint fluid: Small left hip joint effusion.     Articulations: Severe left hip osteoarthritis and mild acetabular protrusio. No  evidence of sacroiliitis.     Tendons: No full-thickness tendon tear.     Muscles: Diffuse muscle atrophy.     Soft tissue mass: None. Bladder wall is trabeculated. Lymph nodes are  physiologic.     IMPRESSION  IMPRESSION:   1. Left femur intertrochanteric comminuted fracture is impacted and likely  subacute. 2. Severe left hip osteoarthritis. Mild left acetabular protrusio.          Labs:   Recent Results (from the past 24 hour(s))   HGB & HCT    Collection Time: 04/05/17  4:50 AM   Result Value Ref Range    HGB 8.2 (L) 12.1 - 17.0 g/dL    HCT 25.9 (L) 36.6 - 22.5 %   METABOLIC PANEL, BASIC    Collection Time: 04/05/17  4:50 AM   Result Value Ref Range    Sodium 145 136 - 145 mmol/L    Potassium 4.2 3.5 - 5.1 mmol/L    Chloride 113 (H) 97 - 108 mmol/L    CO2 22 21 - 32 mmol/L    Anion gap 10 5 - 15 mmol/L    Glucose 92 65 - 100 mg/dL    BUN 42 (H) 6 - 20 MG/DL    Creatinine 1.60 (H) 0.70 - 1.30 MG/DL    BUN/Creatinine ratio 26 (H) 12 - 20      GFR est AA 51 (L) >60 ml/min/1.73m2    GFR est non-AA 42 (L) >60 ml/min/1.73m2    Calcium 7.5 (L) 8.5 - 10.1 MG/DL   PROTHROMBIN TIME + INR    Collection Time: 04/05/17  4:50 AM   Result Value Ref Range    INR 1.1 0.9 - 1.1      Prothrombin time 11.4 (H) 9.0 - 11.1 sec         Impression:     Patient Active Problem List    Diagnosis Date Noted    Urinary tract infection 04/01/2017    Acute kidney injury ([facility] 04/01/2017    UTI (urinary tract infection) 11/07/2016    Renal failure 01/23/2015    Falls 01/23/2015    Rhabdomyolysis 01/23/2015    Weight loss 01/23/2015    Night sweats 01/23/2015    Anorexia 01/23/2015    Anemia 01/23/2015    Weakness 03/20/2013    Sinus tachycardia 03/20/2013    ARF (acute renal failure) ([facility]) 03/20/2013    Peripheral neuropathy ([facility] 03/20/2013    Hypercholesteremia     Hypertension     Arthritis     Tremor 11/26/2012     Active Problems:    Urinary tract infection (4/1/2017)      Acute kidney injury ([facility] (4/1/2017)        Plan:   Pt.stable but chronically debilitated which will likely factor in during his recovery  Details of precipitating factor(s) for cause of left IT hip fracture are unclear, nonetheless he has significant pain and debility, and therefore a candidate for operative fixation of the left hip with an intramedullary odette. Reviewed with Dr. Luci Grant with plan to take to surgery this evening. Consent signed and in chart. Will contact his daughter, Susan Flores to review details and post op rehab- likely back to [facility]  NPO. IV fluids. Analgesics      ANA CRISTINA Gregorio    Patient seen and examined. Agree with above. Patient has had several fall over the past few years but does not recall a recent fall. He reports 2-3 weeks of acute on chronic left hip pain. He is wheelchair bound and does not walk. He complains of moderate to severe left groin and thigh pain. CT reveals peritroch femur fracture, but is unclear regarding chronicity. Exam reveals signficant pain with any ROM of the left leg. Mild pain with movement of right leg. Long discussion held with patient regarding plan. Because it is unclear when this happened, we may not be able to reduce the fracture. I told him that if this is truly more than a year old then our options are limited, particularly considering he is a non ambulator. If the fracture doesn't move on traction table, then we would not purse further management. If we are able to obtain reduction, we would plan for IM nail. Risk and benefits discussed with patient. He understands and agrees to proceed.      Rebecca Olsen MD

## 2017-04-05 NOTE — PROGRESS NOTES
Hospitalist Progress Note  Ferny Seth MD  Office: [phone]        Date of Service:  2017  NAME:  Aidan Matthew  :  1935  MRN:  [id number]      Admission Summary:     Patient is an 45-year-old white male who lives in assisted living with his wife. He states that he does not walk at all, and uses a wheelchair. Patient presents with complaints of right leg pain. Interval history / Subjective:   Seen prior to OR. No complaints. Again he denied recent fall. Assessment & Plan:     Leg pain bilateral, acute on chronic. Denied fall  -Exam is benign  -Doppler negative for VTE  -Pelvic CT with left femur fracture acute vs subacute  -Orthopedics consulted,for OR today    Pseudomonas UTI was on IV ceftriaxone,pesudomonas resistant to FLQ  -switched abx to cefepime    Chronic kidney disease, stage III. - Creatinine about base line    Mild hypernatremia  -Will give him NS. Improved    Chronic lymphedema of the lower extremities. Ultrasound has ruled out acute deep in thrombosis. Echocardiogram from a few months ago () is essentially normal. ProBNP is in normal range. This is not related to heart failure. Impaired mobility  -WC at baseline  -decline from base line may be due to left femur fracture,severe OA  -May need SNF    Anemia of chronic disease.  -Hb dropped,no apparent reason,?dilutional,had bolus of 2L NS.  -H up and stable, drop likely was dilutional.      Code status: full  DVT prophylaxis:heparin    Care Plan discussed with: Patient/Family and Nurse  Disposition:janet to need SNF post hip surgery      Hospital Problems  Date Reviewed: 11/10/2016          Codes Class Noted POA    Urinary tract infection ICD-10-CM: N39.0  ICD-9-CM: 599.0  2017 Unknown        Acute kidney injury [facility]) ICD-10-CM: N17.9  ICD-9-CM: 584.9  2017 Unknown                Review of Systems:   Pertinent items are noted in HPI. Vital Signs:    Last 24hrs VS reviewed since prior progress note. Most recent are:  Visit Vitals    /70 (BP 1 Location: Right arm, BP Patient Position: At rest)    Pulse 84    Temp 97.9 °F (36.6 °C)    Resp 18    Ht 5' 7\" (1.702 m)    Wt 68 kg (150 lb)    SpO2 94%    BMI 23.49 kg/m2         Intake/Output Summary (Last 24 hours) at 04/05/17 1757  Last data filed at 04/05/17 1723   Gross per 24 hour   Intake                0 ml   Output             1700 ml   Net            -1700 ml        Physical Examination:             Constitutional:  No acute distress, cooperative,not in distress   ENT:  Oral mucous moist, oropharynx benign. Resp:  CTA bilaterally. No wheezing/rhonchi/rales. No accessory muscle use   CV:  Regular rhythm, normal rate, no murmurs, gallops, rubs    GI:  Soft, non distended, non tender. normoactive bowel sounds, no hepatosplenomegaly     Musculoskeletal:  Chronic looking leg edema, no erythema, discharge,scar of knee surgery. Neurologic:  Moves all extremities. Alert and oriented. Legs are weak     Psych:  Good insight, Not anxious nor agitated. Data Review:    Review and/or order of clinical lab test  Review and/or order of tests in the radiology section of CPT  Review and/or order of tests in the medicine section of CPT      Labs:     Recent Labs      04/05/17 0450 04/04/17   0059   HGB  8.2*  7.9*   HCT  25.9*  24.9*     Recent Labs      04/05/17   0450  04/03/17   0343   NA  145  146*   K  4.2  4.4   CL  113*  116*   CO2  22  22   BUN  42*  45*   CREA  1.60*  1.81*   GLU  92  87   CA  7.5*  7.4*     No results for input(s): SGOT, GPT, ALT, AP, TBIL, TBILI, TP, ALB, GLOB, GGT, AML, LPSE in the last 72 hours. No lab exists for component: AMYP, HLPSE  Recent Labs      04/05/17   0450   INR  1.1   PTP  11.4*      No results for input(s): FE, TIBC, PSAT, FERR in the last 72 hours.    Lab Results   Component Value Date/Time    Folate 8.4 01/23/2015 07:19 PM      No results for input(s): PH, PCO2, PO2 in the last 72 hours. No results for input(s): CPK, CKNDX, TROIQ in the last 72 hours.     No lab exists for component: CPKMB  No results found for: CHOL, CHOLX, CHLST, CHOLV, HDL, LDL, DLDL, LDLC, DLDLP, TGL, TGLX, TRIGL, TRIGP, CHHD, CHHDX  Lab Results   Component Value Date/Time    Glucose (POC) 118 01/27/2015 12:15 PM    Glucose (POC) 117 01/27/2015 07:26 AM    Glucose (POC) 168 01/26/2015 09:03 PM    Glucose (POC) 146 01/26/2015 04:26 PM    Glucose (POC) 144 01/26/2015 11:27 AM     Lab Results   Component Value Date/Time    Color YELLOW/STRAW 04/02/2017 03:30 PM    Appearance TURBID 04/02/2017 03:30 PM    Specific gravity 1.017 04/02/2017 03:30 PM    pH (UA) 5.5 04/02/2017 03:30 PM    Protein TRACE 04/02/2017 03:30 PM    Glucose NEGATIVE  04/02/2017 03:30 PM    Ketone NEGATIVE  04/02/2017 03:30 PM    Bilirubin NEGATIVE  04/02/2017 03:30 PM    Urobilinogen 0.2 04/02/2017 03:30 PM    Nitrites NEGATIVE  04/02/2017 03:30 PM    Leukocyte Esterase LARGE 04/02/2017 03:30 PM    Epithelial cells FEW 04/02/2017 03:30 PM    Bacteria NEGATIVE  04/02/2017 03:30 PM    WBC >100 04/02/2017 03:30 PM    RBC 0-5 04/02/2017 03:30 PM         Medications Reviewed:     Current Facility-Administered Medications   Medication Dose Route Frequency    lactated ringers infusion  125 mL/hr IntraVENous CONTINUOUS    0.9% sodium chloride infusion  50 mL/hr IntraVENous CONTINUOUS    sodium chloride (NS) flush 5-10 mL  5-10 mL IntraVENous Q8H    sodium chloride (NS) flush 5-10 mL  5-10 mL IntraVENous PRN    lidocaine (PF) (XYLOCAINE) 10 mg/mL (1 %) injection 0.1 mL  0.1 mL SubCUTAneous PRN    fentaNYL citrate (PF) injection 50 mcg  50 mcg IntraVENous PRN    midazolam (VERSED) injection 1 mg  1 mg IntraVENous PRN    midazolam (VERSED) injection 1 mg  1 mg IntraVENous PRN    balsam peru-castor oil (VENELEX)  mg/gram ointment   Topical BID    sodium chloride (NS) flush 5-10 mL  5-10 mL IntraVENous Q8H    sodium chloride (NS) flush 5-10 mL  5-10 mL IntraVENous PRN    heparin (porcine) injection 5,000 Units  5,000 Units SubCUTAneous Q8H    cefTRIAXone (ROCEPHIN) 1 g in 0.9% sodium chloride (MBP/ADV) 50 mL  1 g IntraVENous Q24H    acetaminophen (TYLENOL) tablet 650 mg  650 mg Oral Q8H PRN    docusate sodium (COLACE) capsule 100 mg  100 mg Oral DAILY    fluticasone (FLONASE) 50 mcg/actuation nasal spray 2 Spray  2 Spray Both Nostrils DAILY    ferrous sulfate tablet 325 mg  325 mg Oral BID WITH MEALS    metoprolol tartrate (LOPRESSOR) tablet 12.5 mg  12.5 mg Oral BID    pantoprazole (PROTONIX) tablet 40 mg  40 mg Oral ACB&D    rosuvastatin (CRESTOR) tablet 20 mg  20 mg Oral QHS    zolpidem (AMBIEN) tablet 5 mg  5 mg Oral QHS     Facility-Administered Medications Ordered in Other Encounters   Medication Dose Route Frequency    PHENYLephrine (NEOSYNEPHRINE) in NS syringe   IntraVENous PRN    fentaNYL citrate (PF) injection    PRN    lidocaine (PF) (XYLOCAINE) 20 mg/mL (2 %) injection   IntraVENous PRN    propofol (DIPRIVAN) 10 mg/mL injection   IntraVENous PRN    rocuronium (ZEMURON) injection   IntraVENous PRN    succinylcholine (ANECTINE) injection   IntraVENous PRN    ceFAZolin (ANCEF) 2g in 100 ml 0.9% NS IVPB   IntraVENous PRN    PHENYLephrine (NEOSYNEPHRINE) 10 mg in 0.9% sodium chloride 250 mL infusion  10 mg IntraVENous CONTINUOUS     ______________________________________________________________________  EXPECTED LENGTH OF STAY: 3d 2h  ACTUAL LENGTH OF STAY:          4                 Adalberto Veras MD

## 2017-04-05 NOTE — PROGRESS NOTES
Day #1 of Cefepime (changed from ceftriaxone secondary to culture results)  Indication:  Pseudomonas UTI  Current regimen:  1g IV [id number]  ID Following ?: NO  Frequency of BMP?: Daily  Recent Labs      17   0450  17   0343   CREA  1.60*  1.81*   BUN  42*  45*     Est CrCl: ~35 ml/min  Temp (24hrs), Av.1 °F (36.7 °C), Min:97.3 °F (36.3 °C), Max:99 °F (37.2 °C)    Cultures:   / Urine:  Pseudomonas (sensitive to Cefepime)    Plan: Change to 2g IV [id number] secondary to current renal function

## 2017-04-05 NOTE — PERIOP NOTES
Patient: Giovanna Biswas MRN: [id number]  SSN: [id number]   YOB: 1935  Age: [de-identified] y.o. Sex: male     Patient is status post Procedure(s):  IM NAIL LEFT HIP.     Surgeon(s) and Role:     * Vitaly Labs, MD - Primary    Local/Dose/Irrigation:  See [place]                  Peripheral IV 04/01/17 Left Forearm (Active)   Site Assessment Clean, dry, & intact 4/5/2017  4:29 PM   Phlebitis Assessment 0 4/5/2017  8:57 AM   Infiltration Assessment 0 4/5/2017  8:57 AM   Dressing Status Clean, dry, & intact 4/5/2017  8:57 AM   Dressing Type Transparent 4/5/2017  8:57 AM   Hub Color/Line Status Pink;Capped 4/5/2017  8:57 AM   Action Taken Open ports on tubing capped 4/4/2017  4:16 PM   Alcohol Cap Used Yes 4/5/2017  8:57 AM            Airway - Endotracheal Tube 04/05/17 Oral (Active)                   Dressing/Packing:  Wound Scrotum-DRESSING TYPE: Open to air (04/05/17 1826)  Wound Sacral/coccyx-DRESSING TYPE: Open to air (04/05/17 1826)  Wound Hip Left-DRESSING TYPE: ABD pad;Xeroform;Special tape (comment);4 x 4 (04/05/17 1826)

## 2017-04-06 LAB
ANION GAP BLD CALC-SCNC: 10 MMOL/L (ref 5–15)
ANION GAP BLD CALC-SCNC: 9 MMOL/L (ref 5–15)
BUN SERPL-MCNC: 42 MG/DL (ref 6–20)
BUN SERPL-MCNC: 44 MG/DL (ref 6–20)
BUN/CREAT SERPL: 25 (ref 12–20)
BUN/CREAT SERPL: 28 (ref 12–20)
CALCIUM SERPL-MCNC: 7.3 MG/DL (ref 8.5–10.1)
CALCIUM SERPL-MCNC: 7.6 MG/DL (ref 8.5–10.1)
CHLORIDE SERPL-SCNC: 112 MMOL/L (ref 97–108)
CHLORIDE SERPL-SCNC: 113 MMOL/L (ref 97–108)
CO2 SERPL-SCNC: 20 MMOL/L (ref 21–32)
CO2 SERPL-SCNC: 24 MMOL/L (ref 21–32)
CREAT SERPL-MCNC: 1.58 MG/DL (ref 0.7–1.3)
CREAT SERPL-MCNC: 1.65 MG/DL (ref 0.7–1.3)
GLUCOSE SERPL-MCNC: 128 MG/DL (ref 65–100)
GLUCOSE SERPL-MCNC: 154 MG/DL (ref 65–100)
HCT VFR BLD AUTO: 29.6 % (ref 36.6–50.3)
HGB BLD-MCNC: 9 G/DL (ref 12.1–17)
POTASSIUM SERPL-SCNC: 4.6 MMOL/L (ref 3.5–5.1)
POTASSIUM SERPL-SCNC: 5.6 MMOL/L (ref 3.5–5.1)
SODIUM SERPL-SCNC: 141 MMOL/L (ref 136–145)
SODIUM SERPL-SCNC: 147 MMOL/L (ref 136–145)

## 2017-04-06 PROCEDURE — 74011250637 HC RX REV CODE- 250/637: Performed by: ORTHOPAEDIC SURGERY

## 2017-04-06 PROCEDURE — 80048 BASIC METABOLIC PNL TOTAL CA: CPT | Performed by: ORTHOPAEDIC SURGERY

## 2017-04-06 PROCEDURE — 77010033678 HC OXYGEN DAILY

## 2017-04-06 PROCEDURE — 97164 PT RE-EVAL EST PLAN CARE: CPT

## 2017-04-06 PROCEDURE — 65270000029 HC RM PRIVATE

## 2017-04-06 PROCEDURE — 97530 THERAPEUTIC ACTIVITIES: CPT

## 2017-04-06 PROCEDURE — 74011250636 HC RX REV CODE- 250/636: Performed by: ORTHOPAEDIC SURGERY

## 2017-04-06 PROCEDURE — 74011250637 HC RX REV CODE- 250/637: Performed by: HOSPITALIST

## 2017-04-06 PROCEDURE — 74011250637 HC RX REV CODE- 250/637: Performed by: INTERNAL MEDICINE

## 2017-04-06 PROCEDURE — 74011000258 HC RX REV CODE- 258: Performed by: HOSPITALIST

## 2017-04-06 PROCEDURE — 74011250636 HC RX REV CODE- 250/636: Performed by: HOSPITALIST

## 2017-04-06 PROCEDURE — G8988 SELF CARE GOAL STATUS: HCPCS

## 2017-04-06 PROCEDURE — 85018 HEMOGLOBIN: CPT | Performed by: ORTHOPAEDIC SURGERY

## 2017-04-06 PROCEDURE — 51798 US URINE CAPACITY MEASURE: CPT

## 2017-04-06 PROCEDURE — 77030011256 HC DRSG MEPILEX <16IN NO BORD MOLN -A

## 2017-04-06 PROCEDURE — 97165 OT EVAL LOW COMPLEX 30 MIN: CPT

## 2017-04-06 PROCEDURE — G8987 SELF CARE CURRENT STATUS: HCPCS

## 2017-04-06 PROCEDURE — 36415 COLL VENOUS BLD VENIPUNCTURE: CPT | Performed by: ORTHOPAEDIC SURGERY

## 2017-04-06 RX ORDER — ACETAMINOPHEN 325 MG/1
650 TABLET ORAL EVERY 6 HOURS
Status: DISCONTINUED | OUTPATIENT
Start: 2017-04-06 | End: 2017-04-07 | Stop reason: HOSPADM

## 2017-04-06 RX ORDER — OXYCODONE HYDROCHLORIDE 5 MG/1
5 TABLET ORAL
Status: DISCONTINUED | OUTPATIENT
Start: 2017-04-06 | End: 2017-04-07 | Stop reason: HOSPADM

## 2017-04-06 RX ORDER — SODIUM POLYSTYRENE SULFONATE 15 G/60ML
15 SUSPENSION ORAL; RECTAL
Status: COMPLETED | OUTPATIENT
Start: 2017-04-06 | End: 2017-04-06

## 2017-04-06 RX ADMIN — PANTOPRAZOLE SODIUM 40 MG: 40 TABLET, DELAYED RELEASE ORAL at 06:37

## 2017-04-06 RX ADMIN — CALCIUM CARBONATE-VITAMIN D TAB 500 MG-200 UNIT 1 TABLET: 500-200 TAB at 11:07

## 2017-04-06 RX ADMIN — CALCIUM CARBONATE-VITAMIN D TAB 500 MG-200 UNIT 1 TABLET: 500-200 TAB at 17:14

## 2017-04-06 RX ADMIN — CASTOR OIL AND BALSAM, PERU: 788; 87 OINTMENT TOPICAL at 06:37

## 2017-04-06 RX ADMIN — ENOXAPARIN SODIUM 40 MG: 40 INJECTION SUBCUTANEOUS at 08:48

## 2017-04-06 RX ADMIN — CALCIUM CARBONATE-VITAMIN D TAB 500 MG-200 UNIT 1 TABLET: 500-200 TAB at 08:49

## 2017-04-06 RX ADMIN — PANTOPRAZOLE SODIUM 40 MG: 40 TABLET, DELAYED RELEASE ORAL at 17:14

## 2017-04-06 RX ADMIN — CLOBETASOL PROPIONATE: 0.5 CREAM TOPICAL at 09:00

## 2017-04-06 RX ADMIN — FLUTICASONE PROPIONATE 2 SPRAY: 50 SPRAY, METERED NASAL at 09:00

## 2017-04-06 RX ADMIN — FERROUS SULFATE TAB 325 MG (65 MG ELEMENTAL FE) 325 MG: 325 (65 FE) TAB at 08:50

## 2017-04-06 RX ADMIN — CEFEPIME 2 G: 2 INJECTION, POWDER, FOR SOLUTION INTRAVENOUS at 22:27

## 2017-04-06 RX ADMIN — CASTOR OIL AND BALSAM, PERU: 788; 87 OINTMENT TOPICAL at 09:00

## 2017-04-06 RX ADMIN — DOCUSATE SODIUM AND SENNOSIDES 1 TABLET: 8.6; 5 TABLET, FILM COATED ORAL at 17:14

## 2017-04-06 RX ADMIN — CLOBETASOL PROPIONATE: 0.5 CREAM TOPICAL at 18:00

## 2017-04-06 RX ADMIN — ACETAMINOPHEN 650 MG: 325 TABLET, FILM COATED ORAL at 23:43

## 2017-04-06 RX ADMIN — POLYETHYLENE GLYCOL 3350 17 G: 17 POWDER, FOR SOLUTION ORAL at 08:48

## 2017-04-06 RX ADMIN — DOCUSATE SODIUM 100 MG: 100 CAPSULE, LIQUID FILLED ORAL at 08:49

## 2017-04-06 RX ADMIN — ACETAMINOPHEN 650 MG: 325 TABLET, FILM COATED ORAL at 17:14

## 2017-04-06 RX ADMIN — SODIUM POLYSTYRENE SULFONATE 15 G: 15 SUSPENSION ORAL; RECTAL at 08:48

## 2017-04-06 RX ADMIN — FERROUS SULFATE TAB 325 MG (65 MG ELEMENTAL FE) 325 MG: 325 (65 FE) TAB at 17:14

## 2017-04-06 RX ADMIN — CASTOR OIL AND BALSAM, PERU: 788; 87 OINTMENT TOPICAL at 21:31

## 2017-04-06 RX ADMIN — DOCUSATE SODIUM AND SENNOSIDES 1 TABLET: 8.6; 5 TABLET, FILM COATED ORAL at 08:49

## 2017-04-06 RX ADMIN — ROSUVASTATIN CALCIUM 20 MG: 10 TABLET, FILM COATED ORAL at 21:30

## 2017-04-06 RX ADMIN — METOPROLOL TARTRATE 12.5 MG: 25 TABLET ORAL at 08:49

## 2017-04-06 RX ADMIN — ACETAMINOPHEN 650 MG: 325 TABLET, FILM COATED ORAL at 11:07

## 2017-04-06 RX ADMIN — ACETAMINOPHEN 650 MG: 325 TABLET, FILM COATED ORAL at 06:37

## 2017-04-06 RX ADMIN — Medication 10 ML: at 21:30

## 2017-04-06 NOTE — WOUND CARE
WOCN Note:     Follow-up visit for sacrum and gluteal cleft. Chart shows:  Admitted for leg pain UTI; history of CKD, falls, rhabdomyolysis, lymphedema, right knee replacement 2014  Admitted from assisted living.      Assessment:   Patient is communicative but with inconsistent answers and requires full assists in repositioning. Bed: total care  Patient has a Busby. Patient no pain.     Bilateral heel skin intact and without erythema.     Mixed tissues over gluteal cleft, sacrum, and buttocks. There are rings around anus, buttocks and on posterior thigh suggestive of BSC or toilet seat trauma now much lighter in color and remain intact. Sacrum and gluteal cleft with MASD and scattered partial thickness tissue loss, fully blanching pink wit some maceration. Top of gluteal cleft with non-blanching purple now resolved/fully blanching pink. Venelex in use.      Heels offloaded on pillow. Recommendations:    Continue Venelex as ordered. Skin Care & Pressure Prevention:  Minimize layers of linen/pads under patient to optimize support surface. Turn/reposition approximately every 2 hours and offload heels. Discussed above plan with patient & RN, Hector Rosenberg.     Transition of Care: Plan to follow weekly and as needed while admitted to hospital.    ARACELI GutierrezN, RN, [facility]  Certified Wound, Ostomy, Continence Nurse  office [phone]  pager [phone] or call  to page

## 2017-04-06 NOTE — ROUTINE PROCESS
TRANSFER - OUT REPORT:    Verbal report given to Anjelica Pisano RN/CRM on Deepika Delcid  being transferred to [facility] for urgent transfer       Report consisted of patients Situation, Background, Assessment and Recommendations(SBAR). Information from the following report(s) SBAR was reviewed with the receiving nurse. Opportunity for questions and clarification was provided.

## 2017-04-06 NOTE — PROGRESS NOTES
Problem: Self Care Deficits Care Plan (Adult)  Goal: *Acute Goals and Plan of Care (Insert Text)  Occupational Therapy Goals  Initiated: 4/6/2017    1. Patient will perform grooming with supervision/set-up sitting in chair within 7 day(s). 2. Patient will perform bathing with mod A from chair within 7 day(s). 3. Patient will perform upper body dressing and lower body dressing with mod A within 7 day(s). 4. Patient will perform toilet transfers with mod A within 7 day(s). 5. Patient will perform all aspects of toileting with min A within 7 day(s). OCCUPATIONAL THERAPY EVALUATION  Patient: Seda Rosado (76 y.o. male)  Date: 4/6/2017  Primary Diagnosis: Acute kidney injury ([facility] [address]  Urinary tract infection  UNKNOWN  Procedure(s) (LRB):  IM NAIL LEFT HIP (Left) 1 Day Post-Op   Precautions:   Fall, WBAT      ASSESSMENT :  Based on the objective data described below, the patient presents with decreased independence with self care and functional mobility at this time following admission for left femur fracture. Pt is POD 1 from IM nailing. Pt participated with PT and able to sit EOB but declined sitting EOB again with OT when offered. Pt discussed baseline function and he and his wife has a care aide who assists with dressing, bathing, toileting, and self feeding. Pt appears to fluctuate performance at home as he reports at times he completes tasks independently but at other times she assists him with tasks. Pt does report toileting is mostly independent using walker and wheelchair. Pt reports he uses both consistently for mobility. Recommend discharge to rehab setting to maximize his level of independence so he can hopefully return home. Patient will benefit from skilled intervention to address the above impairments.   Patients rehabilitation potential is considered to be Good  Factors which may influence rehabilitation potential include:   [X]             None noted  [ ]             Mental ability/status  [ ]             Medical condition  [ ]             Home/family situation and support systems  [ ]             Safety awareness  [ ]             Pain tolerance/management  [ ]             Other:        PLAN :  Recommendations and Planned Interventions:  [X]               Self Care Training                  [X]        Therapeutic Activities  [X]               Functional Mobility Training    [ ]        Cognitive Retraining  [X]               Therapeutic Exercises           [X]        Endurance Activities  [X]               Balance Training                   [ ]        Neuromuscular Re-Education  [ ]               Visual/Perceptual Training     [X]   Home Safety Training  [X]               Patient Education                 [X]        Family Training/Education  [ ]               Other (comment):     Frequency/Duration: Patient will be followed by occupational therapy 3 times a week to address goals. Discharge Recommendations: Rehab  Further Equipment Recommendations for Discharge: TBD       SUBJECTIVE:   Patient stated I am feeling very tired.       OBJECTIVE DATA SUMMARY:   HISTORY:   Past Medical History:   Diagnosis Date    Arrhythmia       RAPID HEARTBEAT    Arthritis      Constipation      Hearing loss      Heart disease      Hypercholesteremia      Hypertension      Joint pain      Memory disorder       Past Surgical History:   Procedure Laterality Date    HX BACK SURGERY   1974    HX CATARACT REMOVAL         LEFT    HX COLONOSCOPY        HX HERNIA REPAIR   1975     double    HX TONSILLECTOMY        UPPER GI ENDOSCOPY,BIOPSY   3/22/2013              Prior Level of Function/Home Situation: pt reports he has a care aide at home. She assists with getting OOB every morning and breakfast and lunch. She assists him with dressing as needed and bathing as needed. He reports he was able to toilet at mod I level at home but denies ability to pull up his own pants.   Will need to continue to address PLOF with continued treatment. Expanded or extensive additional review of patient history:      Home Situation  [address] Name: Corbin Hurt  One/Two Story Residence: One story  Living Alone: No  Support Systems: Assisted living  Patient Expects to be Discharged to[de-identified] Assisted living  Current DME Used/Available at Home: Wheelchair, Walker, rolling, Commode, bedside, Shower chair  Tub or Shower Type: Shower  [X]  Right hand dominant             [ ]  Left hand dominant     EXAMINATION OF PERFORMANCE DEFICITS:  Cognitive/Behavioral Status:  Neurologic State: Alert  Orientation Level: Oriented X4  Cognition: Appropriate for age attention/concentration  Perception: Appears intact  Perseveration: No perseveration noted  Safety/Judgement: Good awareness of safety precautions     Skin: see nursing notes     Edema: none noted     Hearing: Auditory  Auditory Impairment: None     Vision/Perceptual:                           Acuity: Within Defined Limits          Range of Motion:     AROM: Generally decreased, functional  PROM: Generally decreased, functional                       Strength:     Strength: Generally decreased, functional                 Coordination:  Coordination: Generally decreased, functional  Fine Motor Skills-Upper: Right Intact; Left Intact    Gross Motor Skills-Upper: Right Intact; Left Intact     Tone & Sensation:     Tone: Normal  Sensation: Intact                       Balance:  Sitting:  (declined OOB with OT this pm)     Functional Mobility and Transfers for ADLs:  Bed Mobility:  Supine to Sit:  (declined OOB with OT this pm)  Sit to Supine:  (declined OOB with OT)     Transfers:  Sit to Stand:  (declined OOB with OT)  Stand to Sit:  (declined OOB with OT)  Bed to Chair:  (declined OOB with OT)  Toilet Transfer :  (declined OOB with OT)     ADL Assessment:  Feeding: Minimum assistance     Oral Facial Hygiene/Grooming: Minimum assistance     Bathing: Total assistance     Upper Body Dressing: Total assistance     Lower Body Dressing: Total assistance     Toileting: Total assistance (lorenzo in place)                 ADL Intervention and task modifications:   Pt refused OOB with OT for functional activity. Pt completed simple grooming sitting up in bed only. Cognitive Retraining  Safety/Judgement: Good awareness of safety precautions     Functional Measure:  Barthel Index:      Bathin  Bladder: 0  Bowels: 10  Groomin  Dressin  Feedin  Mobility: 0  Stairs: 0  Toilet Use: 0  Transfer (Bed to Chair and Back): 5  Total: 20         Barthel and G-code impairment scale:  Percentage of impairment CH  0% CI  1-19% CJ  20-39% CK  40-59% CL  60-79% CM  80-99% CN  100%   Barthel Score 0-100 100 99-80 79-60 59-40 20-39 1-19    0   Barthel Score 0-20 20 17-19 13-16 9-12 5-8 1-4 0      The Barthel ADL Index: Guidelines  1. The index should be used as a record of what a patient does, not as a record of what a patient could do. 2. The main aim is to establish degree of independence from any help, physical or verbal, however minor and for whatever reason. 3. The need for supervision renders the patient not independent. 4. A patient's performance should be established using the best available evidence. Asking the patient, friends/relatives and nurses are the usual sources, but direct observation and common sense are also important. However direct testing is not needed. 5. Usually the patient's performance over the preceding 24-48 hours is important, but occasionally longer periods will be relevant. 6. Middle categories imply that the patient supplies over 50 per cent of the effort. 7. Use of aids to be independent is allowed. Katlyn Pearson., Barthel, D.W. ([id number]). Functional evaluation: the Barthel Index. [address] [facility] (14)2. Roman Grade migel [place], J.J.M.F, Nirav Maldonado., Reji Stoner., Arya See, [address] ().  Measuring the change indisability after inpatient rehabilitation; comparison of the responsiveness of the Barthel Index and Functional [place] Measure. Journal of Neurology, Neurosurgery, and Psychiatry, 66(4), 861-151. TERE Cardenas.A, CIPRIANO Dougherty, & Vinnie Rothman M.A. (2004.) Assessment of post-stroke quality of life in cost-effectiveness studies: The usefulness of the Barthel Index and the EuroQoL-5D. Quality of Life Research, 13, 122-23      G codes: In compliance with CMSs Claims Based Outcome Reporting, the following G-code set was chosen for this patient based on their primary functional limitation being treated: The outcome measure chosen to determine the severity of the functional limitation was the Barthel Index with a score of 20/100 which was correlated with the impairment scale. · Self Care:               - CURRENT STATUS:    CL - 60%-79% impaired, limited or restricted               - GOAL STATUS:           CJ - 20%-39% impaired, limited or restricted               - D/C STATUS:                       ---------------To be determined---------------      Occupational Therapy Evaluation Charge Determination   History Examination Decision-Making   LOW Complexity : Brief history review  HIGH Complexity : 5 or more performance deficits relating to physical, cognitive , or psychosocial skils that result in activity limitations and / or participation restrictions MEDIUM Complexity : Patient may present with comorbidities that affect occupational performnce. Miniml to moderate modification of tasks or assistance (eg, physical or verbal ) with assesment(s) is necessary to enable patient to complete evaluation       Based on the above components, the patient evaluation is determined to be of the following complexity level: LOW   Pain:  Pain Scale 1: Numeric (0 - 10)  Pain Intensity 1: 3  Pain Location 1: Hip  Pain Orientation 1: Left  Pain Description 1: Aching     Activity Tolerance:   VSS throughout session. After treatment:   [X] Patient left in no apparent distress sitting up in chair  [ ] Patient left in no apparent distress in bed  [X] Call bell left within reach  [X] Nursing notified  [ ] Caregiver present  [ ] Bed alarm activated      COMMUNICATION/EDUCATION:   The patients plan of care was discussed with: Physical Therapist and Registered Nurse.  [X] Home safety education was provided and the patient/caregiver indicated understanding. [X] Patient/family have participated as able in goal setting and plan of care. [ ] Patient/family agree to work toward stated goals and plan of care. [ ] Patient understands intent and goals of therapy, but is neutral about his/her participation. [ ] Patient is unable to participate in goal setting and plan of care. This patients plan of care is appropriate for delegation to [facility].      Thank you for this referral.  Traci Mcphersonelor, OT  Time Calculation: 15 mins

## 2017-04-06 NOTE — PROGRESS NOTES
Problem: Mobility Impaired (Adult and Pediatric)  Goal: *Acute Goals and Plan of Care (Insert Text)  Physical Therapy Goals  Initiated 4/3/2017  1. Patient will move from supine to sit and sit to supine , scoot up and down and roll side to side in bed with minimal assistance/contact guard assist within 7 day(s). 2. Patient will transfer from bed to chair and chair to bed with minimal assistance/contact guard assist using the least restrictive device within 7 day(s). 3. Patient will perform sit to stand with minimal assistance/contact guard assist within 7 day(s). PHYSICAL THERAPY REEVALUATION  Patient: Karli Mruray (89 y.o. male)  Date: 4/6/2017  Primary Diagnosis: Acute kidney injury ([facility] [address]  Urinary tract infection  UNKNOWN  Procedure(s) (LRB):  IM NAIL LEFT HIP (Left) 1 Day Post-Op   Precautions:   Fall, WBAT      ASSESSMENT :  Based on the objective data described below, the patient presents with L femur fracture now s/p IM nail POD1. Patient had been seen earlier in admission before fracture was discovered. Today, he was able to come to the EOB but with max assist and needed assist of 2 to return to supine. He is very cooperative but in considerable pain and quite weak overall. Although according to the chart, he is wheelchair bound, he reports that he was able to transfer to and from the chair with his RW without assistance and did walk in the apartment some without assistance. Given his overall level of strength and mobility, recommend SNF level rehab when medically stable. We will attempt to increase to BID treatments, per protocol for hip fx. .     Patient will benefit from skilled intervention to address the above impairments.   Patients rehabilitation potential is considered to be Fair  Factors which may influence rehabilitation potential include:   [ ]           None noted  [ ]           Mental ability/status  [X]           Medical condition  [X]           Home/family situation and support systems  [ ]           Safety awareness  [ ]           Pain tolerance/management  [ ]           Other:        PLAN :  Recommendations and Planned Interventions:  [X]             Bed Mobility Training             [ ]      Neuromuscular Re-Education  [X]             Transfer Training                   [ ]      Orthotic/Prosthetic Training  [X]             Gait Training                         [ ]      Modalities  [X]             Therapeutic Exercises           [ ]      Edema Management/Control  [X]             Therapeutic Activities            [X]      Patient and Family Training/Education  [ ]             Other (comment):  Frequency/Duration: Patient will be followed by physical therapy twice daily to address goals. Discharge Recommendations: Skilled Nursing Facility  Further Equipment Recommendations for Discharge: none at this time       SUBJECTIVE:   Patient stated Dorlaverne Mandie will try, I don't know how much I will be able to do.       OBJECTIVE DATA SUMMARY:       Past Medical History:   Diagnosis Date    Arrhythmia       RAPID HEARTBEAT    Arthritis      Constipation      Hearing loss      Heart disease      Hypercholesteremia      Hypertension      Joint pain      Memory disorder       Past Surgical History:   Procedure Laterality Date    HX BACK SURGERY   1974    HX CATARACT REMOVAL         LEFT    HX COLONOSCOPY        HX HERNIA REPAIR   1975     double    HX TONSILLECTOMY        UPPER GI ENDOSCOPY,BIOPSY   3/22/2013           Hospital course since last seen and reason for reevaluation: IM nailing of L femur fx  Critical Behavior:  Neurologic State: Alert  Orientation Level: Oriented X4  Cognition: Appropriate for age attention/concentration  Safety/Judgement: Good awareness of safety precautions  Skin:  Dressing L lateral hip is C/D/I, skin is generally thin and friable  Strength:    Strength: Generally decreased, functional                       Tone & Sensation:   Tone: Normal Sensation: Intact               Range Of Motion:  AROM: Generally decreased, functional           PROM: Generally decreased, functional           Coordination:  Coordination: Generally decreased, functional     Functional Mobility:  Bed Mobility:     Supine to Sit: Maximum assistance;Assist x1 (sat with PT, but declined OOB with OT this pm)  Sit to Supine: Maximum assistance;Assist x2 (sat with PT, but declined OOB with OT)     Transfers:  Sit to Stand:  (declined OOB with OT)  Stand to Sit:  (declined OOB with OT)        Bed to Chair:  (declined OOB with OT)           Balance:   Sitting: Impaired; Without support (sat up with PT earlier in PM)  Sitting - Static: Fair (occasional)  Sitting - Dynamic: Poor (constant support)  Standing:  (patient unable to stand at this time)  Ambulation/Gait Training:                                                                   Functional Measure:  Barthel Index:      Bathin  Bladder: 0  Bowels: 10  Groomin  Dressin  Feedin  Mobility: 0  Stairs: 0  Toilet Use: 0  Transfer (Bed to Chair and Back): 5  Total: 20         Barthel and G-code impairment scale:  Percentage of impairment CH  0% CI  1-19% CJ  20-39% CK  40-59% CL  60-79% CM  80-99% CN  100%   Barthel Score 0-100 100 99-80 79-60 59-40 20-39 1-19    0   Barthel Score 0-20 20 17-19 13-16 9-12 5-8 1-4 0      The Barthel ADL Index: Guidelines  1. The index should be used as a record of what a patient does, not as a record of what a patient could do. 2. The main aim is to establish degree of independence from any help, physical or verbal, however minor and for whatever reason. 3. The need for supervision renders the patient not independent. 4. A patient's performance should be established using the best available evidence. Asking the patient, friends/relatives and nurses are the usual sources, but direct observation and common sense are also important. However direct testing is not needed.   5. Usually the patient's performance over the preceding 24-48 hours is important, but occasionally longer periods will be relevant. 6. Middle categories imply that the patient supplies over 50 per cent of the effort. 7. Use of aids to be independent is allowed. Caresse Ide., Barthel, D.W. ([id number]). Functional evaluation: the Barthel Index. [address] [facility] (14)2. Trudy Curly migel [place], J.J.M.F, Ronnell Kumar, Jose M CuevasRehoboth McKinley Christian [facility] Services.[facility], [address] [place] [address] Ave (1999). Measuring the change indisability after inpatient rehabilitation; comparison of the responsiveness of the Barthel Index and Functional [place] Measure. Journal of Neurology, Neurosurgery, and Psychiatry, 66(4), 526-267. JOSELUIS Urbina, CIPRIANO Dougherty, & Martine Solis M.A. (2004.) Assessment of post-stroke quality of life in cost-effectiveness studies: The usefulness of the Barthel Index and the EuroQoL-5D. Quality of Life Research, 13, 328-02         G codes: In compliance with CMSs Claims Based Outcome Reporting, the following G-code set was chosen for this patient based on their primary functional limitation being treated: The outcome measure chosen to determine the severity of the functional limitation was the Barthel with a score of 20/100 which was correlated with the impairment scale. · Mobility - Walking and Moving Around:               - CURRENT STATUS:    CL - 60%-79% impaired, limited or restricted               - GOAL STATUS:           CJ - 20%-39% impaired, limited or restricted               - D/C STATUS:                       ---------------To be determined---------------      Pain:  Pain Scale 1: Numeric (0 - 10)  Pain Intensity 1: 3  Pain Location 1: Hip  Pain Orientation 1: Left  Pain Description 1: Aching     Activity Tolerance:   Limited by general fatigue, but VSS while sitting EOB  Please refer to the flowsheet for vital signs taken during this treatment.   After treatment:   [ ]  Patient left in no apparent distress sitting up in chair  [X]  Patient left in no apparent distress in bed  [X]  Call bell left within reach  [X]  Nursing notified  [ ]  Caregiver present  [ ]  Bed alarm activated      COMMUNICATION/EDUCATION:   The patients plan of care was discussed with: Occupational Therapist, Registered Nurse and . [X]  Fall prevention education was provided and the patient/caregiver indicated understanding. [X]  Patient/family have participated as able in goal setting and plan of care. [X]  Patient/family agree to work toward stated goals and plan of care. [ ]  Patient understands intent and goals of therapy, but is neutral about his/her participation. [ ]  Patient is unable to participate in goal setting and plan of care.      Thank you for this referral.  Elvi Jovel, PT   Time Calculation: 40 mins

## 2017-04-06 NOTE — ANESTHESIA POSTPROCEDURE EVALUATION
Post-Anesthesia Evaluation and Assessment    Patient: Uriah Molina MRN: [id number]  SSN: [id number]    YOB: 1935  Age: [de-identified] y.o. Sex: male       Cardiovascular Function/Vital Signs  Visit Vitals    /43    Pulse 76    Temp 36.5 °C (97.7 °F)    Resp 14    Ht 5' 7\" (1.702 m)    Wt 68 kg (150 lb)    SpO2 97%    BMI 23.49 kg/m2       Patient is status post spinal anesthesia for Procedure(s):  IM NAIL LEFT HIP. Nausea/Vomiting: None    Postoperative hydration reviewed and adequate. Pain:  Pain Scale 1: Numeric (0 - 10) (04/05/17 2000)  Pain Intensity 1: 4 (04/05/17 2000)   Managed    Neurological Status:   Neuro (WDL): Within Defined Limits (04/05/17 2000)  Neuro  Neurologic State: Alert; Appropriate for age (04/05/17 2000)  Orientation Level: Oriented X4 (04/05/17 2000)  Cognition: Appropriate decision making; Appropriate for age attention/concentration; Appropriate safety awareness; Follows commands (04/05/17 2000)  Speech: Clear (04/05/17 2000)   At baseline    Mental Status and Level of Consciousness: Arousable    Pulmonary Status:   O2 Device: Nasal cannula (04/05/17 2000)   Adequate oxygenation and airway patent    Complications related to anesthesia: None    Post-anesthesia assessment completed.  No concerns    Signed By: Georgiana Wright MD     April 5, 2017

## 2017-04-06 NOTE — PERIOP NOTES
Patient cleared for transfer to floor. Awake, alert, pain controlled, no nausea. No bed is available, surgical waiting area notified of patient and room status.

## 2017-04-06 NOTE — PERIOP NOTES
VS stable. Awake and alert. Resting comfortably. Patient denies nausea. Pain improved. No signs of excessive bleeding. Tolerating ice chips without difficulty. Ready to transfer from PACU.

## 2017-04-06 NOTE — PROGRESS NOTES
Bedside and Verbal shift change report given to [address] (oncoming nurse) by Gali Bethea (offgoing nurse). Report included the following information SBAR and Kardex.

## 2017-04-06 NOTE — PROGRESS NOTES
TRANSFER - IN REPORT:    Verbal report received from Glen Rios RN(name) on Peewee Cazares  being received from [facility]) for routine post - op      Report consisted of patients Situation, Background, Assessment and   Recommendations(SBAR). Information from the following report(s) SBAR, Kardex, OR Summary, Intake/Output and MAR was reviewed with the receiving nurse. Opportunity for questions and clarification was provided. Assessment completed upon patients arrival to unit and care assumed.      Primary Nurse Leonor Wen RN and Juma Gonzales RN performed a dual skin assessment on this patient Impairment noted- see wound doc flow sheet  Waldo score is 12

## 2017-04-06 NOTE — PROGRESS NOTES
No new complaints overnight. GEN:  NAD.  AOx3   ABD:  S/NT/ND   LLE:  Dressing C/D/I , 5/5 motor, Calf nttp (Bilat), Sensation rossly intact to light touch throughout, 1+ dp/pt pulses, foot perfused    Patient Vitals for the past 24 hrs:   Temp Pulse Resp BP SpO2   04/06/17 0753 97.7 °F (36.5 °C) 88 16 116/59 98 %   04/06/17 0340 97.7 °F (36.5 °C) 82 16 115/54 98 %   04/06/17 0122 97.5 °F (36.4 °C) 91 16 120/63 94 %   04/06/17 0025 97.5 °F (36.4 °C) 88 14 125/60 95 %   04/05/17 2329 97.7 °F (36.5 °C) 82 14 148/64 95 %   04/05/17 2217 97.7 °F (36.5 °C) 79 14 142/62 95 %   04/05/17 2100 - 79 12 123/44 91 %   04/05/17 2045 - 83 19 116/46 92 %   04/05/17 2030 - 75 12 118/44 95 %   04/05/17 2000 97.7 °F (36.5 °C) 76 14 112/43 97 %   04/05/17 1945 - 74 21 119/50 98 %   04/05/17 1930 - 83 23 125/52 97 %   04/05/17 1915 - 82 21 129/56 98 %   04/05/17 1910 97.9 °F (36.6 °C) 77 16 123/58 99 %   04/05/17 1905 - 90 20 (!) 132/108 97 %   04/05/17 1900 - 96 (!) 31 137/67 97 %   04/05/17 1859 97.9 °F (36.6 °C) 96 24 137/67 97 %   04/05/17 1603 97.9 °F (36.6 °C) 84 18 134/70 94 %   04/05/17 0857 97.3 °F (36.3 °C) 84 18 123/69 93 %       Current Facility-Administered Medications   Medication Dose Route Frequency    sodium polystyrene (KAYEXALATE) 15 gram/60 mL oral suspension 15 g  15 g Oral NOW    cefepime (MAXIPIME) 2 g in 0.9% sodium chloride (MBP/ADV) 100 mL  2 g IntraVENous Q24H    zinc oxide 20 % ointment   Topical DIALYSIS PRN    clobetasol (TEMOVATE) 0.05 % cream   Topical BID    0.9% sodium chloride infusion  125 mL/hr IntraVENous CONTINUOUS    sodium chloride (NS) flush 5-10 mL  5-10 mL IntraVENous Q8H    sodium chloride (NS) flush 5-10 mL  5-10 mL IntraVENous PRN    acetaminophen (TYLENOL) tablet 650 mg  650 mg Oral Q6H    traMADol (ULTRAM) tablet 50 mg  50 mg Oral Q6H PRN    hydrOXYzine HCl (ATARAX) tablet 10 mg  10 mg Oral Q8H PRN    naloxone (NARCAN) injection 0.4 mg  0.4 mg IntraVENous PRN    ondansetron (ZOFRAN ODT) tablet 4 mg  4 mg Oral Q4H PRN    ondansetron (ZOFRAN) injection 4 mg  4 mg IntraVENous Q4H PRN    calcium-vitamin D (OS-PRINCESS) 500 mg-200 unit tablet  1 Tab Oral TID WITH MEALS    senna-docusate (PERICOLACE) 8.6-50 mg per tablet 1 Tab  1 Tab Oral BID    polyethylene glycol (MIRALAX) packet 17 g  17 g Oral DAILY    [START ON 4/7/2017] bisacodyl (DULCOLAX) suppository 10 mg  10 mg Rectal DAILY PRN    enoxaparin (LOVENOX) injection 40 mg  40 mg SubCUTAneous DAILY    balsam peru-castor oil (VENELEX)  mg/gram ointment   Topical BID    sodium chloride (NS) flush 5-10 mL  5-10 mL IntraVENous Q8H    sodium chloride (NS) flush 5-10 mL  5-10 mL IntraVENous PRN    acetaminophen (TYLENOL) tablet 650 mg  650 mg Oral Q8H PRN    docusate sodium (COLACE) capsule 100 mg  100 mg Oral DAILY    fluticasone (FLONASE) 50 mcg/actuation nasal spray 2 Spray  2 Spray Both Nostrils DAILY    ferrous sulfate tablet 325 mg  325 mg Oral BID WITH MEALS    metoprolol tartrate (LOPRESSOR) tablet 12.5 mg  12.5 mg Oral BID    pantoprazole (PROTONIX) tablet 40 mg  40 mg Oral ACB&D    rosuvastatin (CRESTOR) tablet 20 mg  20 mg Oral QHS    zolpidem (AMBIEN) tablet 5 mg  5 mg Oral QHS       Lab Results   Component Value Date/Time    HGB 9.0 04/06/2017 03:48 AM    INR 1.1 04/05/2017 04:50 AM       Lab Results   Component Value Date/Time    Sodium 141 04/06/2017 03:48 AM    Potassium 5.6 04/06/2017 03:48 AM    Chloride 112 04/06/2017 03:48 AM    CO2 20 04/06/2017 03:48 AM    BUN 44 04/06/2017 03:48 AM    Creatinine 1.58 04/06/2017 03:48 AM    Calcium 7.6 04/06/2017 03:48 AM    Magnesium 2.1 11/08/2016 03:19 AM    Phosphorus 3.5 11/08/2016 03:19 AM            [de-identified] y.o. male s/p cephalomedullary nail left hip on 4/5/2017  . Wound care consult for sacral decub.      Precautions: None  ABX: Complete 24 hours perioperative abx  PATHWAY: D/C [facility] per protocol on POD 1  DVT Prophylaxis: Lovenox 40 sub Q for 21 days after discharge  Weight Bearing: WBAT LLE for transfer  Pain Control: Tylenol, tramadol, oxy  Disposition: SNF likely

## 2017-04-06 NOTE — CDMP QUERY
Please clarify if this patient is being treated/managed for:    =>Pathological left hip fracture (POA) in the setting of left femur fracture, noted osteoarthritis and wheelchair bound w/o knowledge of fall prior to admission requiring surgical intervention  =>Other Explanation of clinical findings  =>Unable to Determine (no explanation of clinical findings)    The medical record reflects the following:     Risk Factors: wheel chair bound  Clinical Indicators: : c/o leg pain junior, noted on CT left hip: \"Bones are osteopenic. Left femur intertrochanteric fracture is comminuted, impacted, with minimal healing. There is varus deformity. Severe left hip osteoarthritis\". Terell Basket Terell Basket Per op note, pt does not recall falling PTA. Treatment: Cephalomedullary nailing left hip with post op orders per ortho. Please clarify and document your clinical opinion in the progress notes and discharge summary including the definitive and/or presumptive diagnosis, (suspected or probable), related to the above clinical findings. Please include clinical findings supporting your diagnosis.     Thank you,    Dae Hendrickson RN, BSN, [facility], [address] [facility]  [phone]

## 2017-04-06 NOTE — PROGRESS NOTES
Verbal shift change report given to [place]  (oncoming nurse) by Kavin Crowley (offgoing nurse). Report included the following information SBAR and Kardex. Pt currently not on unit, report given to oncoming nurse that will receive pt post-op.

## 2017-04-06 NOTE — PROGRESS NOTES
NUTRITION COMPLETE ASSESSMENT    RECOMMENDATIONS:   1. Continue regular diet; Adjust PRN for low K+   2. RD add Nepro BID  3. RD update food preferences and attempt snacks  4. Staff set-up meal trays and assist/prompt to improve intake  5. Assure denture adehesive  6. Consider Nephrocap (renal MV) with poor appetite     Interventions/Plan:   Food/Nutrient Delivery:  Modify diet/texture/consistency/nutrients Commercial supplement (Nepro & trial Nutrigrain bar)        Nutrition Education:     Coordination of Care:    Nutrition Counseling:        Assessment:   Reason for Assessment:   [x] Provider Consult    Diet:  Regular  Supplements: RD added Nepro BID  Nutritionally Significant Medications: [x] Reviewed & Includes: FeSO4+, Oscal D, Miralax & Pericolace, Colace, Crestor   Meal Intake: Patient Vitals for the past 100 hrs:   % Diet Eaten   04/05/17 1629 0 %   04/05/17 1440 0 %   04/05/17 1303 0 %   04/05/17 0857 0 %   04/04/17 1014 50 %   04/03/17 1310 50 %   04/02/17 1800 25 %   04/02/17 1301 90 %   04/02/17 0940 50 %     Subjective:  UBW anywhere between 150-175#. Wear dentures chew ok. (RD observed some flapping of dental plates). Ate enough at breakfast, don't want anymore. RD observed possible 1-2 bites taken. Offered Nepro and accepted 100% and will try a snack. Objective:  Lives in [place], WC bound. L-hip fx s/p cephalomedullary nail (4/5/17); UTI Rx ABX. CKD-III; Noted (k+ Rx 5.6 (4/6/17) and Rx Kayexalate. Although admit wt WNL with BMI 23, pt with +1 LE edema and chronic lymphedema. Admit wt reflects ~25# loss over past two years and ? Fluid during this time. Appears fairly stable x past year ~150-115#, but ? actual dry weight. Pt with some orbital fat loss but difficult to assess fat/muscle loss with fluid. WC bound. Tolerates regular texture diet with full dentures and no hx dysphagia. Pt had full tray in front of him and can feed self however, appetite poor and per NSG 0-25%.   Multiple food preferences noted. No hx DM; . Per MD creat back to baseline, CKD-III. RD offered Nepro (425 kcal & 19 gm pro, 6.4 mEq K+) and accepted 100%. RD add BID = 850 kcal & 38 gm pro, 12.8 mEq K+. No recent B12, last checked 484 (1/23/17). Last BM 4/5/17, Rx Colace, Miralax, Pericolace. Rx Crestor for HL and renal risk CAD. Pt at nutrition risk with sacral skin area, geriatric hip fx s/p sx with poor appetite. Estimated Nutrition Needs:   Kcals/day: 1800 Kcals/day  Protein: 80 g (~1.2 gm/kg (watch RF and adjust PRN))  Fluid: 2000 ml (~30 mL/kg IBW (adjust PRN CKD))     Based On: Hortencia Wells (MSJ x 1.3)  Weight Used:  68 kg    Pt expected to meet estimated nutrient needs: [x]  No, not without ONS  Comparative Standards:  ;  ;      Nutrition Diagnosis:   1. Inadequate oral food/beverage intake related to poor appetite as evidenced by intake less than 0-25%    2.  Increased protein needs related to skin healing as evidenced by sacral decubitus per WOCN (more moisture related than pressure)    Goals:     Consume 100% minimum once day ONS in next 24 hr     Monitoring & Evaluation:    - Protein intake, Total energy intake, Liquid meal replacement   - Weight/weight change, Protein profile, Electrolyte and renal profile     Previous Nutrition Goals Met:  N/A  Previous Recommendations:      N/A    Education & Discharge Needs:   [x] None Identified   [x] Participated in care plan, discharge planning, and/or interdisciplinary rounds        Cultural, Spiritism and ethnic food preferences identified:    N/A    Skin Integrity: [x] sacral decubitus r/t moisture per WOCN  Edema: [x] +1 LE and chronic lymphedema  Last BM: 4/5/17 (Rx Miralax, Pericolace, Colace)  Food Allergies: [x]NKA    Anthropometrics:    Weight Loss Metrics 4/6/2017 11/7/2016 2/1/2015 1/23/2015 2/11/2014 1/23/2014 5/28/2013   Today's Wt 149 lb 14.6 oz 153 lb 153 lb 1 oz 155 lb 175 lb 175 lb 167 lb   BMI 23.48 kg/m2 23.26 kg/m2 23.28 kg/m2 23.57 kg/m2 28.26 kg/m2 28.26 kg/m2 24.65 kg/m2      Last 3 Recorded Weights in this Encounter    04/01/17 1326 04/06/17 0753   Weight: 68 kg (150 lb) 68 kg (149 lb 14.6 oz)      Weight Source: Patient stated  Height: 5' 7\" (170.2 cm),    Body mass index is 23.48 kg/(m^2). IBW : 67.1 kg (148 lb),    Usual Body Weight: 72.6 kg (160 lb) (Per pt 150-175#),      Labs:  Lab Results   Component Value Date/Time    Sodium 141 04/06/2017 03:48 AM    Potassium 5.6 04/06/2017 03:48 AM    Chloride 112 04/06/2017 03:48 AM    CO2 20 04/06/2017 03:48 AM    Glucose 154 04/06/2017 03:48 AM    BUN 44 04/06/2017 03:48 AM    Creatinine 1.58 04/06/2017 03:48 AM    Calcium 7.6 04/06/2017 03:48 AM    Magnesium 2.1 11/08/2016 03:19 AM    Phosphorus 3.5 11/08/2016 03:19 AM    Albumin 2.0 04/02/2017 02:13 AM     Lab Results   Component Value Date/Time    Hemoglobin A1c 5.6 01/23/2015 07:19 PM     Lab Results   Component Value Date/Time    Glucose 154 04/06/2017 03:48 AM    Glucose (POC) 118 01/27/2015 12:15 PM      Lab Results   Component Value Date/Time    ALT (SGPT) 15 04/02/2017 02:13 AM    AST (SGOT) 29 04/02/2017 02:13 AM    Alk.  phosphatase 108 04/02/2017 02:13 AM    Bilirubin, total 0.4 04/02/2017 02:13 AM        Neris Delgado, DENILSON

## 2017-04-06 NOTE — PERIOP NOTES
TRANSFER - OUT REPORT:    Verbal report given to [facility] MED STAR, RN(name) on Tomas Pierre  being transferred to [facility](unit) for routine post - op       Report consisted of patients Situation, Background, Assessment and   Recommendations(SBAR). Time Pre op antibiotic given:1726  Anesthesia Stop time: 1904    Information from the following report(s) SBAR, OR Summary, Intake/Output, MAR, Recent Results and Cardiac Rhythm SR, BBB, PVC's was reviewed with the receiving nurse. Opportunity for questions and clarification was provided. Is the patient on 02? YES       L/Min 2    Is the patient on a monitor? NO    Is the nurse transporting with the patient? NO    Surgical Waiting Area notified of patient's transfer from PACU? YES      The following personal items collected during your admission accompanied patient upon transfer:   Dental Appliance: Dental Appliances: Uppers, Lowers  Vision: Visual Aid: Glasses  Hearing Aid:    Jewelry: Jewelry: None  Clothing: Clothing: Other (comment) (clothing in patients room)  Other Valuables:  Other Valuables: None  Valuables sent to safe:

## 2017-04-06 NOTE — PROGRESS NOTES
Hospitalist Progress Note  Mykel Rodriguez MD  Office: [phone]        Date of Service:  2017  NAME:  Chandler Schirmer  :  1935  MRN:  [id number]      Admission Summary:     Patient is an [de-identified] white male who lives in assisted living with his wife. He states that he does not walk at all, and uses a wheelchair. Patient presents with complaints of right leg pain. Interval history / Subjective:   Seen in room 574. No pain at all. No complaints. Assessment & Plan:     Left femur comminuted fracture,possibly pathological,ho apparent h/o fall, he presented with leg pain bilateral, acute on chronic. Denied fall  S/p Cephalomedullary nailing left hip on   -Doppler negative for VTE  -MBAT LLE per orth  -Lovenox for VTE prophylaxis.  -Busby removal post per protocol     Post operative pain control  -Tylenol 650 mg QID fo4 3 days  -Tramadol and Roxicodone prn  -Physical tehrapy    Pseudomonas UTI was on IV ceftriaxone,pesudomonas resistant to FLQ  -switched abx to cefepime    Chronic kidney disease, stage III. - Creatinine about base line    Mild hypernatremia  -Will give him NS. Improved    Chronic lymphedema of the lower extremities. Ultrasound has ruled out acute deep in thrombosis. Echocardiogram from a few months ago () is essentially normal. ProBNP is in normal range. This is not related to heart failure.     Impaired mobility  -WC at baseline  -decline from base line may be due to left femur fracture,severe OA  -May need SNF    Anemia of chronic disease.  -Hb dropped,no apparent reason,?dilutional,had bolus of 2L NS.  -H up and stable, drop likely was dilutional.    Mild hyperkalemia  -Kayexalate  -Rpt bmp pm        Code status: full  DVT prophylaxis:heparin    Care Plan discussed with: Patient/Family and Nurse  Disposition: need rehab in [facility]  Date Reviewed: 2017          Codes Class Noted POA    * (Principal)Urinary tract infection ICD-10-CM: N39.0  ICD-9-CM: 599.0  4/1/2017 Unknown        Acute kidney injury [facility]) ICD-10-CM: N17.9  ICD-9-CM: 584.9  4/1/2017 Unknown                Review of Systems:   Pertinent items are noted in HPI. Vital Signs:    Last 24hrs VS reviewed since prior progress note. Most recent are:  Visit Vitals    /59 (BP 1 Location: Right arm, BP Patient Position: At rest)    Pulse 88    Temp 97.7 °F (36.5 °C)    Resp 16    Ht 5' 7\" (1.702 m)    Wt 68 kg (150 lb)    SpO2 98%    BMI 23.49 kg/m2         Intake/Output Summary (Last 24 hours) at 04/06/17 0849  Last data filed at 04/06/17 6627   Gross per 24 hour   Intake             1200 ml   Output             2285 ml   Net            -1085 ml        Physical Examination:             Constitutional:  No acute distress, cooperative,not in distress   ENT:  Oral mucous moist, oropharynx benign. Resp:  CTA bilaterally. No wheezing/rhonchi/rales. No accessory muscle use   CV:  Regular rhythm, normal rate, no murmurs, gallops, rubs    GI:  Soft, non distended, non tender. normoactive bowel sounds, no hepatosplenomegaly     Musculoskeletal:  Left hip surgical dressing c/d/i, sensation and pulses intact    Neurologic:  Moves all extremities. Alert and oriented. Legs are weak     Psych:  Good insight, Not anxious nor agitated. Data Review:    Review and/or order of clinical lab test  Review and/or order of tests in the radiology section of CPT  Review and/or order of tests in the medicine section of CPT      Labs:     Recent Labs      04/06/17 0348 04/05/17 0450   HGB  9.0*  8.2*   HCT  29.6*  25.9*     Recent Labs      04/06/17 0348 04/05/17 0450   NA  141  145   K  5.6*  4.2   CL  112*  113*   CO2  20*  22   BUN  44*  42*   CREA  1.58*  1.60*   GLU  154*  92   CA  7.6*  7.5*     No results for input(s): SGOT, GPT, ALT, AP, TBIL, TBILI, TP, ALB, GLOB, GGT, AML, LPSE in the last 72 hours.     No lab exists for component: AMYP, HLPSE  Recent Labs      04/05/17   0450   INR  1.1   PTP  11.4*      No results for input(s): FE, TIBC, PSAT, FERR in the last 72 hours. Lab Results   Component Value Date/Time    Folate 8.4 01/23/2015 07:19 PM      No results for input(s): PH, PCO2, PO2 in the last 72 hours. No results for input(s): CPK, CKNDX, TROIQ in the last 72 hours.     No lab exists for component: CPKMB  No results found for: CHOL, CHOLX, CHLST, CHOLV, HDL, LDL, DLDL, LDLC, DLDLP, TGL, TGLX, TRIGL, TRIGP, CHHD, CHHDX  Lab Results   Component Value Date/Time    Glucose (POC) 118 01/27/2015 12:15 PM    Glucose (POC) 117 01/27/2015 07:26 AM    Glucose (POC) 168 01/26/2015 09:03 PM    Glucose (POC) 146 01/26/2015 04:26 PM    Glucose (POC) 144 01/26/2015 11:27 AM     Lab Results   Component Value Date/Time    Color YELLOW/STRAW 04/02/2017 03:30 PM    Appearance TURBID 04/02/2017 03:30 PM    Specific gravity 1.017 04/02/2017 03:30 PM    pH (UA) 5.5 04/02/2017 03:30 PM    Protein TRACE 04/02/2017 03:30 PM    Glucose NEGATIVE  04/02/2017 03:30 PM    Ketone NEGATIVE  04/02/2017 03:30 PM    Bilirubin NEGATIVE  04/02/2017 03:30 PM    Urobilinogen 0.2 04/02/2017 03:30 PM    Nitrites NEGATIVE  04/02/2017 03:30 PM    Leukocyte Esterase LARGE 04/02/2017 03:30 PM    Epithelial cells FEW 04/02/2017 03:30 PM    Bacteria NEGATIVE  04/02/2017 03:30 PM    WBC >100 04/02/2017 03:30 PM    RBC 0-5 04/02/2017 03:30 PM         Medications Reviewed:     Current Facility-Administered Medications   Medication Dose Route Frequency    acetaminophen (TYLENOL) tablet 650 mg  650 mg Oral Q6H    oxyCODONE IR (ROXICODONE) tablet 5 mg  5 mg Oral Q6H PRN    cefepime (MAXIPIME) 2 g in 0.9% sodium chloride (MBP/ADV) 100 mL  2 g IntraVENous Q24H    zinc oxide 20 % ointment   Topical DIALYSIS PRN    clobetasol (TEMOVATE) 0.05 % cream   Topical BID    0.9% sodium chloride infusion  100 mL/hr IntraVENous CONTINUOUS    sodium chloride (NS) flush 5-10 mL  5-10 mL IntraVENous Q8H    sodium chloride (NS) flush 5-10 mL  5-10 mL IntraVENous PRN    traMADol (ULTRAM) tablet 50 mg  50 mg Oral Q6H PRN    hydrOXYzine HCl (ATARAX) tablet 10 mg  10 mg Oral Q8H PRN    naloxone (NARCAN) injection 0.4 mg  0.4 mg IntraVENous PRN    ondansetron (ZOFRAN ODT) tablet 4 mg  4 mg Oral Q4H PRN    ondansetron (ZOFRAN) injection 4 mg  4 mg IntraVENous Q4H PRN    calcium-vitamin D (OS-PRINCESS) 500 mg-200 unit tablet  1 Tab Oral TID WITH MEALS    senna-docusate (PERICOLACE) 8.6-50 mg per tablet 1 Tab  1 Tab Oral BID    polyethylene glycol (MIRALAX) packet 17 g  17 g Oral DAILY    [START ON 4/7/2017] bisacodyl (DULCOLAX) suppository 10 mg  10 mg Rectal DAILY PRN    enoxaparin (LOVENOX) injection 40 mg  40 mg SubCUTAneous DAILY    balsam peru-castor oil (VENELEX)  mg/gram ointment   Topical BID    sodium chloride (NS) flush 5-10 mL  5-10 mL IntraVENous Q8H    sodium chloride (NS) flush 5-10 mL  5-10 mL IntraVENous PRN    docusate sodium (COLACE) capsule 100 mg  100 mg Oral DAILY    fluticasone (FLONASE) 50 mcg/actuation nasal spray 2 Spray  2 Spray Both Nostrils DAILY    ferrous sulfate tablet 325 mg  325 mg Oral BID WITH MEALS    metoprolol tartrate (LOPRESSOR) tablet 12.5 mg  12.5 mg Oral BID    pantoprazole (PROTONIX) tablet 40 mg  40 mg Oral ACB&D    rosuvastatin (CRESTOR) tablet 20 mg  20 mg Oral QHS    zolpidem (AMBIEN) tablet 5 mg  5 mg Oral QHS     ______________________________________________________________________  EXPECTED LENGTH OF STAY: 3d 2h  ACTUAL LENGTH OF STAY:          5                 Armida Spears MD

## 2017-04-07 VITALS
HEIGHT: 67 IN | HEART RATE: 80 BPM | TEMPERATURE: 97.5 F | SYSTOLIC BLOOD PRESSURE: 118 MMHG | DIASTOLIC BLOOD PRESSURE: 66 MMHG | RESPIRATION RATE: 14 BRPM | WEIGHT: 149.91 LBS | OXYGEN SATURATION: 98 % | BODY MASS INDEX: 23.53 KG/M2

## 2017-04-07 PROBLEM — N39.0 URINARY TRACT INFECTION: Status: RESOLVED | Noted: 2017-04-01 | Resolved: 2017-04-07

## 2017-04-07 PROBLEM — N17.9 ACUTE KIDNEY INJURY (HCC): Status: RESOLVED | Noted: 2017-04-01 | Resolved: 2017-04-07

## 2017-04-07 LAB
ANION GAP BLD CALC-SCNC: 8 MMOL/L (ref 5–15)
BUN SERPL-MCNC: 44 MG/DL (ref 6–20)
BUN/CREAT SERPL: 29 (ref 12–20)
CALCIUM SERPL-MCNC: 7.3 MG/DL (ref 8.5–10.1)
CHLORIDE SERPL-SCNC: 116 MMOL/L (ref 97–108)
CO2 SERPL-SCNC: 24 MMOL/L (ref 21–32)
CREAT SERPL-MCNC: 1.53 MG/DL (ref 0.7–1.3)
GLUCOSE SERPL-MCNC: 91 MG/DL (ref 65–100)
HCT VFR BLD AUTO: 23.8 % (ref 36.6–50.3)
HGB BLD-MCNC: 7.3 G/DL (ref 12.1–17)
POTASSIUM SERPL-SCNC: 4.7 MMOL/L (ref 3.5–5.1)
SODIUM SERPL-SCNC: 148 MMOL/L (ref 136–145)

## 2017-04-07 PROCEDURE — 74011250636 HC RX REV CODE- 250/636: Performed by: HOSPITALIST

## 2017-04-07 PROCEDURE — 36415 COLL VENOUS BLD VENIPUNCTURE: CPT | Performed by: HOSPITALIST

## 2017-04-07 PROCEDURE — 77030011256 HC DRSG MEPILEX <16IN NO BORD MOLN -A

## 2017-04-07 PROCEDURE — 80048 BASIC METABOLIC PNL TOTAL CA: CPT | Performed by: HOSPITALIST

## 2017-04-07 PROCEDURE — 74011250637 HC RX REV CODE- 250/637: Performed by: INTERNAL MEDICINE

## 2017-04-07 PROCEDURE — 74011250637 HC RX REV CODE- 250/637: Performed by: HOSPITALIST

## 2017-04-07 PROCEDURE — 74011000258 HC RX REV CODE- 258: Performed by: HOSPITALIST

## 2017-04-07 PROCEDURE — 74011250637 HC RX REV CODE- 250/637: Performed by: ORTHOPAEDIC SURGERY

## 2017-04-07 PROCEDURE — 85018 HEMOGLOBIN: CPT | Performed by: HOSPITALIST

## 2017-04-07 PROCEDURE — 74011250636 HC RX REV CODE- 250/636: Performed by: ORTHOPAEDIC SURGERY

## 2017-04-07 RX ORDER — AMOXICILLIN 250 MG
1 CAPSULE ORAL 2 TIMES DAILY
Qty: 20 TAB | Refills: 0 | Status: SHIPPED | OUTPATIENT
Start: 2017-04-07 | End: 2017-07-13 | Stop reason: SDUPTHER

## 2017-04-07 RX ORDER — ENOXAPARIN SODIUM 100 MG/ML
40 INJECTION SUBCUTANEOUS DAILY
Qty: 19 SYRINGE | Refills: 0 | Status: SHIPPED
Start: 2017-04-07 | End: 2017-04-26

## 2017-04-07 RX ADMIN — CALCIUM CARBONATE-VITAMIN D TAB 500 MG-200 UNIT 1 TABLET: 500-200 TAB at 08:19

## 2017-04-07 RX ADMIN — FLUTICASONE PROPIONATE 2 SPRAY: 50 SPRAY, METERED NASAL at 09:00

## 2017-04-07 RX ADMIN — CEFEPIME 2 G: 2 INJECTION, POWDER, FOR SOLUTION INTRAVENOUS at 12:02

## 2017-04-07 RX ADMIN — DOCUSATE SODIUM 100 MG: 100 CAPSULE, LIQUID FILLED ORAL at 08:19

## 2017-04-07 RX ADMIN — POLYETHYLENE GLYCOL 3350 17 G: 17 POWDER, FOR SOLUTION ORAL at 08:19

## 2017-04-07 RX ADMIN — CALCIUM CARBONATE-VITAMIN D TAB 500 MG-200 UNIT 1 TABLET: 500-200 TAB at 12:02

## 2017-04-07 RX ADMIN — Medication 10 ML: at 06:40

## 2017-04-07 RX ADMIN — CLOBETASOL PROPIONATE: 0.5 CREAM TOPICAL at 09:00

## 2017-04-07 RX ADMIN — FERROUS SULFATE TAB 325 MG (65 MG ELEMENTAL FE) 325 MG: 325 (65 FE) TAB at 08:19

## 2017-04-07 RX ADMIN — TRAMADOL HYDROCHLORIDE 50 MG: 50 TABLET, FILM COATED ORAL at 03:14

## 2017-04-07 RX ADMIN — PANTOPRAZOLE SODIUM 40 MG: 40 TABLET, DELAYED RELEASE ORAL at 06:40

## 2017-04-07 RX ADMIN — ACETAMINOPHEN 650 MG: 325 TABLET, FILM COATED ORAL at 12:02

## 2017-04-07 RX ADMIN — ACETAMINOPHEN 650 MG: 325 TABLET, FILM COATED ORAL at 06:40

## 2017-04-07 RX ADMIN — DOCUSATE SODIUM AND SENNOSIDES 1 TABLET: 8.6; 5 TABLET, FILM COATED ORAL at 08:19

## 2017-04-07 RX ADMIN — ENOXAPARIN SODIUM 40 MG: 40 INJECTION SUBCUTANEOUS at 08:19

## 2017-04-07 RX ADMIN — CASTOR OIL AND BALSAM, PERU: 788; 87 OINTMENT TOPICAL at 09:00

## 2017-04-07 NOTE — DISCHARGE INSTRUCTIONS
Discharge SNF/Rehab Instructions/LTAC       PATIENT ID: Nirav Reza  MRN: [id number]   YOB: 1935    DATE OF ADMISSION: 4/1/2017  1:08 PM    DATE OF DISCHARGE: 4/7/2017    PRIMARY CARE PROVIDER: Macy Gallegos MD       ATTENDING PHYSICIAN: Beulah Baumann MD  DISCHARGING PROVIDER: Beulah Baumann MD     To contact this individual call [phone] and ask the  to page. If unavailable ask to be transferred the Adult Hospitalist Department. CONSULTATIONS: IP CONSULT TO HOSPITALIST  IP CONSULT TO ORTHOPEDIC SURGERY    PROCEDURES/SURGERIES: Procedure(s):  IM NAIL LEFT HIP    [address] COURSE:     Admission Summary:     Patient is an 44-year-old white male who lives in assisted living with his wife. He states that he does not walk at all, and uses a wheelchair. Patient presents with complaints of right leg pain. DISCHARGE DIAGNOSES / PLAN:    Left femur comminuted fracture,possibly pathological,ho apparent h/o fall, he presented with leg pain bilateral, acute on chronic. Denied fall  S/p Cephalomedullary nailing left hip on 4/5  -Doppler negative for VTE  -MBAT LLE per orth  -Lovenox for VTE prophylaxis. Post operative pain control  -Tylenol 650 mg QID fo4 3 days  -Tramadol and Roxicodone prn  -Physical tehrapy    Pseudomonas UTI was on IV ceftriaxone,pesudomonas resistant to FLQ. CFU low, pt was not symptomatic,did not come with lorenzo,if anything this is simpel UTI  -Received 5 days of ceftriaxone and he would complete 3rd dose cefepime today,then will stop abx. Chronic kidney disease, stage III. - Creatinine about base line    Mild hypernatremia  -Eating better  -Expect Na to improve. Please check BMP in 3 days. Chronic lymphedema of the lower extremities. Ultrasound has ruled out acute deep in thrombosis. Echocardiogram from a few months ago (November, 2016) is essentially normal. ProBNP is in normal range.  This is not related to heart failure. Impaired mobility  -WC at baseline  -On going physical therapy. Anemia of chronic disease,componeent of acute post op blood loss  -Hb dropped,no apparent reason,?dilutional,had bolus of 2L NS.  -Hb stable. Recheck in 3 days. Mild hyperkalemia,resolved  -Received Kayexalate        Patient medically stable on day of discharge. Orthopedics cleared him for discharge. PENDING TEST RESULTS:   At the time of discharge the following test results are still pending: none    FOLLOW UP APPOINTMENTS:    Follow-up Information     Follow up With Details Comments Contact Info    [facility] [address] [place] [address]  [phone]            ADDITIONAL CARE RECOMMENDATIONS:   Please check BMP and HB/HCT on Monday,April 10,2017    DIET:   1. Continue regular diet; Adjust PRN for low K+   2. RD add Nepro BID  3. RD update food preferences and attempt snacks  4. Staff set-up meal trays and assist/prompt to improve intake  5. Assure denture adehesive  6. Consider Nephrocap (renal MV) with poor appetite    TUBE FEEDING INSTRUCTIONS: NA    OXYGEN / BiPAP SETTINGS: on room air    ACTIVITY: Activity as tolerated and PT/OT Eval and Treat    WOUND CARE:  Wound care team assessed and recommended the following  Bilateral heel skin intact and without erythema.      Mixed tissues over gluteal cleft, sacrum, and buttocks. There are rings around anus, buttocks and on posterior thigh suggestive of BSC or toilet seat trauma now much lighter in color and remain intact. Sacrum and gluteal cleft with MASD and scattered partial thickness tissue loss, fully blanching pink wit some maceration. Top of gluteal cleft with non-blanching purple now resolved/fully blanching pink. Venelex in use.       Heels offloaded on pillow.      Recommendations:   Continue Venelex as ordered.      Skin Care & Pressure Prevention:  Minimize layers of linen/pads under patient to optimize support surface. Turn/reposition approximately every 2 hours and offload heels    EQUIPMENT needed: own WC      DISCHARGE MEDICATIONS:   See Medication Reconciliation Form      NOTIFY YOUR PHYSICIAN FOR ANY OF THE FOLLOWING:   Fever over 101 degrees for 24 hours. Chest pain, shortness of breath, fever, chills, nausea, vomiting, diarrhea, change in mentation, falling, weakness, bleeding. Severe pain or pain not relieved by medications. Or, any other signs or symptoms that you may have questions about. DISPOSITION:    Home With:   OT  PT  HH  RN      x SNF/Inpatient Rehab/LTAC    Independent/assisted living    Hospice    Other:       PATIENT CONDITION AT DISCHARGE:     Functional status   x Poor     Deconditioned     Independent      Cognition    x Lucid     Forgetful     Dementia      Catheters/lines (plus indication)    Busby     PICC     PEG    x None      Code status    x Full code     DNR      PHYSICAL EXAMINATION AT DISCHARGE:             Constitutional:  No acute distress, cooperative,not in distress   ENT:  Oral mucous moist, oropharynx benign. Resp:  CTA bilaterally. No wheezing/rhonchi/rales. No accessory muscle use   CV:  Regular rhythm, normal rate, no murmurs, gallops, rubs    GI:  Soft, non distended, non tender. normoactive bowel sounds, no hepatosplenomegaly     Musculoskeletal:  Left hip surgical dressing c/d/i, sensation and pulses intact    Neurologic:  Moves all extremities. Alert and oriented. Legs are weak     Psych:  Good insight, Not anxious nor agitated.         CHRONIC MEDICAL DIAGNOSES:  Problem List as of 4/7/2017  Date Reviewed: 4/6/2017          Codes Class Noted - Resolved    * (Principal)Urinary tract infection ICD-10-CM: N39.0  ICD-9-CM: 599.0  4/1/2017 - Present        Acute kidney injury ([facility] [address] ICD-10-CM: N17.9  ICD-9-CM: 584.9  4/1/2017 - Present        UTI (urinary tract infection) ICD-10-CM: N39.0  ICD-9-CM: 599.0  11/7/2016 - Present        Renal failure ICD-10-CM: N19  ICD-9-CM: 869 1/23/2015 - Present        Falls ICD-10-CM: W19. Alessandra Folds  ICD-9-CM: E888.9  1/23/2015 - Present        Rhabdomyolysis ICD-10-CM: M62.82  ICD-9-CM: 728.88  1/23/2015 - Present        Weight loss ICD-10-CM: R63.4  ICD-9-CM: 783.21  1/23/2015 - Present        Night sweats ICD-10-CM: R61  ICD-9-CM: 780.8  1/23/2015 - Present        Anorexia ICD-10-CM: R63.0  ICD-9-CM: 783.0  1/23/2015 - Present        Anemia ICD-10-CM: D64.9  ICD-9-CM: 285.9  1/23/2015 - Present        Weakness ICD-10-CM: R53.1  ICD-9-CM: 780.79  3/20/2013 - Present        Sinus tachycardia ICD-10-CM: R00.0  ICD-9-CM: 427.89  3/20/2013 - Present        ARF (acute renal failure) (HCC) ICD-10-CM: N17.9  ICD-9-CM: 584.9  3/20/2013 - Present        Hypercholesteremia ICD-10-CM: E78.00  ICD-9-CM: 272.0  Unknown - Present        Hypertension ICD-10-CM: I10  ICD-9-CM: 401.9  Unknown - Present        Arthritis ICD-10-CM: M19.90  ICD-9-CM: 716.90  Unknown - Present        Peripheral neuropathy ([facility] [address] ICD-10-CM: G62.9  ICD-9-CM: 356.9  3/20/2013 - Present        Tremor ICD-10-CM: R25.1  ICD-9-CM: 781.0  11/26/2012 - Present        RESOLVED: Vomiting ICD-10-CM: R11.10  ICD-9-CM: 787.03  1/23/2015 - 11/10/2016        RESOLVED: Osteoarthritis of right knee (Chronic) ICD-10-CM: M17.11  ICD-9-CM: 715.96  2/11/2014 - 1/23/2015        RESOLVED: Nausea & vomiting ICD-10-CM: R11.2  ICD-9-CM: 787.01  3/20/2013 - 1/23/2015        RESOLVED: Orthostatic hypotension ICD-10-CM: I95.1  ICD-9-CM: 458.0  3/20/2013 - 1/23/2015        RESOLVED: Abdominal pain, RLQ ICD-10-CM: R10.31  ICD-9-CM: 789.03  3/20/2013 - 1/23/2015        RESOLVED: Hyperkalemia ICD-10-CM: E87.5  ICD-9-CM: 276.7  3/20/2013 - 1/23/2015        RESOLVED: Dehydration ICD-10-CM: E86.0  ICD-9-CM: 276.51  3/20/2013 - 1/23/2015        RESOLVED: Weight loss ICD-10-CM: R63.4  ICD-9-CM: 783.21  3/20/2013 - 1/23/2015        RESOLVED: Memory disorder ICD-10-CM: R41.3  ICD-9-CM: 780.93  Unknown - 1/23/2015        RESOLVED: Joint pain ICD-10-CM: M25.50  ICD-9-CM: 719.40  Unknown - 1/23/2015        RESOLVED: Insomnia ICD-10-CM: G47.00  ICD-9-CM: 780.52  3/20/2013 - 1/23/2015                CDMP Checked:   Yes ***     PROBLEM LIST Updated:  Yes ***         Signed:    Grazyna Antoine MD  4/7/2017  9:16 AM
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
## 2017-04-07 NOTE — PROGRESS NOTES
Report called to [facility] at [phone]. Patient leaving via ambulance at [phone].

## 2017-04-07 NOTE — PROGRESS NOTES
The patient will discharge today at 2:30pm via [facility] ambulance to [facility] the rep was notified along with the daughter. Kardex, mars, discharge orders and ambulance PCS will follow. Discharge orders available on the AVS in [facility] for the facility to view.  DAREN

## 2017-04-07 NOTE — PROGRESS NOTES
Hospitalist Progress Note  Rand Yo MD  Office: [phone]        Date of Service:  2017  NAME:  Nena Solis  :  1935  MRN:  [id number]      Admission Summary:     Patient is an 28-year-old white male who lives in assisted living with his wife. He states that he does not walk at all, and uses a wheelchair. Patient presents with complaints of right leg pain. Interval history / Subjective:   Seen in room 574. Eating breakfast,some pain from the surgical side. No fever or chills. Assessment & Plan:     Left femur comminuted fracture,possibly pathological,ho apparent h/o fall, he presented with leg pain bilateral, acute on chronic. Denied fall  S/p Cephalomedullary nailing left hip on   -Doppler negative for VTE  -MBAT LLE per orth  -Lovenox for VTE prophylaxis. -D/ c today if okay with orthopedics    Post operative pain control  -Tylenol 650 mg QID fo4 3 days  -Tramadol and Roxicodone prn  -Physical tehrapy    Pseudomonas UTI was on IV ceftriaxone,pesudomonas resistant to FLQ. CFU low, pt was not symptomatic,did not come with lorenzo,if anything this is simpel UTI  -Received 5 days of ceftriaxone and he would complete 3rd dose cefepime today,then will stop abx. Chronic kidney disease, stage III. - Creatinine about base line    Mild hypernatremia  -Eating better  -Expect Na to improve. Will ask SNF to recheck in 2-3 days. Chronic lymphedema of the lower extremities. Ultrasound has ruled out acute deep in thrombosis. Echocardiogram from a few months ago () is essentially normal. ProBNP is in normal range. This is not related to heart failure.     Impaired mobility  -WC at baseline  -decline from base line may be due to left femur fracture,severe OA  -Discharge to SNF    Anemia of chronic disease,componeent of acute post op blood loss  -Hb dropped,no apparent reason,?dilutional,had bolus of 2L NS.  -Hb stable    Mild hyperkalemia,resolved  -Received Kayexalate        Code status: full  DVT prophylaxis:heparin    Care Plan discussed with: Patient/Family and Nurse  Disposition: need rehab in [facility]  Date Reviewed: 4/6/2017          Codes Class Noted POA    * (Principal)Urinary tract infection ICD-10-CM: N39.0  ICD-9-CM: 599.0  4/1/2017 Unknown        Acute kidney injury [facility]) ICD-10-CM: N17.9  ICD-9-CM: 584.9  4/1/2017 Unknown                Review of Systems:   Pertinent items are noted in HPI. Vital Signs:    Last 24hrs VS reviewed since prior progress note. Most recent are:  Visit Vitals    /66 (BP 1 Location: Right arm, BP Patient Position: At rest)    Pulse 80    Temp 97.5 °F (36.4 °C)    Resp 14    Ht 5' 7\" (1.702 m)    Wt 68 kg (149 lb 14.6 oz)    SpO2 98%    BMI 23.48 kg/m2         Intake/Output Summary (Last 24 hours) at 04/07/17 0858  Last data filed at 04/06/17 1514   Gross per 24 hour   Intake                0 ml   Output              400 ml   Net             -400 ml        Physical Examination:             Constitutional:  No acute distress, cooperative,not in distress   ENT:  Oral mucous moist, oropharynx benign. Resp:  CTA bilaterally. No wheezing/rhonchi/rales. No accessory muscle use   CV:  Regular rhythm, normal rate, no murmurs, gallops, rubs    GI:  Soft, non distended, non tender. normoactive bowel sounds, no hepatosplenomegaly     Musculoskeletal:  Left hip surgical dressing c/d/i, sensation and pulses intact    Neurologic:  Moves all extremities. Alert and oriented. Legs are weak     Psych:  Good insight, Not anxious nor agitated.        Data Review:    Review and/or order of clinical lab test  Review and/or order of tests in the radiology section of CPT  Review and/or order of tests in the medicine section of CPT      Labs:     Recent Labs      04/07/17 0320 04/06/17   0348   HGB  7.3*  9.0*   HCT  23.8*  29.6*     Recent Labs      04/07/17 0320 04/06/17   1539  04/06/17   0348   NA  148*  147*  141   K  4.7  4.6  5.6*   CL  116*  113*  112*   CO2  24  24  20*   BUN  44*  42*  44*   CREA  1.53*  1.65*  1.58*   GLU  91  128*  154*   CA  7.3*  7.3*  7.6*     No results for input(s): SGOT, GPT, ALT, AP, TBIL, TBILI, TP, ALB, GLOB, GGT, AML, LPSE in the last 72 hours. No lab exists for component: AMYP, HLPSE  Recent Labs      04/05/17   0450   INR  1.1   PTP  11.4*      No results for input(s): FE, TIBC, PSAT, FERR in the last 72 hours. Lab Results   Component Value Date/Time    Folate 8.4 01/23/2015 07:19 PM      No results for input(s): PH, PCO2, PO2 in the last 72 hours. No results for input(s): CPK, CKNDX, TROIQ in the last 72 hours.     No lab exists for component: CPKMB  No results found for: CHOL, CHOLX, CHLST, CHOLV, HDL, LDL, DLDL, LDLC, DLDLP, TGL, TGLX, TRIGL, TRIGP, CHHD, CHHDX  Lab Results   Component Value Date/Time    Glucose (POC) 118 01/27/2015 12:15 PM    Glucose (POC) 117 01/27/2015 07:26 AM    Glucose (POC) 168 01/26/2015 09:03 PM    Glucose (POC) 146 01/26/2015 04:26 PM    Glucose (POC) 144 01/26/2015 11:27 AM     Lab Results   Component Value Date/Time    Color YELLOW/STRAW 04/02/2017 03:30 PM    Appearance TURBID 04/02/2017 03:30 PM    Specific gravity 1.017 04/02/2017 03:30 PM    pH (UA) 5.5 04/02/2017 03:30 PM    Protein TRACE 04/02/2017 03:30 PM    Glucose NEGATIVE  04/02/2017 03:30 PM    Ketone NEGATIVE  04/02/2017 03:30 PM    Bilirubin NEGATIVE  04/02/2017 03:30 PM    Urobilinogen 0.2 04/02/2017 03:30 PM    Nitrites NEGATIVE  04/02/2017 03:30 PM    Leukocyte Esterase LARGE 04/02/2017 03:30 PM    Epithelial cells FEW 04/02/2017 03:30 PM    Bacteria NEGATIVE  04/02/2017 03:30 PM    WBC >100 04/02/2017 03:30 PM    RBC 0-5 04/02/2017 03:30 PM         Medications Reviewed:     Current Facility-Administered Medications   Medication Dose Route Frequency    acetaminophen (TYLENOL) tablet 650 mg  650 mg Oral Q6H    oxyCODONE IR (ROXICODONE) tablet 5 mg  5 mg Oral Q6H PRN    cefepime (MAXIPIME) 2 g in 0.9% sodium chloride (MBP/ADV) 100 mL  2 g IntraVENous Q24H    zinc oxide 20 % ointment   Topical DIALYSIS PRN    clobetasol (TEMOVATE) 0.05 % cream   Topical BID    sodium chloride (NS) flush 5-10 mL  5-10 mL IntraVENous Q8H    sodium chloride (NS) flush 5-10 mL  5-10 mL IntraVENous PRN    traMADol (ULTRAM) tablet 50 mg  50 mg Oral Q6H PRN    hydrOXYzine HCl (ATARAX) tablet 10 mg  10 mg Oral Q8H PRN    naloxone (NARCAN) injection 0.4 mg  0.4 mg IntraVENous PRN    ondansetron (ZOFRAN ODT) tablet 4 mg  4 mg Oral Q4H PRN    calcium-vitamin D (OS-PRINCESS) 500 mg-200 unit tablet  1 Tab Oral TID WITH MEALS    senna-docusate (PERICOLACE) 8.6-50 mg per tablet 1 Tab  1 Tab Oral BID    polyethylene glycol (MIRALAX) packet 17 g  17 g Oral DAILY    bisacodyl (DULCOLAX) suppository 10 mg  10 mg Rectal DAILY PRN    enoxaparin (LOVENOX) injection 40 mg  40 mg SubCUTAneous DAILY    balsam peru-castor oil (VENELEX)  mg/gram ointment   Topical BID    sodium chloride (NS) flush 5-10 mL  5-10 mL IntraVENous Q8H    sodium chloride (NS) flush 5-10 mL  5-10 mL IntraVENous PRN    docusate sodium (COLACE) capsule 100 mg  100 mg Oral DAILY    fluticasone (FLONASE) 50 mcg/actuation nasal spray 2 Spray  2 Spray Both Nostrils DAILY    ferrous sulfate tablet 325 mg  325 mg Oral BID WITH MEALS    metoprolol tartrate (LOPRESSOR) tablet 12.5 mg  12.5 mg Oral BID    pantoprazole (PROTONIX) tablet 40 mg  40 mg Oral ACB&D    rosuvastatin (CRESTOR) tablet 20 mg  20 mg Oral QHS    zolpidem (AMBIEN) tablet 5 mg  5 mg Oral QHS     ______________________________________________________________________  EXPECTED LENGTH OF STAY: 4d 14h  ACTUAL LENGTH OF STAY:          6                 Mykel Rodriguez MD

## 2017-04-10 ENCOUNTER — EXTERNAL NURSING HOME DOCUMENTATION (OUTPATIENT)
Dept: INTERNAL MEDICINE CLINIC | Age: 82
End: 2017-04-10

## 2017-04-10 DIAGNOSIS — I10 ESSENTIAL HYPERTENSION: ICD-10-CM

## 2017-04-10 DIAGNOSIS — M25.552 LEFT HIP PAIN: Primary | ICD-10-CM

## 2017-04-10 DIAGNOSIS — S72.92XS CLOSED FRACTURE OF LEFT FEMUR, UNSPECIFIED FRACTURE MORPHOLOGY, UNSPECIFIED PORTION OF FEMUR, SEQUELA: ICD-10-CM

## 2017-04-10 DIAGNOSIS — N18.9 CKD (CHRONIC KIDNEY DISEASE), UNSPECIFIED STAGE: ICD-10-CM

## 2017-04-10 DIAGNOSIS — M19.90 ARTHRITIS: ICD-10-CM

## 2017-04-10 DIAGNOSIS — E78.00 HYPERCHOLESTEREMIA: ICD-10-CM

## 2017-04-10 NOTE — PROGRESS NOTES
THIS IS NOT A COMPLETE MEDICAL RECORD ON THIS PATIENT. THIS IS A PATIENT AT [facility]. PLEASE CONTACT THE FACILITY LISTED BELOW FOR MORE INFORMATION ON THIS PATIENT. THE COMPLETE RECORD RESIDES WITH THIS LONG TERM CARE FACILITY. HISTORY OF PRESENT ILLNESS  Nena Solis is a [de-identified] y.o. male. This patient is seen today at [facility] for initial visit, per nursing request.  The patient will be under the care of Dr. Yolie Choi. The patient was admitted after hospitalization at [address] [place] [address].    The patient was found to have left femur comminuted fracture and is s/p cephalomedullary nailing of left hip. The patient was also treated for UTI with Rocephin. His past medical history includes arrhythmia, arthritis, anemia, hypercholesterolemia, hypertension, CKD, and GERD. Per nursing, the patient has had complaints of pain to his left leg with therapy and with turning in bed. He is currently ordered Tylenol. Per hospital chart, patient was receiving PRN Tramadol and Oxycodone in hospital.  HPI    Review of Systems   Constitutional: Negative for chills and fever. HENT: Negative for congestion. Respiratory: Negative for cough and shortness of breath. Cardiovascular: Negative for chest pain and leg swelling. Gastrointestinal: Negative for abdominal pain. Genitourinary: Negative. Musculoskeletal: Positive for joint pain. Skin: Negative. Neurological: Negative for dizziness and headaches. Physical Exam   Constitutional: He is oriented to person, place, and time. He appears well-developed and well-nourished. No distress. HENT:   Head: Normocephalic and atraumatic. Cardiovascular: Normal rate and regular rhythm. Pulmonary/Chest: Effort normal and breath sounds normal. No respiratory distress. He has no wheezes. He has no rales. Abdominal: Soft. Bowel sounds are normal. He exhibits no distension. There is no tenderness. Musculoskeletal: He exhibits no edema. Neurological: He is alert and oriented to person, place, and time. Skin: Skin is warm and dry. Clean, dry dressing to left hip and left thigh   Psychiatric: He has a normal mood and affect. ASSESSMENT and PLAN  Encounter Diagnoses   Name Primary?  Left hip pain Yes    Arthritis     Hypercholesteremia     Essential hypertension     CKD (chronic kidney disease), unspecified stage     Closed fracture of left femur, unspecified fracture morphology, unspecified portion of femur, sequela      Will order Tramadol 50 mg po bid PRN pain. May continue PRN Tylenol, as ordered. Continue PT/OT as tolerated. Lab results and schedule of future lab studies reviewed. Labs drawn today, await results. Reviewed medications and side effects in detail    Nursing to continue to monitor closely and notify MD/NP of any change in condition.

## 2017-04-13 ENCOUNTER — EXTERNAL NURSING HOME DOCUMENTATION (OUTPATIENT)
Dept: INTERNAL MEDICINE CLINIC | Age: 82
End: 2017-04-13

## 2017-04-13 DIAGNOSIS — D64.9 CHRONIC ANEMIA: ICD-10-CM

## 2017-04-13 DIAGNOSIS — R53.81 DEBILITY: ICD-10-CM

## 2017-04-13 DIAGNOSIS — N18.30 CKD (CHRONIC KIDNEY DISEASE) STAGE 3, GFR 30-59 ML/MIN (HCC): ICD-10-CM

## 2017-04-13 DIAGNOSIS — I10 ESSENTIAL HYPERTENSION: ICD-10-CM

## 2017-04-13 DIAGNOSIS — S72.002A CLOSED LEFT HIP FRACTURE, INITIAL ENCOUNTER (HCC): Primary | ICD-10-CM

## 2017-04-13 DIAGNOSIS — N39.0 URINARY TRACT INFECTION WITHOUT HEMATURIA, SITE UNSPECIFIED: ICD-10-CM

## 2017-04-26 ENCOUNTER — EXTERNAL NURSING HOME DOCUMENTATION (OUTPATIENT)
Dept: INTERNAL MEDICINE CLINIC | Age: 82
End: 2017-04-26

## 2017-04-26 DIAGNOSIS — N18.30 CKD (CHRONIC KIDNEY DISEASE) STAGE 3, GFR 30-59 ML/MIN (HCC): ICD-10-CM

## 2017-04-26 DIAGNOSIS — S72.002S CLOSED LEFT HIP FRACTURE, SEQUELA: Primary | ICD-10-CM

## 2017-04-26 DIAGNOSIS — R53.81 DEBILITY: ICD-10-CM

## 2017-04-26 DIAGNOSIS — D64.9 CHRONIC ANEMIA: ICD-10-CM

## 2017-04-26 DIAGNOSIS — I10 ESSENTIAL HYPERTENSION: ICD-10-CM

## 2017-04-26 NOTE — PROGRESS NOTES
History of Present Illness:  Oneyda Amaral is an [de-identified] y.o. white male seen at [facility] for follow up. He came here recently after hospitalization following a fall with left hip fracture. He had ORIF. Also, had UTI. He has a few chronic medical issues. The patient reports minimal pain in the left hip and doing well with PT and OT. The staff do not report any significant problems at this time. Allergies: None.     Medications: See NH list reviewed and signed. Studies:  Labs significant for hemoglobin 8.0, WBC 7.0, BUN 36, creatinine 1.58.      Physical Examination:  GENERAL: Vital signs stable. Afebrile per nurse's notes. Pleasant elderly man in chair, NAD, and answering simple questions properly. HEENT: Unremarkable. NECK: Supple, no JVD, thyromegaly or bruits. HEART: Regular, distant sounds. LUNGS: Diminished sounds, but clear. ABDOMEN: Soft, nontender. EXTREMITIES: No significant edema. DJD changes. Left hip surgical scar looks fine. Staples have been removed. There is minimal edema. NEURO: Generalized weakness, otherwise nonfocal.      Impression:   Left hip fracture with recent surgery. CKD. Chronic anemia. HTN. Debility.     Plan:  Continue current medications. Continue PT and OT. Overall improving, stable.   Full code.     MedDATA/gwo

## 2017-04-30 PROBLEM — N18.30 CKD (CHRONIC KIDNEY DISEASE) STAGE 3, GFR 30-59 ML/MIN (HCC): Status: ACTIVE | Noted: 2017-04-30

## 2017-04-30 PROBLEM — R53.81 DEBILITY: Status: ACTIVE | Noted: 2017-04-30

## 2017-04-30 PROBLEM — D64.9 CHRONIC ANEMIA: Status: ACTIVE | Noted: 2017-04-30

## 2017-05-03 ENCOUNTER — EXTERNAL NURSING HOME DOCUMENTATION (OUTPATIENT)
Dept: INTERNAL MEDICINE CLINIC | Age: 82
End: 2017-05-03

## 2017-05-03 DIAGNOSIS — D64.9 CHRONIC ANEMIA: ICD-10-CM

## 2017-05-03 DIAGNOSIS — I10 ESSENTIAL HYPERTENSION: ICD-10-CM

## 2017-05-03 DIAGNOSIS — R53.81 DEBILITY: ICD-10-CM

## 2017-05-03 DIAGNOSIS — N18.30 CKD (CHRONIC KIDNEY DISEASE) STAGE 3, GFR 30-59 ML/MIN (HCC): ICD-10-CM

## 2017-05-03 DIAGNOSIS — S72.002S HIP FRACTURE, LEFT, SEQUELA: ICD-10-CM

## 2017-05-03 DIAGNOSIS — M25.552 LEFT HIP PAIN: Primary | ICD-10-CM

## 2017-05-10 ENCOUNTER — EXTERNAL NURSING HOME DOCUMENTATION (OUTPATIENT)
Dept: INTERNAL MEDICINE CLINIC | Age: 82
End: 2017-05-10

## 2017-05-10 DIAGNOSIS — R53.81 DEBILITY: ICD-10-CM

## 2017-05-10 DIAGNOSIS — I10 ESSENTIAL HYPERTENSION: ICD-10-CM

## 2017-05-10 DIAGNOSIS — M25.552 LEFT HIP PAIN: Primary | ICD-10-CM

## 2017-05-10 DIAGNOSIS — N18.30 CKD (CHRONIC KIDNEY DISEASE) STAGE 3, GFR 30-59 ML/MIN (HCC): ICD-10-CM

## 2017-05-10 DIAGNOSIS — Z98.890 STATUS POST HIP SURGERY: ICD-10-CM

## 2017-05-10 DIAGNOSIS — D64.9 CHRONIC ANEMIA: ICD-10-CM

## 2017-05-10 NOTE — PROGRESS NOTES
Zara Sage is an [de-identified] y.o. man seen at [facility] for follow-up. Admitted here recently after hospitalization with left hip fracture and ORIF, also had a UTI. Has a few chronic medical issues. He is a poor historian. Answers simple questions properly. Reports minimal pain in the left hip, is doing well with PT and OT. The staff does not report any significant new problems. No allergies. , med list reviewed and signed. Labs:  Most recent labs significant for BUN 36, creatinine 1.58, sodium 146, hemoglobin 8.0. He is sleeping okay, he is eating fine. Physical Examination:  Vital signs stable, afebrile per nurses notes, elderly man, in chair, NAD, answers simple questions properly. NECK:  Supple. HEART:  Regular. LUNGS:  Diminished sounds but clear. ABDOMEN:  Soft, nontender. EXTREMITIES:  No significant edema. Left hip surgical scar looks fine. Decreased ROM of the hip with minimal pain. NEURO:  Generalized weakness, otherwise nonfocal.     Impression:   1. Left hip fracture with pain. 2. Recent left hip ORIF. 3. CKD. 4. Chronic anemia. 5. HTN. 6. Debility. Plan:   1. Continue current meds. 2. Continue PT and OT. 3. Pain control. 4. Discharge planning. 5. Full code.

## 2017-05-24 ENCOUNTER — EXTERNAL NURSING HOME DOCUMENTATION (OUTPATIENT)
Dept: INTERNAL MEDICINE CLINIC | Age: 82
End: 2017-05-24

## 2017-05-24 DIAGNOSIS — M25.552 LEFT HIP PAIN: Primary | ICD-10-CM

## 2017-05-24 DIAGNOSIS — N18.30 CKD (CHRONIC KIDNEY DISEASE) STAGE 3, GFR 30-59 ML/MIN (HCC): ICD-10-CM

## 2017-05-24 DIAGNOSIS — M25.561 CHRONIC PAIN OF RIGHT KNEE: ICD-10-CM

## 2017-05-24 DIAGNOSIS — I10 ESSENTIAL HYPERTENSION: ICD-10-CM

## 2017-05-24 DIAGNOSIS — Z98.890 STATUS POST HIP SURGERY: ICD-10-CM

## 2017-05-24 DIAGNOSIS — D64.9 CHRONIC ANEMIA: ICD-10-CM

## 2017-05-24 DIAGNOSIS — G89.29 CHRONIC PAIN OF RIGHT KNEE: ICD-10-CM

## 2017-05-24 NOTE — PROGRESS NOTES
Solomon Carmichael is an [de-identified] y.o. man seen at [facility] for follow up, came her a few weeks ago after hospitalization with left hip fracture. He has an ORIF, also had a UTI. Has a few chronic medical issues. He is a poor historian, answers simple questions. Reports some pain in the right knee where he has had a TKR, tells me left hip is actually doing well with PT and OT. No other significant problems reports by patient or staff. No allergies. NH med list reviewed and signed. His labs have been stable. VITAL SIGNS:  Stable, afebrile per nurses notes. Elderly man lying in bed, NAD. HEENT:  Unremarkable. NECK:  Supple, no JVD or bruits. HEART:  Regular. LUNGS:  Clear. ABDOMEN:  Soft, nontender. EXTREMITIES:  No edema, left hip surgical scar has healed nicely, able to raise his left leg without much difficulty. Right knee surgical scar, has some tenderness in that area with decreased ROM. NEURO: Generalized weakness, otherwise nonfocal.     Impression:   1. Left hip fracture. 2. Recent left hip ORIF. 3. Right knee pain with arthritis. 4. Chronic anemia. 5. CKD. 6. HTN. 7. Debility. Plan:   1. Continue current meds. 2. Continue PT/OT. 3. Overall seems stable and improved. 4. Discharge planning. 5. Full code.

## 2017-05-31 PROBLEM — Z98.890 STATUS POST HIP SURGERY: Status: ACTIVE | Noted: 2017-05-31

## 2017-05-31 PROBLEM — G89.29 CHRONIC PAIN OF RIGHT KNEE: Status: ACTIVE | Noted: 2017-05-31

## 2017-05-31 PROBLEM — M25.561 CHRONIC PAIN OF RIGHT KNEE: Status: ACTIVE | Noted: 2017-05-31

## 2017-06-05 ENCOUNTER — EXTERNAL NURSING HOME DOCUMENTATION (OUTPATIENT)
Dept: INTERNAL MEDICINE CLINIC | Age: 82
End: 2017-06-05

## 2017-06-05 DIAGNOSIS — I10 ESSENTIAL HYPERTENSION: ICD-10-CM

## 2017-06-05 DIAGNOSIS — M19.90 ARTHRITIS: ICD-10-CM

## 2017-06-05 DIAGNOSIS — S72.002S CLOSED LEFT HIP FRACTURE, SEQUELA: Primary | ICD-10-CM

## 2017-06-05 DIAGNOSIS — E78.00 HYPERCHOLESTEREMIA: ICD-10-CM

## 2017-06-05 DIAGNOSIS — D64.9 CHRONIC ANEMIA: ICD-10-CM

## 2017-06-05 DIAGNOSIS — N18.30 CKD (CHRONIC KIDNEY DISEASE) STAGE 3, GFR 30-59 ML/MIN (HCC): ICD-10-CM

## 2017-07-13 RX ORDER — ACETAMINOPHEN 500 MG
TABLET ORAL
Qty: 120 TAB | Refills: 0 | Status: SHIPPED | OUTPATIENT
Start: 2017-07-13 | End: 2018-01-31

## 2017-07-13 RX ORDER — DOCUSATE SODIUM 50MG AND SENNOSIDES 8.6MG 8.6; 5 MG/1; MG/1
TABLET, FILM COATED ORAL
Qty: 60 TAB | Refills: 0 | Status: SHIPPED | OUTPATIENT
Start: 2017-07-13

## 2017-07-22 ENCOUNTER — HOSPITAL ENCOUNTER (INPATIENT)
Age: 82
LOS: 3 days | Discharge: NURSING, REHAB OR LONG-TERM CARE | DRG: 683 | End: 2017-07-25
Attending: STUDENT IN AN ORGANIZED HEALTH CARE EDUCATION/TRAINING PROGRAM | Admitting: HOSPITALIST
Payer: MEDICARE

## 2017-07-22 ENCOUNTER — APPOINTMENT (OUTPATIENT)
Dept: GENERAL RADIOLOGY | Age: 82
DRG: 683 | End: 2017-07-22
Attending: STUDENT IN AN ORGANIZED HEALTH CARE EDUCATION/TRAINING PROGRAM
Payer: MEDICARE

## 2017-07-22 DIAGNOSIS — I89.0 LYMPHEDEMA OF BOTH LOWER EXTREMITIES: ICD-10-CM

## 2017-07-22 DIAGNOSIS — D63.8 ANEMIA OF CHRONIC DISEASE: ICD-10-CM

## 2017-07-22 DIAGNOSIS — N17.9 AKI (ACUTE KIDNEY INJURY) (HCC): Primary | ICD-10-CM

## 2017-07-22 LAB
ALBUMIN SERPL BCP-MCNC: 2.7 G/DL (ref 3.5–5)
ALBUMIN/GLOB SERPL: 0.8 {RATIO} (ref 1.1–2.2)
ALP SERPL-CCNC: 185 U/L (ref 45–117)
ALT SERPL-CCNC: 17 U/L (ref 12–78)
ANION GAP BLD CALC-SCNC: 5 MMOL/L (ref 5–15)
APPEARANCE UR: ABNORMAL
AST SERPL W P-5'-P-CCNC: 21 U/L (ref 15–37)
BACTERIA URNS QL MICRO: NEGATIVE /HPF
BASOPHILS # BLD AUTO: 0.1 K/UL (ref 0–0.1)
BASOPHILS # BLD: 2 % (ref 0–1)
BILIRUB SERPL-MCNC: 0.3 MG/DL (ref 0.2–1)
BILIRUB UR QL: NEGATIVE
BUN SERPL-MCNC: 61 MG/DL (ref 6–20)
BUN/CREAT SERPL: 24 (ref 12–20)
CALCIUM SERPL-MCNC: 7.6 MG/DL (ref 8.5–10.1)
CHLORIDE SERPL-SCNC: 117 MMOL/L (ref 97–108)
CO2 SERPL-SCNC: 24 MMOL/L (ref 21–32)
COLOR UR: ABNORMAL
CREAT SERPL-MCNC: 2.57 MG/DL (ref 0.7–1.3)
EOSINOPHIL # BLD: 0.1 K/UL (ref 0–0.4)
EOSINOPHIL NFR BLD: 2 % (ref 0–7)
EPITH CASTS URNS QL MICRO: ABNORMAL /LPF
ERYTHROCYTE [DISTWIDTH] IN BLOOD BY AUTOMATED COUNT: 20 % (ref 11.5–14.5)
GLOBULIN SER CALC-MCNC: 3.6 G/DL (ref 2–4)
GLUCOSE SERPL-MCNC: 132 MG/DL (ref 65–100)
GLUCOSE UR STRIP.AUTO-MCNC: NEGATIVE MG/DL
HCT VFR BLD AUTO: 25 % (ref 36.6–50.3)
HGB BLD-MCNC: 7.7 G/DL (ref 12.1–17)
HGB UR QL STRIP: ABNORMAL
HYALINE CASTS URNS QL MICRO: ABNORMAL /LPF (ref 0–5)
KETONES UR QL STRIP.AUTO: NEGATIVE MG/DL
LEUKOCYTE ESTERASE UR QL STRIP.AUTO: ABNORMAL
LYMPHOCYTES # BLD AUTO: 21 % (ref 12–49)
LYMPHOCYTES # BLD: 1.4 K/UL (ref 0.8–3.5)
MCH RBC QN AUTO: 29.6 PG (ref 26–34)
MCHC RBC AUTO-ENTMCNC: 30.8 G/DL (ref 30–36.5)
MCV RBC AUTO: 96.2 FL (ref 80–99)
MONOCYTES # BLD: 0.6 K/UL (ref 0–1)
MONOCYTES NFR BLD AUTO: 8 % (ref 5–13)
NEUTS SEG # BLD: 4.5 K/UL (ref 1.8–8)
NEUTS SEG NFR BLD AUTO: 67 % (ref 32–75)
NITRITE UR QL STRIP.AUTO: NEGATIVE
PH UR STRIP: 5.5 [PH] (ref 5–8)
PLATELET # BLD AUTO: 355 K/UL (ref 150–400)
POTASSIUM SERPL-SCNC: 5 MMOL/L (ref 3.5–5.1)
PROT SERPL-MCNC: 6.3 G/DL (ref 6.4–8.2)
PROT UR STRIP-MCNC: NEGATIVE MG/DL
RBC # BLD AUTO: 2.6 M/UL (ref 4.1–5.7)
RBC #/AREA URNS HPF: ABNORMAL /HPF (ref 0–5)
SODIUM SERPL-SCNC: 146 MMOL/L (ref 136–145)
SP GR UR REFRACTOMETRY: 1.01 (ref 1–1.03)
UA: UC IF INDICATED,UAUC: ABNORMAL
UROBILINOGEN UR QL STRIP.AUTO: 0.2 EU/DL (ref 0.2–1)
WBC # BLD AUTO: 6.7 K/UL (ref 4.1–11.1)
WBC URNS QL MICRO: >100 /HPF (ref 0–4)

## 2017-07-22 PROCEDURE — 99285 EMERGENCY DEPT VISIT HI MDM: CPT

## 2017-07-22 PROCEDURE — 93970 EXTREMITY STUDY: CPT

## 2017-07-22 PROCEDURE — 74011250637 HC RX REV CODE- 250/637: Performed by: HOSPITALIST

## 2017-07-22 PROCEDURE — 87086 URINE CULTURE/COLONY COUNT: CPT | Performed by: STUDENT IN AN ORGANIZED HEALTH CARE EDUCATION/TRAINING PROGRAM

## 2017-07-22 PROCEDURE — 71010 XR CHEST PORT: CPT

## 2017-07-22 PROCEDURE — 80053 COMPREHEN METABOLIC PANEL: CPT | Performed by: STUDENT IN AN ORGANIZED HEALTH CARE EDUCATION/TRAINING PROGRAM

## 2017-07-22 PROCEDURE — 87186 SC STD MICRODIL/AGAR DIL: CPT | Performed by: STUDENT IN AN ORGANIZED HEALTH CARE EDUCATION/TRAINING PROGRAM

## 2017-07-22 PROCEDURE — 65270000032 HC RM SEMIPRIVATE

## 2017-07-22 PROCEDURE — 77030005514 HC CATH URETH FOL14 BARD -A

## 2017-07-22 PROCEDURE — 36415 COLL VENOUS BLD VENIPUNCTURE: CPT | Performed by: STUDENT IN AN ORGANIZED HEALTH CARE EDUCATION/TRAINING PROGRAM

## 2017-07-22 PROCEDURE — 86923 COMPATIBILITY TEST ELECTRIC: CPT | Performed by: STUDENT IN AN ORGANIZED HEALTH CARE EDUCATION/TRAINING PROGRAM

## 2017-07-22 PROCEDURE — 87077 CULTURE AEROBIC IDENTIFY: CPT | Performed by: STUDENT IN AN ORGANIZED HEALTH CARE EDUCATION/TRAINING PROGRAM

## 2017-07-22 PROCEDURE — 81001 URINALYSIS AUTO W/SCOPE: CPT | Performed by: STUDENT IN AN ORGANIZED HEALTH CARE EDUCATION/TRAINING PROGRAM

## 2017-07-22 PROCEDURE — 86900 BLOOD TYPING SEROLOGIC ABO: CPT | Performed by: STUDENT IN AN ORGANIZED HEALTH CARE EDUCATION/TRAINING PROGRAM

## 2017-07-22 PROCEDURE — 51702 INSERT TEMP BLADDER CATH: CPT

## 2017-07-22 PROCEDURE — 85025 COMPLETE CBC W/AUTO DIFF WBC: CPT | Performed by: STUDENT IN AN ORGANIZED HEALTH CARE EDUCATION/TRAINING PROGRAM

## 2017-07-22 RX ORDER — MENTHOL AND ZINC OXIDE .44; 20.625 G/100G; G/100G
OINTMENT TOPICAL 2 TIMES DAILY
COMMUNITY

## 2017-07-22 RX ORDER — METOPROLOL TARTRATE 25 MG/1
12.5 TABLET, FILM COATED ORAL 2 TIMES DAILY
Status: DISCONTINUED | OUTPATIENT
Start: 2017-07-22 | End: 2017-07-25 | Stop reason: HOSPADM

## 2017-07-22 RX ORDER — SODIUM CHLORIDE 0.9 % (FLUSH) 0.9 %
5-10 SYRINGE (ML) INJECTION EVERY 8 HOURS
Status: DISCONTINUED | OUTPATIENT
Start: 2017-07-22 | End: 2017-07-25 | Stop reason: HOSPADM

## 2017-07-22 RX ORDER — AMOXICILLIN 250 MG
1 CAPSULE ORAL 2 TIMES DAILY
Status: DISCONTINUED | OUTPATIENT
Start: 2017-07-22 | End: 2017-07-25 | Stop reason: HOSPADM

## 2017-07-22 RX ORDER — ONDANSETRON 2 MG/ML
4 INJECTION INTRAMUSCULAR; INTRAVENOUS
Status: DISCONTINUED | OUTPATIENT
Start: 2017-07-22 | End: 2017-07-25 | Stop reason: HOSPADM

## 2017-07-22 RX ORDER — SODIUM CHLORIDE 0.9 % (FLUSH) 0.9 %
5-10 SYRINGE (ML) INJECTION AS NEEDED
Status: DISCONTINUED | OUTPATIENT
Start: 2017-07-22 | End: 2017-07-25 | Stop reason: HOSPADM

## 2017-07-22 RX ORDER — ACETAMINOPHEN 325 MG/1
650 TABLET ORAL
Status: DISCONTINUED | OUTPATIENT
Start: 2017-07-22 | End: 2017-07-25 | Stop reason: HOSPADM

## 2017-07-22 RX ORDER — ACETAMINOPHEN 500 MG
500 TABLET ORAL 4 TIMES DAILY
Status: DISCONTINUED | OUTPATIENT
Start: 2017-07-22 | End: 2017-07-25 | Stop reason: HOSPADM

## 2017-07-22 RX ORDER — PANTOPRAZOLE SODIUM 40 MG/1
40 TABLET, DELAYED RELEASE ORAL
Status: DISCONTINUED | OUTPATIENT
Start: 2017-07-22 | End: 2017-07-25 | Stop reason: HOSPADM

## 2017-07-22 RX ORDER — FLUTICASONE PROPIONATE 50 MCG
2 SPRAY, SUSPENSION (ML) NASAL DAILY
Status: DISCONTINUED | OUTPATIENT
Start: 2017-07-23 | End: 2017-07-25 | Stop reason: HOSPADM

## 2017-07-22 RX ORDER — ROSUVASTATIN CALCIUM 10 MG/1
20 TABLET, COATED ORAL
Status: DISCONTINUED | OUTPATIENT
Start: 2017-07-22 | End: 2017-07-25 | Stop reason: HOSPADM

## 2017-07-22 RX ORDER — DOCUSATE SODIUM 100 MG/1
100 CAPSULE, LIQUID FILLED ORAL DAILY
Status: DISCONTINUED | OUTPATIENT
Start: 2017-07-23 | End: 2017-07-25 | Stop reason: HOSPADM

## 2017-07-22 RX ORDER — NYSTATIN 100000 U/G
CREAM TOPICAL 3 TIMES DAILY
COMMUNITY

## 2017-07-22 RX ORDER — FUROSEMIDE 10 MG/ML
40 INJECTION INTRAMUSCULAR; INTRAVENOUS DAILY
Status: DISCONTINUED | OUTPATIENT
Start: 2017-07-23 | End: 2017-07-25 | Stop reason: HOSPADM

## 2017-07-22 RX ADMIN — METOPROLOL TARTRATE 12.5 MG: 25 TABLET ORAL at 18:45

## 2017-07-22 RX ADMIN — DOCUSATE SODIUM AND SENNOSIDES 1 TABLET: 8.6; 5 TABLET, FILM COATED ORAL at 18:45

## 2017-07-22 RX ADMIN — ACETAMINOPHEN 500 MG: 500 TABLET, FILM COATED ORAL at 22:21

## 2017-07-22 RX ADMIN — Medication 10 ML: at 22:21

## 2017-07-22 RX ADMIN — Medication 10 ML: at 18:51

## 2017-07-22 RX ADMIN — ACETAMINOPHEN 500 MG: 500 TABLET, FILM COATED ORAL at 18:45

## 2017-07-22 RX ADMIN — PANTOPRAZOLE SODIUM 40 MG: 40 TABLET, DELAYED RELEASE ORAL at 18:45

## 2017-07-22 RX ADMIN — ROSUVASTATIN CALCIUM 20 MG: 10 TABLET ORAL at 22:21

## 2017-07-22 NOTE — PROGRESS NOTES
Admission Medication Reconciliation:    Information obtained from: Medication list from [facility] and Rx Query    Significant PMH/Disease States:   Past Medical History:   Diagnosis Date    Arrhythmia     RAPID HEARTBEAT    Arthritis     Constipation     Hearing loss     Heart disease     Hypercholesteremia     Hypertension     Joint pain     Memory disorder        Chief Complaint for this Admission:    Chief Complaint   Patient presents with    Swelling    Abnormal Lab Results       Allergies:  Ciprofloxacin and Nsaids (non-steroidal anti-inflammatory drug)    Prior to Admission Medications:   Prior to Admission Medications   Prescriptions Last Dose Informant Patient Reported? Taking? Menthol-Zinc Oxide (RISAMINE) 0.44-20.6 % oint   Yes Yes   Sig: Apply  to affected area as needed (skin irritation). In addition to scheduled regimen. Indications: DIAPER RASH, SKIN IRRITATION   Menthol-Zinc Oxide (RISAMINE) 0.44-20.6 % oint   Yes Yes   Sig: Apply  to affected area two (2) times a day. Apply to buttocks. SENEXON-S 8.6-50 mg per tablet   No Yes   Sig: TAKE 1 TABLET BY MOUTH TWICE DAILY AT 9A AND 9P DX:CONSTIPATION *STOP LAXATIVES/STOOL SOFTNERS IF > 2BM IN 1 DAY   acetaminophen (TYLENOL) 325 mg tablet   Yes Yes   Sig: Take 650 mg by mouth every eight (8) hours as needed for Pain. acetaminophen (TYLENOL) 500 mg tablet   No Yes   Sig: TAKE 1 TABLET BY MOUTH 4 TIMES DAILY (EVERY 6 HOURS) FOR PAIN   docusate sodium (COLACE) 100 mg capsule   Yes Yes   Sig: Take 100 mg by mouth daily. fluticasone (FLONASE) 50 mcg/actuation nasal spray   Yes Yes   Si Sprays by Both Nostrils route daily. metoprolol (LOPRESSOR) 25 mg tablet   Yes Yes   Sig: Take 12.5 mg by mouth two (2) times a day. nystatin (MYCOSTATIN) topical cream   Yes Yes   Sig: Apply  to affected area three (3) times daily.  Apply to groin and sacrum every shift (7-3, 3-11, 11-7)   pantoprazole (PROTONIX) 40 mg tablet   No Yes   Sig: Take 1 tablet by mouth Before breakfast and dinner. rosuvastatin (CRESTOR) 20 mg tablet   Yes Yes   Sig: Take 20 mg by mouth nightly. Facility-Administered Medications: None         Comments/Recommendations:     Obtained medication list from [facility] to update allergies and PTA medication list. Attempted to reach facility to clarify additional medications listed in Rx Query, however was unable to reach at this time. 1) Added Cipro and NSAIDs to allergy list (reactions unknown) per facility paperwork. 2) Updated PTA medication list. Added scheduled Risamine ointment and Nystatin. Removed clobetasol, ferrous sulfate, and triazolam.     In addition to medications listed on medication list, Rx Query showed recent fills for Cipro 250 mg BID x 7 days filled 7/14/17 (unable to reach facility to confirm if still taking; noticed Cipro allergy recorded on facility paperwork) and furosemide 20 mg daily filled 7/19/17 for #30 tablets (unable to reach facility to confirm).       Sergey Mccormack, LaurenD

## 2017-07-22 NOTE — ED NOTES
TRANSFER - OUT REPORT:    Verbal report given to Eason Ivana RN(name) on Robert Reap  being transferred to 207(unit) for routine progression of care       Report consisted of patients Situation, Background, Assessment and   Recommendations(SBAR). Information from the following report(s) SBAR, ED Summary, [facility] and Recent Results was reviewed with the receiving nurse. Lines:   Peripheral IV 07/22/17 Right Forearm (Active)   Site Assessment Clean, dry, & intact 7/22/2017  1:32 PM   Phlebitis Assessment 0 7/22/2017  1:32 PM   Infiltration Assessment 0 7/22/2017  1:32 PM   Dressing Status Clean, dry, & intact 7/22/2017  1:32 PM   Hub Color/Line Status Pink 7/22/2017  1:32 PM        Opportunity for questions and clarification was provided.

## 2017-07-22 NOTE — H&P
[address]   [address], [address]   HISTORY AND PHYSICAL       Name:  Clifford Mcdonald   MR#:  [id number]   :  1935   Account #:  [de-identified]        Date of Adm:  2017       ADMITTING ATTENDING: Luis Enrique Ramírez MD.    PRIMARY CARE PHYSICIAN: Trinh Sullivan. CHIEF COMPLAINT: Bilateral leg swelling. HISTORY OF PRESENT ILLNESS: This is an 78-year-old male with   a past medical history of chronic kidney disease stage III/IV, chronic   lymphedema or bilateral lower extremities at baseline, anemia of   chronic disease, and hypertension, who comes over here because of   worsening bilateral lower extremity swelling. The patient currently lives   at [facility]. He had blood work done yesterday, which showed a   hemoglobin level of 7.5, a creatinine of 2.37. It seems that his baseline   creatinine level is around 1.5 and that is why the patient comes over   here. As far as his anemia goes, it seemed that his baseline hemoglobin   level is between 7.5 to 8. Even on his last admission, most of the time   his hemoglobin level was close to around that, but I am not sure why   the patient's hemoglobin is at that level. Currently, the patient is   asymptomatic with that. Regarding bilateral lower extremity edema, the patient claims that he   has this swelling since last many years, but since the last 1 month, it   has progressively gotten worse. He claims that he has a history of   chronic lymphedema (I noted that from his last discharge summary),   but I do not think that the patient follows up with any   lymphedema clinic. He claims that he does not have any significant   amount of pain. No chest pain, shortness of breath, headache, dizziness, cough, fever,   nausea, vomiting, changes in bowel movements, no pain in the   abdomen. No burning sensation on urination. PAST MEDICAL HISTORY:   1. Chronic anemia. 2. Chronic lymphedema.    3. Recent hip fracture. 4. Hearing loss. 5. Hypertension. PAST SURGICAL HISTORY:   1. Back surgery. 2. Tonsillectomy. FAMILY HISTORY: Significant for stroke and lung cancer. SOCIAL HISTORY: Former smoker, quit in [address]. Nonalcoholic,   nondrug abuser. ALLERGIES: THE PATIENT IS ALLERGIC TO CIPROFLOXACIN   AND NSAIDS. HOME MEDICATIONS:    1. Tylenol 650 mg q.6-8 hours p.r.n.    2. Tylenol 500 mg 4 times daily. 3. Colace 100 mg p.o. daily. 4. Flonase 50 mcg spray, 2 sprays both nostrils daily. 5. Metoprolol 12.5 mg p.o. b.i.d.    6. Nystatin to the affected area 3 times daily. 7. Protonix 40 mg p.o. b.i.d.    8. Crestor 20 mg p.o. at bedtime. 9. Senna 1 tablet p.o. b.i.d. PHYSICAL EXAMINATION:    AT THE TIME WITH THE PATIENT SEEN, THE PATIENT'S VITALS   WERE AS FOLLOWS: Blood pressure 105/75, pulse 81, respiratory   rate 18, saturating 93% on room air. GENERAL: Not in acute distress, comfortable lying in bed. HEENT: Head normocephalic, atraumatic. EYES: PERRLA, EOMI. EARS: Bilateral hearing normal, no growth. NOSE: No polyps, no bleeding. MOUTH: Moist mucous membranes. Decent oral hygiene. NECK: Supple, no JVD, no thyromegaly. RESPIRATORY: Clear to auscultation bilaterally. No adventitious   breath sounds. CARDIOVASCULAR: S1, S2 normal. No murmurs, rubs, or gallops. GASTROINTESTINAL: Bowel sounds present. Soft, nontender,   nondistended. NEUROLOGICAL: Cranial nerves 2 through 12 intact. Motor 5/5   bilaterally, upper limb and lower limbs. Sensory normal.   PSYCHIATRIC: Mood and affect appropriate. Alert, awake, oriented   x3. EXTREMITIES:  Bilateral lower extremities, 2+ pedal edema  was   not getting really able to appreciate any edema, but there is   tenderness throughout the legs. LABS AND RADIOLOGICAL RESULTS:  WBC 6.7, hemoglobin 7.7,   , and a platelet count of 724.  Urinalysis shows cloudy urine with   negative nitrites and large leukocyte esterase, WBC greater than 100,   bacteria negative. Sodium 146, potassium 5.0, chloride 117,   bicarbonate 24, glucose 132, BUN , creatinine 2.57. The patient's   baseline seems to be close to around 1.5 or 1.6. CHEST X-RAY: I reviewed the chest x-ray independently by myself. It does not show any acute disease. I also reviewed the patient's echo   that was done in November 2016, which showed an ejection fraction of   55% to 60% and which looks stable. ASSESSMENT AND PLAN:   This is an 59-year-old male with a past medical history of chronic   lymphedema, hypertension, hypercholesterolemia, and chronic   anemia, comes over here with worsening of his bilateral leg swelling   and is found to have worsening renal function as well. 1.  acute kidney injury, probably secondary to third spacing. The   patient recently had a normal echo. I would not do any extensive   workup on that. I would consult Nephrology regarding the same,   especially when we will be giving diuretics for his bilateral lower   extremity edema. 2. Bilateral lower extremity edema, with a history of chronic   lymphedema. I would do Dopplers on the patient to rule out the   possibility of a deep venous thrombosis, especially in light of the fact   that the patient is wheelchair bound most of the time. I would also put   the patient on Lasix. The attending tomorrow has to reevaluate the   patient and see if the patient continues to need Lasix. 3. Hypertension. I will continue the patient's home medications. 4. Chronic anemia. Currently, I do not think that there is any need for   any blood transfusion, would not transfuse the patient for now. 5. Probable urinary tract infection in a patient with a Busby catheter. I   do not think that the patient needs to be treated. He does not have any   urinary symptoms. When I asked the ER physician the urinalysis done,   even he was not aware and mentioned that it might have been ordered   by the nurse.     6. Hypercholesterolemia. We will continue the patient's statin. 7.  The patient is on Protonix b.i.d. Currently, I am not sure why the   patient is on Protonix b.i.d. This might need to be re-addressed by the   primary care physician once he gets discharged. 8. The patient wants to be FULL CODE. I spent about 40 minutes in taking care of the patient.         Lucinda Myers MD      PG / YEYO   D:  07/22/2017   15:55   T:  07/22/2017   17:30   Job #:  [id number]

## 2017-07-22 NOTE — PROCEDURES
[facility]  *** FINAL REPORT ***    Name: Emperatriz Miranda  MRN: [id number]    Inpatient  : 13 Sep 1935  HIS Order #: [id number]  [address] [facility] Visit #: [id number]  Date: 2017    TYPE OF TEST: Peripheral Venous Testing    REASON FOR TEST  Pain in limb, Limb swelling    Right Leg:-  Deep venous thrombosis:           No  Superficial venous thrombosis:    No  Deep venous insufficiency:        Not examined  Superficial venous insufficiency: Not examined    Left Leg:-  Deep venous thrombosis:           No  Superficial venous thrombosis:    No  Deep venous insufficiency:        Not examined  Superficial venous insufficiency: Not examined      INTERPRETATION/FINDINGS  PROCEDURE:  Color duplex ultrasound imaging of lower extremity veins. FINDINGS:       Right: The common femoral, deep femoral, femoral, popliteal,  posterior tibial and great saphenous are patent and without evidence  of thrombus;  each is fully compressible and there is no narrowing of  the flow channel on color Doppler imaging. There is limited  visualization of ther posterior tibial vein. The peroneal vein was  not seen. Phasic flow is observed in the common femoral vein. Left:   The common femoral, deep femoral, femoral, popliteal,  posterior tibial, peroneal, and great saphenous are patent and without   evidence of thrombus;  each is fully compressible and there is no  narrowing of the flow channel on color Doppler imaging. There is  limited visualization of the calf veins secondary to swelling. Phasic   flow is observed in the common femoral vein. IMPRESSION:  No evidence of right or left lower extremity vein  thrombosis where visualized. ADDITIONAL COMMENTS    I have personally reviewed the data relevant to the interpretation of  this  study.     TECHNOLOGIST: David Jara RVT  Signed: 2017 05:26 PM    PHYSICIAN: Joseph John MD  Signed: 2017 08:55 AM

## 2017-07-22 NOTE — ED PROVIDER NOTES
HPI Comments: [de-identified] y.o. male with past medical history significant for HTN, heart disease, hypercholesteremia who presents from [facility] via EMS with chief complaint of leg swelling. Patient complains of b/l leg swelling, stating that his legs have been swollen for some time. Patient had blood work collected yesterday and today his Hgb was 7.5, creatinine 2.37. Patient denies any recent falls. There are no other acute medical concerns at this time. Social hx: former smoker, no alcohol  PCP: Tonya Brown MD    Per chart review: Patient was admitted on 4/1/17 for leg swelling and a low hemoglobin. Patient dx with anemia of chronic disease and chronic lymphedema. Note written by Isabel Mauricio, as dictated by Richard Jason MD 2:00 PM     The history is provided by the patient. No  was used. Past Medical History:   Diagnosis Date    Arrhythmia     RAPID HEARTBEAT    Arthritis     Constipation     Hearing loss     Heart disease     Hypercholesteremia     Hypertension     Joint pain     Memory disorder        Past Surgical History:   Procedure Laterality Date    HX BACK SURGERY  1974    HX CATARACT REMOVAL      LEFT    HX COLONOSCOPY      HX HERNIA REPAIR  1975    double    HX TONSILLECTOMY      UPPER GI ENDOSCOPY,BIOPSY  3/22/2013              Family History:   Problem Relation Age of Onset    Stroke Mother     Heart Disease Father     Cancer Brother      lung    Other Brother      NON ALCOHOLIC CIRRHOSIS       Social History     Social History    Marital status:      Spouse name: N/A    Number of children: N/A    Years of education: N/A     Occupational History    Not on file.      Social History Main Topics    Smoking status: Former Smoker     Years: 16.00     Quit date: 1/23/1971    Smokeless tobacco: Not on file    Alcohol use No    Drug use: No    Sexual activity: Not on file     Other Topics Concern    Not on file     Social History Narrative         ALLERGIES: Review of patient's allergies indicates no known allergies. Review of Systems   Constitutional: Negative for appetite change, chills, fatigue and fever. HENT: Negative for congestion, rhinorrhea and sore throat. Respiratory: Negative for cough and shortness of breath. Cardiovascular: Positive for leg swelling. Negative for chest pain. Gastrointestinal: Negative for abdominal pain, constipation, nausea and vomiting. Genitourinary: Negative for difficulty urinating and dysuria. Musculoskeletal: Negative for back pain and neck pain. Skin: Negative for rash and wound. Neurological: Negative for headaches. All other systems reviewed and are negative. Vitals:    07/22/17 1314 07/22/17 1330 07/22/17 1345   BP: 118/71 129/53 105/75   Pulse: 81     Resp: 18     Temp: 97.9 °F (36.6 °C)     SpO2: 100% 100% 99%   Weight: 80.5 kg (177 lb 6.4 oz)     Height: 5' 7\" (1.702 m)              Physical Exam   Constitutional: He is oriented to person, place, and time. He appears well-developed. He appears ill (chronically). No distress. HENT:   Head: Normocephalic and atraumatic. Eyes: Conjunctivae and EOM are normal.   Neck: Normal range of motion. Neck supple. Cardiovascular: Normal rate, regular rhythm and normal heart sounds. No murmur heard. Pulmonary/Chest: Effort normal and breath sounds normal. No respiratory distress. Abdominal: Soft. Bowel sounds are normal. He exhibits no distension. There is no tenderness. There is no rebound. Musculoskeletal: Normal range of motion. He exhibits edema. He exhibits no tenderness. 3+ pitting edema up to the waist b/l   Neurological: He is alert and oriented to person, place, and time. No cranial nerve deficit. He exhibits normal muscle tone. Coordination normal.   Skin: Skin is warm and dry. There is pallor. Nursing note and vitals reviewed.   Note written by Isabel Porter, as dictated by Abner Layton MD 2:02 PM      [facility]  ED Course       Procedures    CONSULT NOTE:  3:18 PM Lula Casillas MD spoke with Dr. Gila Ventura, Consult for Hospitalist.  Discussed available diagnostic tests and clinical findings. He is in agreement with care plans as outlined. Dr. Gila Ventura will admit for anemia and JAMES.

## 2017-07-23 LAB
ANION GAP BLD CALC-SCNC: 8 MMOL/L (ref 5–15)
BASOPHILS # BLD AUTO: 0.1 K/UL (ref 0–0.1)
BASOPHILS # BLD: 1 % (ref 0–1)
BUN SERPL-MCNC: 59 MG/DL (ref 6–20)
BUN/CREAT SERPL: 26 (ref 12–20)
CALCIUM SERPL-MCNC: 7.6 MG/DL (ref 8.5–10.1)
CHLORIDE SERPL-SCNC: 115 MMOL/L (ref 97–108)
CO2 SERPL-SCNC: 22 MMOL/L (ref 21–32)
CREAT SERPL-MCNC: 2.28 MG/DL (ref 0.7–1.3)
DIFFERENTIAL METHOD BLD: ABNORMAL
EOSINOPHIL # BLD: 0.1 K/UL (ref 0–0.4)
EOSINOPHIL NFR BLD: 2 % (ref 0–7)
ERYTHROCYTE [DISTWIDTH] IN BLOOD BY AUTOMATED COUNT: 19.9 % (ref 11.5–14.5)
GLUCOSE SERPL-MCNC: 83 MG/DL (ref 65–100)
HCT VFR BLD AUTO: 21 % (ref 36.6–50.3)
HGB BLD-MCNC: 6.7 G/DL (ref 12.1–17)
LYMPHOCYTES # BLD AUTO: 16 % (ref 12–49)
LYMPHOCYTES # BLD: 1 K/UL (ref 0.8–3.5)
MCH RBC QN AUTO: 29.9 PG (ref 26–34)
MCHC RBC AUTO-ENTMCNC: 31.9 G/DL (ref 30–36.5)
MCV RBC AUTO: 93.8 FL (ref 80–99)
MONOCYTES # BLD: 0.5 K/UL (ref 0–1)
MONOCYTES NFR BLD AUTO: 8 % (ref 5–13)
NEUTS BAND NFR BLD MANUAL: 1 % (ref 0–6)
NEUTS SEG # BLD: 4.8 K/UL (ref 1.8–8)
NEUTS SEG NFR BLD AUTO: 72 % (ref 32–75)
NRBC # BLD: 0.03 K/UL (ref 0–0.01)
NRBC BLD-RTO: 0.4 PER 100 WBC
PLATELET # BLD AUTO: 304 K/UL (ref 150–400)
PLATELET COMMENTS,PCOM: ABNORMAL
POTASSIUM SERPL-SCNC: 4.8 MMOL/L (ref 3.5–5.1)
RBC # BLD AUTO: 2.24 M/UL (ref 4.1–5.7)
RBC MORPH BLD: ABNORMAL
SODIUM SERPL-SCNC: 145 MMOL/L (ref 136–145)
WBC # BLD AUTO: 6.5 K/UL (ref 4.1–11.1)
WBC NRBC COR # BLD: ABNORMAL 10*3/UL

## 2017-07-23 PROCEDURE — 30233N1 TRANSFUSION OF NONAUTOLOGOUS RED BLOOD CELLS INTO PERIPHERAL VEIN, PERCUTANEOUS APPROACH: ICD-10-PCS | Performed by: INTERNAL MEDICINE

## 2017-07-23 PROCEDURE — 36430 TRANSFUSION BLD/BLD COMPNT: CPT

## 2017-07-23 PROCEDURE — P9016 RBC LEUKOCYTES REDUCED: HCPCS | Performed by: STUDENT IN AN ORGANIZED HEALTH CARE EDUCATION/TRAINING PROGRAM

## 2017-07-23 PROCEDURE — 85025 COMPLETE CBC W/AUTO DIFF WBC: CPT | Performed by: HOSPITALIST

## 2017-07-23 PROCEDURE — 65270000032 HC RM SEMIPRIVATE

## 2017-07-23 PROCEDURE — 74011250637 HC RX REV CODE- 250/637: Performed by: HOSPITALIST

## 2017-07-23 PROCEDURE — 36415 COLL VENOUS BLD VENIPUNCTURE: CPT | Performed by: HOSPITALIST

## 2017-07-23 PROCEDURE — 80048 BASIC METABOLIC PNL TOTAL CA: CPT | Performed by: HOSPITALIST

## 2017-07-23 PROCEDURE — 74011250636 HC RX REV CODE- 250/636: Performed by: HOSPITALIST

## 2017-07-23 RX ORDER — SODIUM CHLORIDE 9 MG/ML
250 INJECTION, SOLUTION INTRAVENOUS AS NEEDED
Status: DISCONTINUED | OUTPATIENT
Start: 2017-07-23 | End: 2017-07-25 | Stop reason: HOSPADM

## 2017-07-23 RX ADMIN — ACETAMINOPHEN 500 MG: 500 TABLET, FILM COATED ORAL at 13:50

## 2017-07-23 RX ADMIN — PANTOPRAZOLE SODIUM 40 MG: 40 TABLET, DELAYED RELEASE ORAL at 17:32

## 2017-07-23 RX ADMIN — METOPROLOL TARTRATE 12.5 MG: 25 TABLET ORAL at 17:32

## 2017-07-23 RX ADMIN — DOCUSATE SODIUM AND SENNOSIDES 1 TABLET: 8.6; 5 TABLET, FILM COATED ORAL at 17:32

## 2017-07-23 RX ADMIN — ROSUVASTATIN CALCIUM 20 MG: 10 TABLET ORAL at 22:06

## 2017-07-23 RX ADMIN — DOCUSATE SODIUM AND SENNOSIDES 1 TABLET: 8.6; 5 TABLET, FILM COATED ORAL at 10:41

## 2017-07-23 RX ADMIN — PANTOPRAZOLE SODIUM 40 MG: 40 TABLET, DELAYED RELEASE ORAL at 07:04

## 2017-07-23 RX ADMIN — Medication 10 ML: at 22:07

## 2017-07-23 RX ADMIN — DOCUSATE SODIUM 100 MG: 100 CAPSULE, LIQUID FILLED ORAL at 10:41

## 2017-07-23 RX ADMIN — METOPROLOL TARTRATE 12.5 MG: 25 TABLET ORAL at 10:41

## 2017-07-23 RX ADMIN — FLUTICASONE PROPIONATE 2 SPRAY: 50 SPRAY, METERED NASAL at 10:41

## 2017-07-23 RX ADMIN — Medication 10 ML: at 13:51

## 2017-07-23 RX ADMIN — ACETAMINOPHEN 500 MG: 500 TABLET, FILM COATED ORAL at 17:32

## 2017-07-23 RX ADMIN — FUROSEMIDE 40 MG: 10 INJECTION, SOLUTION INTRAMUSCULAR; INTRAVENOUS at 10:41

## 2017-07-23 RX ADMIN — Medication 10 ML: at 07:05

## 2017-07-23 RX ADMIN — ACETAMINOPHEN 500 MG: 500 TABLET, FILM COATED ORAL at 10:41

## 2017-07-23 RX ADMIN — ACETAMINOPHEN 500 MG: 500 TABLET, FILM COATED ORAL at 22:06

## 2017-07-23 NOTE — PROGRESS NOTES
0815 Bedside and Verbal shift change report given to [address] (oncoming nurse) by Giovanna Prescott RN (offgoing nurse). Report included the following information SBAR, Kardex, ED Summary, Procedure Summary, Intake/Output, MAR and Recent Results.

## 2017-07-23 NOTE — PROGRESS NOTES
Hospitalist Progress Note  Heidi Graff MD  Office: [phone]  Cell: [phone]      Date of Service:  2017  NAME:  Minesh Armendariz  :  1935  MRN:  [id number]      Admission Summary:   81-year-old male with a past medical history of chronic kidney disease stage III/IV, chronic   lymphedema or bilateral lower extremities at baseline, anemia of chronic disease, and hypertension, who comes over here because of worsening bilateral lower extremity swelling. The patient currently lives   at [facility]. Interval history / Subjective:     Asleep through exam     Assessment & Plan:     1. Acute kidney injury, probably secondary to third spacing. Improving, continue to monitor. Nephrology consulted  2. Bilateral lower extremity edema, with a history of chronic lymphedema. Dopplers negative for deep venous thrombosis. The patient is wheelchair bound. Now on Lasix. 3. Hypertension. I will continue the patient's home medications. 4. Chronic anemia. Hb down by 1g. Will transfuse 2 units PRBC. 5. Probable urinary tract infection in a patient with a Busby catheter. He does not have any   urinary symptoms, will hold abx for now. 6. Hypercholesterolemia. We will continue the patient's statin. Code status: Full  DVT prophylaxis: SCD    Care Plan discussed with: Patient/Family and Nurse  Disposition: TBD     Hospital Problems  Date Reviewed: 2017          Codes Class Noted POA    * (Principal)JAMES (acute kidney injury) ([facility] [address] ICD-10-CM: N17.9  ICD-9-CM: 584.9  2017 Yes                Review of Systems:   Review of systems not obtained due to patient factors. Vital Signs:    Last 24hrs VS reviewed since prior progress note.  Most recent are:  Visit Vitals    /55 (BP 1 Location: Left arm, BP Patient Position: At rest)    Pulse 87    Temp 98.6 °F (37 °C)    Resp 18    Ht 5' 7\" (1.702 m)    Wt 80.5 kg (177 lb 6.4 oz)    SpO2 93%    BMI 27.78 kg/m2         Intake/Output Summary (Last 24 hours) at 07/23/17 1105  Last data filed at 07/23/17 1055   Gross per 24 hour   Intake              960 ml   Output             4160 ml   Net            -3200 ml        Physical Examination:             Constitutional:  No acute distress, cooperative, pleasant    ENT:  Oral mucous moist, oropharynx benign. Neck supple,    Resp:  CTA bilaterally. No wheezing/rhonchi/rales. No accessory muscle use   CV:  Regular rhythm, normal rate, no murmurs, gallops, rubs    GI:  Soft, non distended, non tender. normoactive bowel sounds, no hepatosplenomegaly     Musculoskeletal:  2+ edema, warm, 2+ pulses throughout    Neurologic:             Data Review:    Review and/or order of clinical lab test      Labs:     Recent Labs      07/23/17   0355  07/22/17   1324   WBC  6.5  6.7   HGB  6.7*  7.7*   HCT  21.0*  25.0*   PLT  304  355     Recent Labs      07/23/17   0355  07/22/17   1324   NA  145  146*   K  4.8  5.0   CL  115*  117*   CO2  22  24   BUN  59*  61*   CREA  2.28*  2.57*   GLU  83  132*   CA  7.6*  7.6*     Recent Labs      07/22/17   1324   SGOT  21   ALT  17   AP  185*   TBILI  0.3   TP  6.3*   ALB  2.7*   GLOB  3.6     No results for input(s): INR, PTP, APTT in the last 72 hours. No lab exists for component: INREXT   No results for input(s): FE, TIBC, PSAT, FERR in the last 72 hours. Lab Results   Component Value Date/Time    Folate 8.4 01/23/2015 07:19 PM      No results for input(s): PH, PCO2, PO2 in the last 72 hours. No results for input(s): CPK, CKNDX, TROIQ in the last 72 hours.     No lab exists for component: CPKMB  No results found for: CHOL, CHOLX, CHLST, CHOLV, HDL, LDL, LDLC, DLDLP, TGLX, TRIGL, TRIGP, CHHD, CHHDX  Lab Results   Component Value Date/Time    Glucose (POC) 118 01/27/2015 12:15 PM    Glucose (POC) 117 01/27/2015 07:26 AM    Glucose (POC) 168 01/26/2015 09:03 PM    Glucose (POC) 146 01/26/2015 04:26 PM    Glucose (POC) 144 01/26/2015 11:27 AM     Lab Results   Component Value Date/Time    Color YELLOW/STRAW 07/22/2017 01:24 PM    Appearance CLOUDY 07/22/2017 01:24 PM    Specific gravity 1.010 07/22/2017 01:24 PM    pH (UA) 5.5 07/22/2017 01:24 PM    Protein NEGATIVE  07/22/2017 01:24 PM    Glucose NEGATIVE  07/22/2017 01:24 PM    Ketone NEGATIVE  07/22/2017 01:24 PM    Bilirubin NEGATIVE  07/22/2017 01:24 PM    Urobilinogen 0.2 07/22/2017 01:24 PM    Nitrites NEGATIVE  07/22/2017 01:24 PM    Leukocyte Esterase LARGE 07/22/2017 01:24 PM    Epithelial cells FEW 07/22/2017 01:24 PM    Bacteria NEGATIVE  07/22/2017 01:24 PM    WBC >100 07/22/2017 01:24 PM    RBC 0-5 07/22/2017 01:24 PM         Medications Reviewed:     Current Facility-Administered Medications   Medication Dose Route Frequency    metoprolol tartrate (LOPRESSOR) tablet 12.5 mg  12.5 mg Oral BID    rosuvastatin (CRESTOR) tablet 20 mg  20 mg Oral QHS    pantoprazole (PROTONIX) tablet 40 mg  40 mg Oral ACB&D    acetaminophen (TYLENOL) tablet 650 mg  650 mg Oral Q6H PRN    fluticasone (FLONASE) 50 mcg/actuation nasal spray 2 Spray  2 Spray Both Nostrils DAILY    docusate sodium (COLACE) capsule 100 mg  100 mg Oral DAILY    senna-docusate (PERICOLACE) 8.6-50 mg per tablet 1 Tab  1 Tab Oral BID    acetaminophen (TYLENOL) tablet 500 mg  500 mg Oral QID    sodium chloride (NS) flush 5-10 mL  5-10 mL IntraVENous Q8H    sodium chloride (NS) flush 5-10 mL  5-10 mL IntraVENous PRN    ondansetron (ZOFRAN) injection 4 mg  4 mg IntraVENous Q4H PRN    furosemide (LASIX) injection 40 mg  40 mg IntraVENous DAILY     ______________________________________________________________________  EXPECTED LENGTH OF STAY: - - -  ACTUAL LENGTH OF STAY:          1                 Will Amador MD

## 2017-07-23 NOTE — PROGRESS NOTES
Problem: Pressure Injury - Risk of  Goal: *Prevention of pressure ulcer  Outcome: Progressing Towards Goal    07/22/17 2114   Wound Prevention and Protection Methods   Orientation of Wound Prevention Posterior   Location of Wound Prevention Sacrum/Coccyx   Dressing Present  No   Read Only, Retired: Wound Treatment (non-mechanical)   Wound Offloading (Prevention Methods) Bed, pressure redistribution/air         Problem: Falls - Risk of  Goal: *Absence of falls  Outcome: Progressing Towards Goal  Patient call bell and belongings within reach. Bed in low and locked position.

## 2017-07-23 NOTE — CONSULTS
[address] [facility] [address]    [address]       Name:  Magdaleno Horner   MR#:  [id number]   :  1935   Account #:  [de-identified]    Date of Consultation:  2017   Date of Adm:  2017       REFERRING PHYSICIAN: Dr. Weston Candelario: Acute on chronic kidney injury. HISTORY OF PRESENT ILLNESS: The patient is an 23-year-old    man who was admitted with complaints of swelling of lower   extremities. He is a nursing home resident at [facility]. The   patient is a poor historian. He related again complaints of difficulties   ambulating and swelling of both extremities, but he could not provide   much of his history. The patient was seen in  by our service. At   that time, he had acute kidney injury with creatinine rising to 6. At that time, the patient did not require renal replacement therapy. His   kidney injury was related to rhabdomyolysis. His renal function   recovered completely. The patient never came for a followup visit at   our office. The patient had routine blood work done, which shows hemoglobin of   7.5, and creatinine of 2.37. It was noted that he has chronic anemia,   but his baseline hemoglobin is between 7.5 and 8. The patient states   that the swelling of his lower extremities was chronic for many years,   but it got worse for the last 1 month. Apparently he was diagnosed with   chronic lymphedema as well, but he does not follow up in the   lymphedema clinic. The patient denies any specific other complaints. PAST MEDICAL HISTORY: Chronic anemia, CKD, acute kidney injury,   hearing loss, hypertension, recent hip fracture, chronic lymphedema,   and general debility. PAST SURGICAL HISTORY: Back surgery, tonsillectomy. SOCIAL HISTORY: Former smoker. The patient lives in a nursing   home now. He does not use alcohol and illicit drugs.     FAMILY HISTORY: Positive for stroke and lung cancer. ALLERGIES: ALLERGIC TO   1. CIPRO. 2. NONSTEROIDAL ANTI-INFLAMMATORY. HOME MEDICATIONS: LIST CONSISTS OF   1. Tylenol 650 when needed. 2. Colace 100 once a day. 3. Flonase. 4. Metoprolol 12.5 b.i.d.   5. Nystatin. 6. Protonix 40 b.i.d.   7. Crestor 20.   8. Senna when needed. PHYSICAL EXAMINATION   GENERAL: An elderly white man who looks chronically ill. He is very   pale. He is somnolent but arousable. He is not in acute distress,   though. VITAL SIGNS: His blood pressure is 119/55, heart rate is 87,   temperature is 96.5. SKIN: Cool, pale, dry. EYES: With anicteric sclerae. Conjunctivae pale. Ears and nose   without abnormal discharge. Nose is with no bleeding. MOUTH: Moist oral mucosa. NECK: Supple with no increased JVP, carotid bruit or thyromegaly. LUNGS: Clear to auscultation with good air entry, bibasally. CARDIOVASCULAR: S1 and S2 with regular rate and rhythm. GI: With positive bowel sounds. NEUROLOGIC: The patient is awake and alert, but he is overall very   weak. EXTREMITIES: With bilateral edema and red rash over the legs   compatible with cellulitis. LABORATORY DATA: Sodium is 145, potassium is 4.8, CO2 is 22,   BUN is 59, creatinine is down to 2.28. The patient made 2700 mL of   urine. White blood count is 0.8, hemoglobin is 6.7. Chest x-ray does not show any acute process. IMPRESSION:   1. Acute on chronic kidney injury. The patient's GFR seems to be   improving. 2. Hypervolemia. The patient is in a negative fluid balance and he   seems to have some improvement in his renal function after diuretics   were applied. 3. Neutropenia and critical anemia. The patient most probably has a   primary bone marrow disease. RECOMMENDATIONS:   1. No need of extensive renal workup. The patient is nonoliguric. He   passes good light colored urine. His renal function is already   improving.    2. Hematology may be of help since the patient has not only anemia   but a severe neutropenia. I will order iron profile and supplement if   necessary. The patient may be a candidate for Procrit therapy. 3. Input and output to be recorded. 4. Screen for secondary hyperparathyroidism with PTH   and phosphorus level. 5. Avoid any nephrotoxic agents at this acute stage. Thank you very much for the opportunity to be part of this patient's   care.         Graeme Camarillo MD      LTD / RLD   D:  07/23/2017   11:33   T:  07/23/2017   16:13   Job #:  [id number]

## 2017-07-24 LAB
ABO + RH BLD: NORMAL
ALBUMIN SERPL BCP-MCNC: 2.2 G/DL (ref 3.5–5)
ANION GAP BLD CALC-SCNC: 7 MMOL/L (ref 5–15)
BACTERIA SPEC CULT: ABNORMAL
BACTERIA SPEC CULT: ABNORMAL
BLD PROD TYP BPU: NORMAL
BLD PROD TYP BPU: NORMAL
BLOOD GROUP ANTIBODIES SERPL: NORMAL
BPU ID: NORMAL
BPU ID: NORMAL
BUN SERPL-MCNC: 51 MG/DL (ref 6–20)
BUN/CREAT SERPL: 23 (ref 12–20)
CALCIUM SERPL-MCNC: 7.5 MG/DL (ref 8.5–10.1)
CALCIUM SERPL-MCNC: 7.7 MG/DL (ref 8.5–10.1)
CC UR VC: ABNORMAL
CHLORIDE SERPL-SCNC: 116 MMOL/L (ref 97–108)
CO2 SERPL-SCNC: 23 MMOL/L (ref 21–32)
CREAT SERPL-MCNC: 2.22 MG/DL (ref 0.7–1.3)
CROSSMATCH RESULT,%XM: NORMAL
CROSSMATCH RESULT,%XM: NORMAL
ERYTHROCYTE [DISTWIDTH] IN BLOOD BY AUTOMATED COUNT: 20.1 % (ref 11.5–14.5)
GLUCOSE SERPL-MCNC: 87 MG/DL (ref 65–100)
HCT VFR BLD AUTO: 30.2 % (ref 36.6–50.3)
HGB BLD-MCNC: 9.8 G/DL (ref 12.1–17)
IRON SATN MFR SERPL: 32 % (ref 20–50)
IRON SERPL-MCNC: 55 UG/DL (ref 35–150)
MCH RBC QN AUTO: 29.5 PG (ref 26–34)
MCHC RBC AUTO-ENTMCNC: 32.5 G/DL (ref 30–36.5)
MCV RBC AUTO: 91 FL (ref 80–99)
PHOSPHATE SERPL-MCNC: 3.7 MG/DL (ref 2.6–4.7)
PLATELET # BLD AUTO: 320 K/UL (ref 150–400)
POTASSIUM SERPL-SCNC: 4.8 MMOL/L (ref 3.5–5.1)
PTH-INTACT SERPL-MCNC: 152.7 PG/ML (ref 14–72)
RBC # BLD AUTO: 3.32 M/UL (ref 4.1–5.7)
SERVICE CMNT-IMP: ABNORMAL
SODIUM SERPL-SCNC: 146 MMOL/L (ref 136–145)
SPECIMEN EXP DATE BLD: NORMAL
STATUS OF UNIT,%ST: NORMAL
STATUS OF UNIT,%ST: NORMAL
TIBC SERPL-MCNC: 174 UG/DL (ref 250–450)
UNIT DIVISION, %UDIV: 0
UNIT DIVISION, %UDIV: 0
WBC # BLD AUTO: 9.7 K/UL (ref 4.1–11.1)

## 2017-07-24 PROCEDURE — 65270000032 HC RM SEMIPRIVATE

## 2017-07-24 PROCEDURE — 97530 THERAPEUTIC ACTIVITIES: CPT

## 2017-07-24 PROCEDURE — 83540 ASSAY OF IRON: CPT | Performed by: INTERNAL MEDICINE

## 2017-07-24 PROCEDURE — 85027 COMPLETE CBC AUTOMATED: CPT | Performed by: INTERNAL MEDICINE

## 2017-07-24 PROCEDURE — 36415 COLL VENOUS BLD VENIPUNCTURE: CPT | Performed by: INTERNAL MEDICINE

## 2017-07-24 PROCEDURE — 97161 PT EVAL LOW COMPLEX 20 MIN: CPT

## 2017-07-24 PROCEDURE — 83970 ASSAY OF PARATHORMONE: CPT | Performed by: INTERNAL MEDICINE

## 2017-07-24 PROCEDURE — G8979 MOBILITY GOAL STATUS: HCPCS

## 2017-07-24 PROCEDURE — 80069 RENAL FUNCTION PANEL: CPT | Performed by: INTERNAL MEDICINE

## 2017-07-24 PROCEDURE — 74011250636 HC RX REV CODE- 250/636: Performed by: INTERNAL MEDICINE

## 2017-07-24 PROCEDURE — 74011250636 HC RX REV CODE- 250/636: Performed by: HOSPITALIST

## 2017-07-24 PROCEDURE — G8978 MOBILITY CURRENT STATUS: HCPCS

## 2017-07-24 PROCEDURE — 74011250637 HC RX REV CODE- 250/637: Performed by: HOSPITALIST

## 2017-07-24 RX ADMIN — PANTOPRAZOLE SODIUM 40 MG: 40 TABLET, DELAYED RELEASE ORAL at 06:53

## 2017-07-24 RX ADMIN — Medication 10 ML: at 23:10

## 2017-07-24 RX ADMIN — ACETAMINOPHEN 500 MG: 500 TABLET, FILM COATED ORAL at 09:22

## 2017-07-24 RX ADMIN — DOCUSATE SODIUM AND SENNOSIDES 1 TABLET: 8.6; 5 TABLET, FILM COATED ORAL at 17:30

## 2017-07-24 RX ADMIN — FLUTICASONE PROPIONATE 2 SPRAY: 50 SPRAY, METERED NASAL at 09:23

## 2017-07-24 RX ADMIN — Medication 10 ML: at 13:28

## 2017-07-24 RX ADMIN — ACETAMINOPHEN 500 MG: 500 TABLET, FILM COATED ORAL at 12:15

## 2017-07-24 RX ADMIN — ROSUVASTATIN CALCIUM 20 MG: 10 TABLET ORAL at 23:10

## 2017-07-24 RX ADMIN — FUROSEMIDE 40 MG: 10 INJECTION, SOLUTION INTRAMUSCULAR; INTRAVENOUS at 09:21

## 2017-07-24 RX ADMIN — PANTOPRAZOLE SODIUM 40 MG: 40 TABLET, DELAYED RELEASE ORAL at 17:30

## 2017-07-24 RX ADMIN — Medication 10 ML: at 06:00

## 2017-07-24 RX ADMIN — ACETAMINOPHEN 500 MG: 500 TABLET, FILM COATED ORAL at 17:30

## 2017-07-24 RX ADMIN — EPOETIN ALFA 10000 UNITS: 10000 SOLUTION INTRAVENOUS; SUBCUTANEOUS at 12:15

## 2017-07-24 RX ADMIN — ACETAMINOPHEN 500 MG: 500 TABLET, FILM COATED ORAL at 23:10

## 2017-07-24 RX ADMIN — METOPROLOL TARTRATE 12.5 MG: 25 TABLET ORAL at 09:22

## 2017-07-24 RX ADMIN — METOPROLOL TARTRATE 12.5 MG: 25 TABLET ORAL at 17:31

## 2017-07-24 NOTE — PROGRESS NOTES
Bedside and Verbal shift change report given to [facility] NICOLÁS RN (oncoming nurse) by Matt Abreu RN (offgoing nurse). Report included the following information SBAR, Kardex, Intake/Output, Recent Results and Med Rec Status.

## 2017-07-24 NOTE — PROGRESS NOTES
Hospitalist Progress Note  Ginger Whitman MD  Office: [phone]  Cell: [phone]      Date of Service:  2017  NAME:  Jada Mejia  :  1935  MRN:  [id number]      Admission Summary:   73-year-old male with a past medical history of chronic kidney disease stage III/IV, chronic   lymphedema or bilateral lower extremities at baseline, anemia of chronic disease, and hypertension, who comes over here because of worsening bilateral lower extremity swelling. The patient currently lives   at [facility]. Interval history / Subjective:   Doing well , found asleep     Assessment & Plan:     1. Acute kidney injury, probably secondary to third spacing. Improving, continue to monitor. Nephrology consulted cr better close  to his baseline   2. Bilateral lower extremity edema, with a history of chronic lymphedema. Dopplers negative for deep venous thrombosis. The patient is wheelchair bound. Now on Lasix. ( could be causing worsening of renal functions ) neg 4 L  3. Hypertension. I will continue the patient's home medications. Fairly  controlled  4. Chronic anemia. Hb down by 1g. Will transfuse 2 units PRBC.- HGB 9.8     5. Probable urinary tract infection in a patient with a Busby catheter. He does not have any   urinary symptoms, will hold abx for now. 6. Hypercholesterolemia. We will continue the patient's statin. Code status: Full  DVT prophylaxis: SCD    Care Plan discussed with: Patient/Family and Nurse  Disposition: TBD     Hospital Problems  Date Reviewed: 2017          Codes Class Noted POA    * (Principal)JAMES (acute kidney injury) ([facility] [address] ICD-10-CM: N17.9  ICD-9-CM: 584.9  2017 Yes                Review of Systems:   Review of systems not obtained due to patient factors. Vital Signs:    Last 24hrs VS reviewed since prior progress note.  Most recent are:  Visit Vitals    /65 (BP Patient Position: At rest)    Pulse 88    Temp 98.6 °F (37 °C)    Resp 16    Ht 5' 7\" (1.702 m)    Wt 80.5 kg (177 lb 6.4 oz)    SpO2 92%    BMI 27.78 kg/m2         Intake/Output Summary (Last 24 hours) at 07/24/17 5146  Last data filed at 07/24/17 8494   Gross per 24 hour   Intake           1582.9 ml   Output             6550 ml   Net          -4967.1 ml        Physical Examination:             Constitutional:  No acute distress, cooperative, pleasant    ENT:  Oral mucous moist   Resp:  CTA bilaterally. CV:  Regular rhythm, normal rate, no murmurs, gallops, rubs    GI:  Soft, non distended, non tender. bs+    Musculoskeletal:  2+ edema, warm, 2+ pulses throughout    Neurologic:             Data Review:    Review and/or order of clinical lab test      Labs:     Recent Labs      07/24/17 0414 07/23/17 0355   WBC  9.7  6.5   HGB  9.8*  6.7*   HCT  30.2*  21.0*   PLT  320  304     Recent Labs      07/24/17 0414 07/23/17   0355  07/22/17   1324   NA  146*  145  146*   K  4.8  4.8  5.0   CL  116*  115*  117*   CO2  23  22  24   BUN  51*  59*  61*   CREA  2.22*  2.28*  2.57*   GLU  87  83  132*   CA  7.7*  7.5*  7.6*  7.6*   PHOS  3.7   --    --      Recent Labs      07/24/17 0414  07/22/17   1324   SGOT   --   21   ALT   --   17   AP   --   185*   TBILI   --   0.3   TP   --   6.3*   ALB  2.2*  2.7*   GLOB   --   3.6     No results for input(s): INR, PTP, APTT in the last 72 hours. No lab exists for component: INREXT, INREXT   Recent Labs      07/24/17 0414   TIBC  174*   PSAT  32      Lab Results   Component Value Date/Time    Folate 8.4 01/23/2015 07:19 PM      No results for input(s): PH, PCO2, PO2 in the last 72 hours. No results for input(s): CPK, CKNDX, TROIQ in the last 72 hours.     No lab exists for component: CPKMB  No results found for: CHOL, CHOLX, CHLST, CHOLV, HDL, LDL, LDLC, DLDLP, TGLX, TRIGL, TRIGP, CHHD, CHHDX  Lab Results   Component Value Date/Time    Glucose (POC) 118 01/27/2015 12:15 PM    Glucose (POC) 117 01/27/2015 07:26 AM    Glucose (POC) 168 01/26/2015 09:03 PM    Glucose (POC) 146 01/26/2015 04:26 PM    Glucose (POC) 144 01/26/2015 11:27 AM     Lab Results   Component Value Date/Time    Color YELLOW/STRAW 07/22/2017 01:24 PM    Appearance CLOUDY 07/22/2017 01:24 PM    Specific gravity 1.010 07/22/2017 01:24 PM    pH (UA) 5.5 07/22/2017 01:24 PM    Protein NEGATIVE  07/22/2017 01:24 PM    Glucose NEGATIVE  07/22/2017 01:24 PM    Ketone NEGATIVE  07/22/2017 01:24 PM    Bilirubin NEGATIVE  07/22/2017 01:24 PM    Urobilinogen 0.2 07/22/2017 01:24 PM    Nitrites NEGATIVE  07/22/2017 01:24 PM    Leukocyte Esterase LARGE 07/22/2017 01:24 PM    Epithelial cells FEW 07/22/2017 01:24 PM    Bacteria NEGATIVE  07/22/2017 01:24 PM    WBC >100 07/22/2017 01:24 PM    RBC 0-5 07/22/2017 01:24 PM         Medications Reviewed:     Current Facility-Administered Medications   Medication Dose Route Frequency    0.9% sodium chloride infusion 250 mL  250 mL IntraVENous PRN    metoprolol tartrate (LOPRESSOR) tablet 12.5 mg  12.5 mg Oral BID    rosuvastatin (CRESTOR) tablet 20 mg  20 mg Oral QHS    pantoprazole (PROTONIX) tablet 40 mg  40 mg Oral ACB&D    acetaminophen (TYLENOL) tablet 650 mg  650 mg Oral Q6H PRN    fluticasone (FLONASE) 50 mcg/actuation nasal spray 2 Spray  2 Spray Both Nostrils DAILY    docusate sodium (COLACE) capsule 100 mg  100 mg Oral DAILY    senna-docusate (PERICOLACE) 8.6-50 mg per tablet 1 Tab  1 Tab Oral BID    acetaminophen (TYLENOL) tablet 500 mg  500 mg Oral QID    sodium chloride (NS) flush 5-10 mL  5-10 mL IntraVENous Q8H    sodium chloride (NS) flush 5-10 mL  5-10 mL IntraVENous PRN    ondansetron (ZOFRAN) injection 4 mg  4 mg IntraVENous Q4H PRN    furosemide (LASIX) injection 40 mg  40 mg IntraVENous DAILY     ______________________________________________________________________  EXPECTED LENGTH OF STAY: - - -  ACTUAL LENGTH OF STAY:          2                 [place] MD Rachel

## 2017-07-24 NOTE — PROGRESS NOTES
Name: Negra Courtney MRN: [id number]   : 1935 [facility]   Date: 2017        IMPRESSION:   · CKD stage 3/4 as baseline. · JAMES- severe fluid overload with\" nephrosarca\"- improved with diuretics. · Hypervolemia- improved. · Critical anemia- Hb better after transfusion. PLAN:   · Continue present therapy. · Consider tapering of the diuretics. · Start anemia management. Subjective/Interval History:   I have reviewed the flowsheet and previous days notes. ROS:Review of systems not obtained due to patient factors. Objective:   Vital Signs:    Visit Vitals    /68 (BP Patient Position: At rest)    Pulse (!) 101    Temp 98.2 °F (36.8 °C)    Resp 16    Ht 5' 7\" (1.702 m)    Wt 80.5 kg (177 lb 6.4 oz)    SpO2 92%    BMI 27.78 kg/m2       O2 Device: Room air       Temp (24hrs), Av.6 °F (37 °C), Min:98.2 °F (36.8 °C), Max:99.2 °F (37.3 °C)       Intake/Output:   Last shift:       07 -  190  In: 240 [P.O.:240]  Out: 552 [Urine:550]  Last 3 shifts: 1901 -  0700  In: 1582.9 [P.O.:840]  Out: 9160 [Urine:9160]    Intake/Output Summary (Last 24 hours) at 17 1037  Last data filed at 17 0913   Gross per 24 hour   Intake           1342.9 ml   Output             7102 ml   Net          -5759.1 ml        Physical Exam:  General:    Asleep, pale, no distress, appears stated age. Head:   Normocephalic, without obvious abnormality, atraumatic. Eyes:   Closed. Nose:  Nares normal. No drainage or sinus tenderness. Throat:    Lips, mucosa, and tongue normal.  No Thrush  Neck:  Supple, symmetrical,  no adenopathy, thyroid: non tender    no carotid bruit and no JVD. Back:    Symmetric,  No CVA tenderness. Lungs:   Clear to auscultation bilaterally. No Wheezing or Rhonchi. No rales. Chest wall:  No tenderness or deformity. No Accessory muscle use. Heart:   Regular rate and rhythm,  no murmur, rub or gallop.   Abdomen:   Soft, non-tender. Not distended. Bowel sounds normal. No masses  Extremities: Extremities normal, atraumatic, No cyanosis. Less edema. No clubbing  Skin:     Texture, turgor normal. No rashes or lesions. Not Jaundiced        DATA:  Labs:  Recent Labs      07/24/17 0414 07/23/17   0355  07/22/17   1324   NA  146*  145  146*   K  4.8  4.8  5.0   CL  116*  115*  117*   CO2  23 22  24   BUN  51*  59*  61*   CREA  2.22*  2.28*  2.57*   CA  7.7*  7.5*  7.6*  7.6*   ALB  2.2*   --   2.7*   PHOS  3.7   --    --      Recent Labs      07/24/17 0414 07/23/17 0355 07/22/17   1324   WBC  9.7  6.5  6.7   HGB  9.8*  6.7*  7.7*   HCT  30.2*  21.0*  25.0*   PLT  320  304  355     No results for input(s): AMANDA, KU, CLU, CREAU in the last 72 hours.     No lab exists for component: PROU    Total time spent with patient:  35 minutes    [] Critical Care Provided    Care Plan discussed with:   Staff, Medical Team    Teresa Tee MD

## 2017-07-24 NOTE — PROGRESS NOTES
Reviewed chart. Met with pt this am. He resides at [facility] and his wife lives in independent living at facility. He states his PCP is Dr. Ivelisse De Leon. Note pt had been at [facility]. Family indicated to  [address] pt may need to return to SNF post hospitalization. Pt requested daughter be contacted to discuss disposition needs. Contacted daughter Jane Boys: [phone]). Spoke with daughter after talking with [facility] referral coordinator. Pt has used approx 62 days without 60 day break. Will plan transition to SNF at [facility] (pt's/family's choice) when stable. Referral sent. Will follow. Jarret Davidson LCSW           Care Management Interventions  PCP Verified by CM:  Yes (Dr. Ivelisse De Leon)  Mode of Transport at Discharge: BLS  Transition of Care Consult (CM Consult): SNF (possible need for SNF)  Partner SNF: Yes  Physical Therapy Consult: Yes  Occupational Therapy Consult: Yes  Speech Therapy Consult: No  Current Support Network: Assisted Living ([facility])  Confirm Follow Up Transport: Other (see comment)  Plan discussed with Pt/Family/Caregiver: Yes  Freedom of Choice Offered: Yes  Discharge Location  Discharge Placement: Rehab hospital/unit acute

## 2017-07-24 NOTE — PROGRESS NOTES
NUTRITION       Nutrition screening referral was triggered based on results obtained during nursing admission assessment. The patient's chart was reviewed and nutrition assessment is not indicated at this time. Plan to see patient for rescreen as indicated. Thank you.      Jah Brown RD

## 2017-07-24 NOTE — PROGRESS NOTES
Bedside and Verbal shift change report given to Maria Esther (oncoming nurse) by Matteo Jimenez (offgoing nurse). Report included the following information SBAR, Kardex, Intake/Output and MAR.

## 2017-07-24 NOTE — PROGRESS NOTES
Physical Therapy Screening:    [facility] based on results obtained during the nursing admission assessment. The patients chart was reviewed and the patient is appropriate for a skilled therapy evaluation if there is a decline in functional mobility from baseline. Please order a consult for physical therapy if you are in agreement and would like an evaluation to be completed. Thank you.     Pietro Li PT

## 2017-07-25 VITALS
TEMPERATURE: 98.3 F | DIASTOLIC BLOOD PRESSURE: 60 MMHG | HEART RATE: 78 BPM | WEIGHT: 177.4 LBS | BODY MASS INDEX: 27.84 KG/M2 | SYSTOLIC BLOOD PRESSURE: 124 MMHG | RESPIRATION RATE: 18 BRPM | OXYGEN SATURATION: 93 % | HEIGHT: 67 IN

## 2017-07-25 PROCEDURE — 74011250636 HC RX REV CODE- 250/636: Performed by: HOSPITALIST

## 2017-07-25 PROCEDURE — 74011250637 HC RX REV CODE- 250/637: Performed by: HOSPITALIST

## 2017-07-25 RX ORDER — WATER FOR INJECTION,STERILE
VIAL (ML) INJECTION
Status: DISCONTINUED
Start: 2017-07-25 | End: 2017-07-25 | Stop reason: HOSPADM

## 2017-07-25 RX ORDER — FUROSEMIDE 40 MG/1
TABLET ORAL
Qty: 60 TAB | Refills: 0 | Status: SHIPPED | OUTPATIENT
Start: 2017-07-25 | End: 2018-01-31

## 2017-07-25 RX ADMIN — PANTOPRAZOLE SODIUM 40 MG: 40 TABLET, DELAYED RELEASE ORAL at 07:04

## 2017-07-25 RX ADMIN — DOCUSATE SODIUM AND SENNOSIDES 1 TABLET: 8.6; 5 TABLET, FILM COATED ORAL at 08:44

## 2017-07-25 RX ADMIN — Medication 10 ML: at 05:41

## 2017-07-25 RX ADMIN — FUROSEMIDE 40 MG: 10 INJECTION, SOLUTION INTRAMUSCULAR; INTRAVENOUS at 08:44

## 2017-07-25 RX ADMIN — FLUTICASONE PROPIONATE 2 SPRAY: 50 SPRAY, METERED NASAL at 08:44

## 2017-07-25 RX ADMIN — DOCUSATE SODIUM 100 MG: 100 CAPSULE, LIQUID FILLED ORAL at 08:44

## 2017-07-25 RX ADMIN — Medication 10 ML: at 14:00

## 2017-07-25 RX ADMIN — ACETAMINOPHEN 500 MG: 500 TABLET, FILM COATED ORAL at 08:44

## 2017-07-25 RX ADMIN — METOPROLOL TARTRATE 12.5 MG: 25 TABLET ORAL at 08:44

## 2017-07-25 NOTE — PROGRESS NOTES
Conferred with hospitalist this am. Dae Nuñez this am. They have accepted pt for admission. Bed available later today. Contacted daughter who agrees with plan. After discussion re: transportation. With no preference, made arrangements for [facility] to transport at 4:00 pm today. Information to accompany pt:  Discharge instructions/kardex/MAR/summary.   (SNF: [facility])  [place]

## 2017-07-25 NOTE — ROUTINE PROCESS
Bedside shift change report given to jose guadalupe yeager rn (oncoming nurse) by Chauncey Chan (offgoing nurse). Report included the following information SBAR and Kardex.

## 2017-07-25 NOTE — PROGRESS NOTES
Hospitalist Progress Note  Elton Pichardo MD  Office: [phone]  Cell: [phone]      Date of Service:  2017  NAME:  Alicia Vee  :  1935  MRN:  [id number]      Admission Summary:   81-year-old male with a past medical history of chronic kidney disease stage III/IV, chronic   lymphedema or bilateral lower extremities at baseline, anemia of chronic disease, and hypertension, who comes over here because of worsening bilateral lower extremity swelling. The patient currently lives   at [facility]. Interval history / Subjective:   F/u bilateral pedal edema and JAMES     Assessment & Plan:     1. Acute kidney injury, probably secondary to third spacing. Improving, continue to monitor. Nephrology consulted cr better  -Spoke to Dr Horacio Puri, cleared the patient for discharge on Lasix 40 BID  -Outpatient follow up   -Outpatient repeat BMP in 3-4 days    2. Bilateral lower extremity edema, with a history of chronic lymphedema. Dopplers negative for deep venous thrombosis. The patient is wheelchair bound. Now on Lasix. ( could be causing worsening of renal functions ) neg 4 L    3. Hypertension. I will continue the patient's home medications. Fairly  Controlled    4. Acute on Chronic anemia  -?source  -s/p transfusion 2 units PRBC     5. Probable urinary tract infection in a patient with a Busby catheter.   -He does not have any urinary symptoms, will hold abx for now. 6. Hypercholesterolemia. We will continue the patient's statin. Cardiac diet    PT/OT SNF    Code status: Full  DVT prophylaxis: SCD  PTA: [facility]: Discharge to SNF today.  CM aware    Care Plan discussed with: Patient/Family and Nurse  Disposition: TBD     Hospital Problems  Date Reviewed: 2017          Codes Class Noted POA    * (Principal)JAMES (acute kidney injury) ([facility] [address] ICD-10-CM: N17.9  ICD-9-CM: 584.9  2017 Yes                Review of Systems:   Review of systems not obtained due to patient factors. Vital Signs:    Last 24hrs VS reviewed since prior progress note. Most recent are:  Visit Vitals    /69 (BP 1 Location: Left arm)    Pulse 85    Temp 98.5 °F (36.9 °C)    Resp 16    Ht 5' 7\" (1.702 m)    Wt 80.5 kg (177 lb 6.4 oz)    SpO2 92%    BMI 27.78 kg/m2         Intake/Output Summary (Last 24 hours) at 07/25/17 3476  Last data filed at 07/25/17 0100   Gross per 24 hour   Intake              480 ml   Output             5402 ml   Net            -4922 ml        Physical Examination:             Constitutional:  No acute distress, cooperative, pleasant    ENT:  Oral mucous moist   Resp:  CTA bilaterally. CV:  Regular rhythm, normal rate, no murmurs, gallops, rubs    GI:  Soft, non distended, non tender. bs+    Musculoskeletal:  2+ edema, warm, 2+ pulses throughout    Neurologic:             Data Review:    Review and/or order of clinical lab test      Labs:     Recent Labs      07/24/17 0414 07/23/17 0355   WBC  9.7  6.5   HGB  9.8*  6.7*   HCT  30.2*  21.0*   PLT  320  304     Recent Labs      07/24/17 0414  07/23/17   0355  07/22/17   1324   NA  146*  145  146*   K  4.8  4.8  5.0   CL  116*  115*  117*   CO2  23  22  24   BUN  51*  59*  61*   CREA  2.22*  2.28*  2.57*   GLU  87  83  132*   CA  7.7*  7.5*  7.6*  7.6*   PHOS  3.7   --    --      Recent Labs      07/24/17   0414  07/22/17   1324   SGOT   --   21   ALT   --   17   AP   --   185*   TBILI   --   0.3   TP   --   6.3*   ALB  2.2*  2.7*   GLOB   --   3.6     No results for input(s): INR, PTP, APTT in the last 72 hours. No lab exists for component: INREXT, INREXT   Recent Labs      07/24/17   0414   TIBC  174*   PSAT  32      Lab Results   Component Value Date/Time    Folate 8.4 01/23/2015 07:19 PM      No results for input(s): PH, PCO2, PO2 in the last 72 hours. No results for input(s): CPK, CKNDX, TROIQ in the last 72 hours.     No lab exists for component: CPKMB  No results found for: CHOL, CHOLX, CHLST, CHOLV, HDL, LDL, LDLC, DLDLP, TGLX, TRIGL, TRIGP, CHHD, CHHDX  Lab Results   Component Value Date/Time    Glucose (POC) 118 01/27/2015 12:15 PM    Glucose (POC) 117 01/27/2015 07:26 AM    Glucose (POC) 168 01/26/2015 09:03 PM    Glucose (POC) 146 01/26/2015 04:26 PM    Glucose (POC) 144 01/26/2015 11:27 AM     Lab Results   Component Value Date/Time    Color YELLOW/STRAW 07/22/2017 01:24 PM    Appearance CLOUDY 07/22/2017 01:24 PM    Specific gravity 1.010 07/22/2017 01:24 PM    pH (UA) 5.5 07/22/2017 01:24 PM    Protein NEGATIVE  07/22/2017 01:24 PM    Glucose NEGATIVE  07/22/2017 01:24 PM    Ketone NEGATIVE  07/22/2017 01:24 PM    Bilirubin NEGATIVE  07/22/2017 01:24 PM    Urobilinogen 0.2 07/22/2017 01:24 PM    Nitrites NEGATIVE  07/22/2017 01:24 PM    Leukocyte Esterase LARGE 07/22/2017 01:24 PM    Epithelial cells FEW 07/22/2017 01:24 PM    Bacteria NEGATIVE  07/22/2017 01:24 PM    WBC >100 07/22/2017 01:24 PM    RBC 0-5 07/22/2017 01:24 PM         Medications Reviewed:     Current Facility-Administered Medications   Medication Dose Route Frequency    epoetin jaja (EPOGEN;PROCRIT) injection 10,000 Units  10,000 Units SubCUTAneous Q7D    0.9% sodium chloride infusion 250 mL  250 mL IntraVENous PRN    metoprolol tartrate (LOPRESSOR) tablet 12.5 mg  12.5 mg Oral BID    rosuvastatin (CRESTOR) tablet 20 mg  20 mg Oral QHS    pantoprazole (PROTONIX) tablet 40 mg  40 mg Oral ACB&D    acetaminophen (TYLENOL) tablet 650 mg  650 mg Oral Q6H PRN    fluticasone (FLONASE) 50 mcg/actuation nasal spray 2 Spray  2 Spray Both Nostrils DAILY    docusate sodium (COLACE) capsule 100 mg  100 mg Oral DAILY    senna-docusate (PERICOLACE) 8.6-50 mg per tablet 1 Tab  1 Tab Oral BID    acetaminophen (TYLENOL) tablet 500 mg  500 mg Oral QID    sodium chloride (NS) flush 5-10 mL  5-10 mL IntraVENous Q8H    sodium chloride (NS) flush 5-10 mL  5-10 mL IntraVENous PRN  ondansetron (ZOFRAN) injection 4 mg  4 mg IntraVENous Q4H PRN    furosemide (LASIX) injection 40 mg  40 mg IntraVENous DAILY     ______________________________________________________________________  EXPECTED LENGTH OF STAY: 3d 9h  ACTUAL LENGTH OF STAY:          Michele Covington MD

## 2017-07-25 NOTE — PROGRESS NOTES
Bedside and Verbal shift change report given to Yesica Menendez (oncoming nurse) by Kali Kruse (offgoing nurse). Report included the following information SBAR and Kardex.

## 2017-07-25 NOTE — DISCHARGE SUMMARY
Discharge Summary       PATIENT ID: Stella Noriega  MRN: [id number]   YOB: 1935    DATE OF ADMISSION: 7/22/2017  1:09 PM    DATE OF DISCHARGE: 7/25/2017   PRIMARY CARE PROVIDER: Tonya Brown MD     ATTENDING PHYSICIAN: Dr Obdulia Benson  DISCHARGING PROVIDER: Obdulia Benson MD    To contact this individual call [phone] and ask the  to page. If unavailable ask to be transferred the Adult Hospitalist Department. CONSULTATIONS: IP CONSULT TO HOSPITALIST  IP CONSULT TO NEPHROLOGY    PROCEDURES/SURGERIES: * No surgery found *    ADMITTING DIAGNOSES & HOSPITAL COURSE:   1. Acute kidney injury, probably secondary to third spacing. Improving, continue to monitor. Nephrology consulted cr better  -Spoke to Dr Governor Rodriguez, cleared the patient for discharge on Lasix 40 BID  -Outpatient follow up   -Outpatient repeat BMP in 3-4 days    2. Bilateral lower extremity edema, with a history of chronic lymphedema. Dopplers negative for deep venous thrombosis. The patient is wheelchair bound. Now on Lasix. ( could be causing worsening of renal functions ) neg 4 L    3. Hypertension. I will continue the patient's home medications. Fairly  Controlled    4. Acute on Chronic anemia  -?source  -s/p transfusion 2 units PRBC 7/23    5. Probable urinary tract infection in a patient with a Busby catheter.   -He does not have any urinary symptoms, will hold abx for now. 6. Hypercholesterolemia. We will continue the patient's statin. Cardiac diet    PT/OT SNF    Code status: Full  DVT prophylaxis: SCD  PTA: [place]        DISCHARGE DIAGNOSES / PLAN:      1. JAMES  2.  Pedal edema       PENDING TEST RESULTS:   At the time of discharge the following test results are still pending: none    FOLLOW UP APPOINTMENTS:    Follow-up Information     Follow up With Details Comments [address] [place] F[place] On 7/25/2017 [address] MD CONSTANZA In 4 days BMP in 3-4 days [facility]  [phone]             ADDITIONAL CARE RECOMMENDATIONS:   BMP in 3-4 days. Please adjust Lasix according to reponse    DIET: Cardiac Diet    ACTIVITY: Activity as tolerated      DISCHARGE MEDICATIONS:   See Medication Reconciliation Form      NOTIFY YOUR PHYSICIAN FOR ANY OF THE FOLLOWING:   Fever over 101 degrees for 24 hours. Chest pain, shortness of breath, fever, chills, nausea, vomiting, diarrhea, change in mentation, falling, weakness, bleeding. Severe pain or pain not relieved by medications. Or, any other signs or symptoms that you may have questions about.     DISPOSITION:    Home With:   OT  PT  HH  RN      x SNF/Inpatient Rehab/LTAC    Independent/assisted living    Hospice    Other:       PATIENT CONDITION AT DISCHARGE:     Functional status    Poor    x Deconditioned     Independent      Cognition    x Lucid     Forgetful     Dementia      Catheters/lines (plus indication)    Busby     PICC     PEG    xx None      Code status    x Full code     DNR      PHYSICAL EXAMINATION AT DISCHARGE:  Please see progress note      CHRONIC MEDICAL DIAGNOSES:  Problem List as of 7/25/2017  Date Reviewed: 7/22/2017          Codes Class Noted - Resolved    * (Principal)JAMES (acute kidney injury) ([facility] ICD-10-CM: N17.9  ICD-9-CM: 584.9  7/22/2017 - Present        Status post hip surgery ICD-10-CM: S76.067  ICD-9-CM: V45.89  5/31/2017 - Present        Chronic pain of right knee ICD-10-CM: M25.561, G89.29  ICD-9-CM: 719.46, 338.29  5/31/2017 - Present        CKD (chronic kidney disease) stage 3, GFR 30-59 ml/min ICD-10-CM: N18.3  ICD-9-CM: 585.3  4/30/2017 - Present        Chronic anemia ICD-10-CM: D64.9  ICD-9-CM: 285.9  4/30/2017 - Present        Debility ICD-10-CM: R53.81  ICD-9-CM: 799.3  4/30/2017 - Present        UTI (urinary tract infection) ICD-10-CM: N39.0  ICD-9-CM: 599.0  11/7/2016 - Present        Renal failure ICD-10-CM: N19  ICD-9-CM: 285  1/23/2015 - Present        Falls ICD-10-CM: W19. Florez Mention  ICD-9-CM: E888.9  1/23/2015 - Present        Rhabdomyolysis ICD-10-CM: M62.82  ICD-9-CM: 728.88  1/23/2015 - Present        Weight loss ICD-10-CM: R63.4  ICD-9-CM: 783.21  1/23/2015 - Present        Night sweats ICD-10-CM: R61  ICD-9-CM: 780.8  1/23/2015 - Present        Anorexia ICD-10-CM: R63.0  ICD-9-CM: 783.0  1/23/2015 - Present        Anemia ICD-10-CM: D64.9  ICD-9-CM: 285.9  1/23/2015 - Present        Weakness ICD-10-CM: R53.1  ICD-9-CM: 780.79  3/20/2013 - Present        Sinus tachycardia ICD-10-CM: R00.0  ICD-9-CM: 427.89  3/20/2013 - Present        ARF (acute renal failure) (HCC) ICD-10-CM: N17.9  ICD-9-CM: 584.9  3/20/2013 - Present        Hypercholesteremia ICD-10-CM: E78.00  ICD-9-CM: 272.0  Unknown - Present        Hypertension ICD-10-CM: I10  ICD-9-CM: 401.9  Unknown - Present        Arthritis ICD-10-CM: M19.90  ICD-9-CM: 716.90  Unknown - Present        Peripheral neuropathy ([facility] ICD-10-CM: G62.9  ICD-9-CM: 356.9  3/20/2013 - Present        Tremor ICD-10-CM: R25.1  ICD-9-CM: 781.0  11/26/2012 - Present        RESOLVED: Urinary tract infection ICD-10-CM: N39.0  ICD-9-CM: 599.0  4/1/2017 - 4/7/2017        RESOLVED: Acute kidney injury ([facility] ICD-10-CM: N17.9  ICD-9-CM: 584.9  4/1/2017 - 4/7/2017        RESOLVED: Vomiting ICD-10-CM: R11.10  ICD-9-CM: 787.03  1/23/2015 - 11/10/2016        RESOLVED: Osteoarthritis of right knee (Chronic) ICD-10-CM: M17.11  ICD-9-CM: 715.96  2/11/2014 - 1/23/2015        RESOLVED: Nausea & vomiting ICD-10-CM: R11.2  ICD-9-CM: 787.01  3/20/2013 - 1/23/2015        RESOLVED: Orthostatic hypotension ICD-10-CM: I95.1  ICD-9-CM: 458.0  3/20/2013 - 1/23/2015        RESOLVED: Abdominal pain, RLQ ICD-10-CM: R10.31  ICD-9-CM: 789.03  3/20/2013 - 1/23/2015        RESOLVED: Hyperkalemia ICD-10-CM: E87.5  ICD-9-CM: 276.7  3/20/2013 - 1/23/2015        RESOLVED: Dehydration ICD-10-CM: E86.0  ICD-9-CM: 276.51 3/20/2013 - 1/23/2015        RESOLVED: Weight loss ICD-10-CM: R63.4  ICD-9-CM: 783.21  3/20/2013 - 1/23/2015        RESOLVED: Memory disorder ICD-10-CM: R41.3  ICD-9-CM: 780.93  Unknown - 1/23/2015        RESOLVED: Joint pain ICD-10-CM: M25.50  ICD-9-CM: 719.40  Unknown - 1/23/2015        RESOLVED: Insomnia ICD-10-CM: G47.00  ICD-9-CM: 780.52  3/20/2013 - 1/23/2015              Greater than 45 minutes were spent with the patient on counseling and coordination of care    Signed:   Roge Mcduffie MD  7/25/2017  1:31 PM

## 2017-07-25 NOTE — WOUND CARE
Seen just prior to discharge for area on buttocks. Cleaned of urinary incontinence and small formed stool. Brief applied  Left distal buttocks with drying abrasion = 0.7 x 0.7 x 0 cm. No induration or erythema. Left medial buttocks pinpoint of blanching red. Penis, scrotum, and groin with MASD. Aloe [place] applied to all areas. Discussed use of condom cath with RN, Helen Snider, who reports patient stated this irritated him previously. Helen Snider calling report to facility now. Recommend: diligent incont care with barrier cream & limit use of brief if able.    NINA Edgar

## 2017-07-25 NOTE — PROGRESS NOTES
Chart reviewed and patient cleared by nursing, however patient currently with wound care nurse attending to needs at this time. Will follow up as able and appropriate for OT evaluation.      Thank you,   Rosemarie Bruno, OTR/L

## 2017-07-25 NOTE — DISCHARGE INSTRUCTIONS
Discharge SNF/Rehab Instructions/LTAC       PATIENT ID: Angelique Virk  MRN: [id number]   YOB: 1935    DATE OF ADMISSION: 7/22/2017  1:09 PM    DATE OF DISCHARGE: 7/25/2017    PRIMARY CARE PROVIDER: Mega Mayberry MD       ATTENDING PHYSICIAN: Skye Oden MD  DISCHARGING PROVIDER: Skye Oden MD     To contact this individual call [phone] and ask the  to page. If unavailable ask to be transferred the Adult Hospitalist Department. CONSULTATIONS: IP CONSULT TO HOSPITALIST  IP CONSULT TO NEPHROLOGY    PROCEDURES/SURGERIES: * No surgery found *    ADMITTING DIAGNOSES & HOSPITAL COURSE:   1. Acute kidney injury, probably secondary to third spacing. Improving, continue to monitor. Nephrology consulted cr better  -Spoke to Dr Damian James, cleared the patient for discharge on Lasix 40 BID  -Outpatient follow up   -Outpatient repeat BMP in 3-4 days    2. Bilateral lower extremity edema, with a history of chronic lymphedema. Dopplers negative for deep venous thrombosis. The patient is wheelchair bound. Now on Lasix. ( could be causing worsening of renal functions ) neg 4 L    3. Hypertension. I will continue the patient's home medications. Fairly  Controlled    4. Acute on Chronic anemia  -?source  -s/p transfusion 2 units PRBC 7/23    5. Probable urinary tract infection in a patient with a Busby catheter.   -He does not have any urinary symptoms, will hold abx for now. 6. Hypercholesterolemia. We will continue the patient's statin. Cardiac diet    PT/OT SNF    Code status: Full  DVT prophylaxis: SCD  PTA: [facility]        DISCHARGE DIAGNOSES / PLAN:      1. JAMES  2.  Pedal edema       PENDING TEST RESULTS:   At the time of discharge the following test results are still pending: none    FOLLOW UP APPOINTMENTS:    Follow-up Information     Follow up With Details Comments [place] On 7/25/2017 [address] Siobhan Levy MD In 4 days BMP in 3-4 days [facility]  [phone]             ADDITIONAL CARE RECOMMENDATIONS:   BMP in 3-4 days. Please adjust Lasix according to reponse  BP checks twice a day    DIET: Cardiac Diet    ACTIVITY: Activity as tolerated      DISCHARGE MEDICATIONS:   See Medication Reconciliation Form      NOTIFY YOUR PHYSICIAN FOR ANY OF THE FOLLOWING:   Fever over 101 degrees for 24 hours. Chest pain, shortness of breath, fever, chills, nausea, vomiting, diarrhea, change in mentation, falling, weakness, bleeding. Severe pain or pain not relieved by medications. Or, any other signs or symptoms that you may have questions about.     DISPOSITION:    Home With:   OT  PT  HH  RN      x SNF/Inpatient Rehab/LTAC    Independent/assisted living    Hospice    Other:       PATIENT CONDITION AT DISCHARGE:     Functional status    Poor    x Deconditioned     Independent      Cognition    x Lucid     Forgetful     Dementia      Catheters/lines (plus indication)    Busby     PICC     PEG    xx None      Code status    x Full code     DNR      PHYSICAL EXAMINATION AT DISCHARGE:  Please see progress note      CHRONIC MEDICAL DIAGNOSES:  Problem List as of 7/25/2017  Date Reviewed: 7/22/2017          Codes Class Noted - Resolved    * (Principal)JAMES (acute kidney injury) ([facility] [facility] ICD-10-CM: N17.9  ICD-9-CM: 584.9  7/22/2017 - Present        Status post hip surgery ICD-10-CM: P39.341  ICD-9-CM: V45.89  5/31/2017 - Present        Chronic pain of right knee ICD-10-CM: M25.561, G89.29  ICD-9-CM: 719.46, 338.29  5/31/2017 - Present        CKD (chronic kidney disease) stage 3, GFR 30-59 ml/min ICD-10-CM: N18.3  ICD-9-CM: 585.3  4/30/2017 - Present        Chronic anemia ICD-10-CM: D64.9  ICD-9-CM: 285.9  4/30/2017 - Present        Debility ICD-10-CM: R53.81  ICD-9-CM: 799.3  4/30/2017 - Present        UTI (urinary tract infection) ICD-10-CM: N39.0  ICD-9-CM: 599.0  11/7/2016 - Present        Renal failure ICD-10-CM: N19  ICD-9-CM: 747  1/23/2015 - Present        Falls ICD-10-CM: W19. Kathy Velascoer  ICD-9-CM: E888.9  1/23/2015 - Present        Rhabdomyolysis ICD-10-CM: M62.82  ICD-9-CM: 728.88  1/23/2015 - Present        Weight loss ICD-10-CM: R63.4  ICD-9-CM: 783.21  1/23/2015 - Present        Night sweats ICD-10-CM: R61  ICD-9-CM: 780.8  1/23/2015 - Present        Anorexia ICD-10-CM: R63.0  ICD-9-CM: 783.0  1/23/2015 - Present        Anemia ICD-10-CM: D64.9  ICD-9-CM: 285.9  1/23/2015 - Present        Weakness ICD-10-CM: R53.1  ICD-9-CM: 780.79  3/20/2013 - Present        Sinus tachycardia ICD-10-CM: R00.0  ICD-9-CM: 427.89  3/20/2013 - Present        ARF (acute renal failure) (HCC) ICD-10-CM: N17.9  ICD-9-CM: 584.9  3/20/2013 - Present        Hypercholesteremia ICD-10-CM: E78.00  ICD-9-CM: 272.0  Unknown - Present        Hypertension ICD-10-CM: I10  ICD-9-CM: 401.9  Unknown - Present        Arthritis ICD-10-CM: M19.90  ICD-9-CM: 716.90  Unknown - Present        Peripheral neuropathy ([facility] ICD-10-CM: G62.9  ICD-9-CM: 356.9  3/20/2013 - Present        Tremor ICD-10-CM: R25.1  ICD-9-CM: 781.0  11/26/2012 - Present        RESOLVED: Urinary tract infection ICD-10-CM: N39.0  ICD-9-CM: 599.0  4/1/2017 - 4/7/2017        RESOLVED: Acute kidney injury ([facility] ICD-10-CM: N17.9  ICD-9-CM: 584.9  4/1/2017 - 4/7/2017        RESOLVED: Vomiting ICD-10-CM: R11.10  ICD-9-CM: 787.03  1/23/2015 - 11/10/2016        RESOLVED: Osteoarthritis of right knee (Chronic) ICD-10-CM: M17.11  ICD-9-CM: 715.96  2/11/2014 - 1/23/2015        RESOLVED: Nausea & vomiting ICD-10-CM: R11.2  ICD-9-CM: 787.01  3/20/2013 - 1/23/2015        RESOLVED: Orthostatic hypotension ICD-10-CM: I95.1  ICD-9-CM: 458.0  3/20/2013 - 1/23/2015        RESOLVED: Abdominal pain, RLQ ICD-10-CM: R10.31  ICD-9-CM: 789.03  3/20/2013 - 1/23/2015        RESOLVED: Hyperkalemia ICD-10-CM: E87.5  ICD-9-CM: 276.7  3/20/2013 - 1/23/2015        RESOLVED: Dehydration ICD-10-CM: E86.0  ICD-9-CM: 276.51  3/20/2013 - 1/23/2015        RESOLVED: Weight loss ICD-10-CM: R63.4  ICD-9-CM: 783.21  3/20/2013 - 1/23/2015        RESOLVED: Memory disorder ICD-10-CM: R41.3  ICD-9-CM: 780.93  Unknown - 1/23/2015        RESOLVED: Joint pain ICD-10-CM: M25.50  ICD-9-CM: 719.40  Unknown - 1/23/2015        RESOLVED: Insomnia ICD-10-CM: G47.00  ICD-9-CM: 780.52  3/20/2013 - 1/23/2015                CDMP Checked:   Yes x     PROBLEM LIST Updated:  Yes x         Signed:   Paulina Castellano MD  7/25/2017  1:29 PM

## 2017-07-26 ENCOUNTER — EXTERNAL NURSING HOME DOCUMENTATION (OUTPATIENT)
Dept: INTERNAL MEDICINE CLINIC | Age: 82
End: 2017-07-26

## 2017-07-26 DIAGNOSIS — R33.9 URINARY RETENTION: ICD-10-CM

## 2017-07-26 DIAGNOSIS — I10 ESSENTIAL HYPERTENSION: ICD-10-CM

## 2017-07-26 DIAGNOSIS — N17.9 ACUTE ON CHRONIC KIDNEY FAILURE (HCC): Primary | ICD-10-CM

## 2017-07-26 DIAGNOSIS — E78.5 HYPERLIPIDEMIA, UNSPECIFIED HYPERLIPIDEMIA TYPE: ICD-10-CM

## 2017-07-26 DIAGNOSIS — Z97.8 FOLEY CATHETER IN PLACE: ICD-10-CM

## 2017-07-26 DIAGNOSIS — N18.9 ACUTE ON CHRONIC KIDNEY FAILURE (HCC): Primary | ICD-10-CM

## 2017-07-26 DIAGNOSIS — D64.9 CHRONIC ANEMIA: ICD-10-CM

## 2017-07-26 DIAGNOSIS — I87.2 VENOUS INSUFFICIENCY OF BOTH LOWER EXTREMITIES: ICD-10-CM

## 2017-07-26 DIAGNOSIS — N18.30 CKD (CHRONIC KIDNEY DISEASE) STAGE 3, GFR 30-59 ML/MIN (HCC): ICD-10-CM

## 2017-07-26 NOTE — PROGRESS NOTES
Subjective:  Duran Li is an [de-identified] y.o. white man who was admitted to [facility] after hospitalization with acute and chronic kidney failure. The patient has known CKD with creatinine around 1.5 or so. He presented to the ER with significant increase of bilateral lower extremity edema. He was found to have a creatinine of 2.35 and hemoglobin of 7.5. He also has chronic anemia. The patient was in this facility a few months ago after a hip fracture. He has a few other chronic medical issues. I have reviewed the hospital records. He tells me he is better now. He denies chest pain, dyspnea, any pain in the hip or abdomen. PMH:  Chronic anemia, chronic lower extremity edema/lymphedema, hearing loss, hypertension. PSH:  Back surgery, hip surgery. FH:  Stroke and lung cancer. SH:  Former smoker. No alcohol use. He lives at [facility]. Allergies:  Cipro, NSAIDs. Medications:  See NH list including Lasix, Tylenol, Crestor, Colace, Flonase, Metoprolol, Nystatin cream, Protonix, Senexon. Physical Examination:   VITAL SIGNS:  Stable, afebrile per nurses notes. Elderly man lying in bed, NAD, looks a bit lethargic, answers questions properly. He is hard of hearing. HEENT:  Looks pale, otherwise NAD. Oral mucosa is moist.     NECK:  Supple, no JVD or bruits. HEART: Regular, distant sounds. LUNGS:  Clear with diminished sounds. ABDOMEN:  Soft, nontender. Normal bowel sounds. EXTREMITIES: Trace bilateral pedal edema with skin changes and DJD changes    NEURO: Generalized weakness, otherwise nonfocal.     Impression:   Acute and chronic kidney failure. CKD. Lower extremity lymphedema/venous insufficiency. HTN. Chronic anemia. Hyperlipidemia. Chronic Busby catheter for urinary retention. Recent hip fracture.        Plan:   Continue current meds. Continue PT/OT. Baseline labs. Monitor kidney function and hemoglobin closely. Full code.     MedDATA/gwo

## 2017-07-27 ENCOUNTER — EXTERNAL NURSING HOME DOCUMENTATION (OUTPATIENT)
Dept: INTERNAL MEDICINE CLINIC | Age: 82
End: 2017-07-27

## 2017-07-27 DIAGNOSIS — I89.0 LYMPHEDEMA: ICD-10-CM

## 2017-07-27 DIAGNOSIS — I10 ESSENTIAL HYPERTENSION: ICD-10-CM

## 2017-07-27 DIAGNOSIS — E78.5 HYPERLIPIDEMIA, UNSPECIFIED HYPERLIPIDEMIA TYPE: ICD-10-CM

## 2017-07-27 DIAGNOSIS — N18.9 CKD (CHRONIC KIDNEY DISEASE), UNSPECIFIED STAGE: Primary | ICD-10-CM

## 2017-07-27 NOTE — PROGRESS NOTES
THIS IS NOT A COMPLETE MEDICAL RECORD ON THIS PATIENT. THIS IS A PATIENT AT [facility]. PLEASE CONTACT THE FACILITY LISTED BELOW FOR MORE INFORMATION ON THIS PATIENT. THE COMPLETE RECORD RESIDES WITH THIS LONG TERM CARE FACILITY. HISTORY OF PRESENT ILLNESS  Rahul Dela Cruz is a [de-identified] y.o. male. This patient is currently under the care of Dr. Helen David at [facility].  The patient was admitted to this facility following hospitalization related to increased lower extremity edema and JAMES. His past medical history also includes CKD, lymphedema, anemia, HTN, and HLD. The patient is seen today for follow-up and lab review. The patient had CBC and CMP drawn yesterday:  WBC-8.3, H&H- 11.4/32.7, BUN-50, Creatinine- 2.21. Per chart review, last available renal panel during hospitalization was on 07/24/17, with BUN- 51, creatinine- 2.22. HPI    Review of Systems   Constitutional: Negative for chills and fever. HENT: Negative for congestion. Respiratory: Negative for cough and shortness of breath. Cardiovascular: Negative for chest pain and leg swelling. Gastrointestinal: Negative for abdominal pain. Genitourinary: Negative. Musculoskeletal: Negative. Skin: Negative. Neurological: Negative for dizziness and headaches. Physical Exam   Constitutional: He is oriented to person, place, and time. He appears well-developed and well-nourished. No distress. In wheelchair   HENT:   Head: Normocephalic and atraumatic. Cardiovascular: Normal rate and regular rhythm. Pulmonary/Chest: Effort normal and breath sounds normal. No respiratory distress. He has no wheezes. He has no rales. Abdominal: Soft. Bowel sounds are normal. He exhibits no distension. There is no tenderness. Musculoskeletal: He exhibits no edema. Neurological: He is alert and oriented to person, place, and time. Skin: Skin is warm and dry. Psychiatric: He has a normal mood and affect.    Nursing note reviewed. ASSESSMENT and PLAN  Encounter Diagnoses   Name Primary?  CKD (chronic kidney disease), unspecified stage Yes    Lymphedema     Essential hypertension     Hyperlipidemia, unspecified hyperlipidemia type      Nursing to schedule follow-up with nephrology, Dr. Janna Paul. Repeat BMP in 1 week. Reviewed medications and side effects    Nursing to continue to monitor closely and notify MD/NP of any change in condition.

## 2017-07-31 PROBLEM — E78.5 HYPERLIPIDEMIA: Status: ACTIVE | Noted: 2017-07-31

## 2017-07-31 PROBLEM — R33.9 URINARY RETENTION: Status: ACTIVE | Noted: 2017-07-31

## 2017-07-31 PROBLEM — Z97.8 FOLEY CATHETER IN PLACE: Status: ACTIVE | Noted: 2017-07-31

## 2017-07-31 PROBLEM — I87.2 VENOUS INSUFFICIENCY OF BOTH LOWER EXTREMITIES: Status: ACTIVE | Noted: 2017-07-31

## 2017-08-02 ENCOUNTER — EXTERNAL NURSING HOME DOCUMENTATION (OUTPATIENT)
Dept: INTERNAL MEDICINE CLINIC | Age: 82
End: 2017-08-02

## 2017-08-02 DIAGNOSIS — I10 ESSENTIAL HYPERTENSION: ICD-10-CM

## 2017-08-02 DIAGNOSIS — N18.30 CKD (CHRONIC KIDNEY DISEASE) STAGE 3, GFR 30-59 ML/MIN (HCC): Primary | ICD-10-CM

## 2017-08-02 DIAGNOSIS — I87.2 VENOUS INSUFFICIENCY OF BOTH LOWER EXTREMITIES: ICD-10-CM

## 2017-08-02 DIAGNOSIS — D64.9 ANEMIA, UNSPECIFIED TYPE: ICD-10-CM

## 2017-08-02 NOTE — PROGRESS NOTES
Subjective:  Leny Burkett is an [de-identified] y. o. white man seen at [facility] for follow up. He came here recently after hospitalization with acute and chronic kidney failure. He has a number of chronic medical issues including chronic anemia. He had a recent hip fracture as well and has gradually recovered from that. He tells me his energy is low, otherwise, denies any significant new problems. The staff do not report any significant new problems at this point. He is doing well with rehab.     Allergies:  Cipro, NSAID's.      Medications:  See NH list reviewed and signed. Studies:  Baseline labs significant for hemoglobin 11.4, BUN 50, creatinine 2.27.     Physical Examination:   VITAL SIGNS:  Stable, afebrile per nurses notes. Narinder Rahman pleasant frail man lying in bed, NAD, answers questions properly. HEENT:  Unremarkable.      HEART: Regular, distant sounds.    LUNGS:  Clear with diminished sounds. ABDOMEN:  Soft, nontender. EXTREMITIES: No significant pedal edema today, DJD changes    NEURO: Generalized weakness, otherwise nonfocal.      Impression:   CKD. HTN. Chronic anemia. Venous insufficiency.      Plan:   Recheck BMP. Continue current meds. Continue PT/OT. Overall improved and making good progress.   Full code.     MedDATA/gwo

## 2017-08-10 ENCOUNTER — EXTERNAL NURSING HOME DOCUMENTATION (OUTPATIENT)
Dept: INTERNAL MEDICINE CLINIC | Age: 82
End: 2017-08-10

## 2017-08-10 DIAGNOSIS — K59.00 CONSTIPATION, UNSPECIFIED CONSTIPATION TYPE: ICD-10-CM

## 2017-08-10 DIAGNOSIS — I89.0 LYMPHEDEMA: ICD-10-CM

## 2017-08-10 DIAGNOSIS — N18.9 CKD (CHRONIC KIDNEY DISEASE), UNSPECIFIED STAGE: ICD-10-CM

## 2017-08-10 DIAGNOSIS — I10 ESSENTIAL HYPERTENSION: Primary | ICD-10-CM

## 2017-08-10 DIAGNOSIS — K21.9 GASTROESOPHAGEAL REFLUX DISEASE WITHOUT ESOPHAGITIS: ICD-10-CM

## 2017-08-10 NOTE — PROGRESS NOTES
THIS IS NOT A COMPLETE MEDICAL RECORD ON THIS PATIENT. THIS IS A PATIENT AT [facility]. PLEASE CONTACT THE FACILITY LISTED BELOW FOR MORE INFORMATION ON THIS PATIENT. THE COMPLETE RECORD RESIDES WITH THIS LONG TERM CARE FACILITY. HISTORY OF PRESENT ILLNESS  Minesh Armendariz is a [de-identified] y.o. male. This patient is currently under the care of Dr. Denver Copping at [facility].  The patient was admitted to this facility following hospitalization related to increased lower extremity edema and JAMES. His past medical history also includes CKD, lymphedema, anemia, HTN, and HLD. The patient will complete a course of Keflex 500 mg po bid for UTI today. Per nursing, the patient refused his morning medications. The patient states that he does not like to take a lot of medications. He states that he will take his medications if they can be lessened in number. The patient states that he is generally feeling well. He denies pain. HPI    Review of Systems   Constitutional: Negative for chills and fever. HENT: Negative for congestion. Respiratory: Negative for cough and shortness of breath. Cardiovascular: Negative for chest pain and leg swelling. Gastrointestinal: Negative for abdominal pain. Genitourinary: Negative. Musculoskeletal: Negative. Skin: Negative. Neurological: Negative for dizziness and headaches. Physical Exam   Constitutional: He is oriented to person, place, and time. He appears well-developed and well-nourished. No distress. In wheelchair   HENT:   Head: Normocephalic and atraumatic. Cardiovascular: Normal rate and regular rhythm. Pulmonary/Chest: Effort normal and breath sounds normal. No respiratory distress. He has no wheezes. He has no rales. Abdominal: Soft. Bowel sounds are normal. He exhibits no distension. There is no tenderness. Musculoskeletal: He exhibits no edema. Neurological: He is alert and oriented to person, place, and time.    Skin: Skin is warm and dry. Psychiatric: He has a normal mood and affect. Nursing note reviewed. ASSESSMENT and PLAN  Encounter Diagnoses   Name Primary?  Essential hypertension Yes    Lymphedema     Gastroesophageal reflux disease without esophagitis     CKD (chronic kidney disease), unspecified stage     Constipation, unspecified constipation type      Will discontinue colace 100 mg daily. Will discontinue Tylenol 500 mg q6h. Start Tylenol 500 mg bid. Continue Protonix 40 mg bid, Senexon S po bid, Metoprolol 12.5 mg bid, and Lasix 40 mg bid. Discussed with patient the importance of taking medications, as prescribed. Lab results and schedule of future lab studies reviewed     Nursing to continue to monitor closely and notify MD/NP of any change in condition.

## 2017-08-23 ENCOUNTER — EXTERNAL NURSING HOME DOCUMENTATION (OUTPATIENT)
Dept: INTERNAL MEDICINE CLINIC | Age: 82
End: 2017-08-23

## 2017-08-23 DIAGNOSIS — I10 ESSENTIAL HYPERTENSION: ICD-10-CM

## 2017-08-23 DIAGNOSIS — R53.81 DEBILITY: ICD-10-CM

## 2017-08-23 DIAGNOSIS — I87.2 VENOUS INSUFFICIENCY OF BOTH LOWER EXTREMITIES: ICD-10-CM

## 2017-08-23 DIAGNOSIS — D64.9 ANEMIA, UNSPECIFIED TYPE: ICD-10-CM

## 2017-08-23 DIAGNOSIS — N18.30 CKD (CHRONIC KIDNEY DISEASE) STAGE 3, GFR 30-59 ML/MIN (HCC): Primary | ICD-10-CM

## 2017-08-23 NOTE — PROGRESS NOTES
Subjective: Lakeisha Pressley is an [de-identified] y. o. white man seen at [facility] for follow up. He has been here for over two weeks after hospitalization with acute and chronic kidney failure. He has a number of chronic medical issues. He has had a previous hip fracture. He tells me overall he is doing okay. He is still very weak, unable to walk independently. He can use a walker with assistance or get around in a wheelchair. He denies any significant new problems. The staff do not report any significant issues either. He is being treated for a UTI as well.       Allergies:  Cipro, NSAID's.      Medications:  See NH list reviewed and signed.       Studies:  Baseline labs significant for BUN 43, creatinine 1.82, hemoglobin 11.4. His baseline creatine is around 2 or so.      Physical Examination:   VITAL SIGNS:  Stable, afebrile per nurses notes. Yasmin Sanders man sitting in chair, NAD, answers simple questions properly. HEENT:  Unremarkable.      HEART: Regular, distant sounds.    LUNGS:  Clear with diminished sounds. ABDOMEN:  Soft, nontender.     EXTREMITIES: Minimal pedal edema today which is much improved since admission. DJD changes.    NEURO: Generalized weakness, otherwise nonfocal.       Impression:   CKD. Venous insufficiency. HTN. Chronic anemia. Debility.      Plan:   Continue current meds. Continue PT/OT as tolerated. He seems to be making slow progress, but overall prognosis is poor.   Full code.      MedDATA/gwo

## 2017-08-30 ENCOUNTER — EXTERNAL NURSING HOME DOCUMENTATION (OUTPATIENT)
Dept: INTERNAL MEDICINE CLINIC | Age: 82
End: 2017-08-30

## 2017-08-30 DIAGNOSIS — I89.0 LYMPHEDEMA: ICD-10-CM

## 2017-08-30 DIAGNOSIS — I10 ESSENTIAL HYPERTENSION: ICD-10-CM

## 2017-08-30 DIAGNOSIS — D64.9 ANEMIA, UNSPECIFIED TYPE: ICD-10-CM

## 2017-08-30 DIAGNOSIS — E78.5 HYPERLIPIDEMIA, UNSPECIFIED HYPERLIPIDEMIA TYPE: ICD-10-CM

## 2017-08-30 DIAGNOSIS — N18.9 CKD (CHRONIC KIDNEY DISEASE), UNSPECIFIED STAGE: Primary | ICD-10-CM

## 2018-01-31 ENCOUNTER — APPOINTMENT (OUTPATIENT)
Dept: GENERAL RADIOLOGY | Age: 83
DRG: 871 | End: 2018-01-31
Attending: EMERGENCY MEDICINE
Payer: MEDICARE

## 2018-01-31 ENCOUNTER — HOSPITAL ENCOUNTER (INPATIENT)
Age: 83
LOS: 8 days | Discharge: NURSING, REHAB OR LONG-TERM CARE | DRG: 871 | End: 2018-02-08
Attending: EMERGENCY MEDICINE | Admitting: FAMILY MEDICINE
Payer: MEDICARE

## 2018-01-31 ENCOUNTER — APPOINTMENT (OUTPATIENT)
Dept: CT IMAGING | Age: 83
DRG: 871 | End: 2018-01-31
Attending: EMERGENCY MEDICINE
Payer: MEDICARE

## 2018-01-31 DIAGNOSIS — D72.829 LEUKOCYTOSIS, UNSPECIFIED TYPE: ICD-10-CM

## 2018-01-31 DIAGNOSIS — A41.9 SEPSIS, DUE TO UNSPECIFIED ORGANISM: ICD-10-CM

## 2018-01-31 DIAGNOSIS — E86.0 DEHYDRATION: Primary | ICD-10-CM

## 2018-01-31 PROBLEM — I95.9 HYPOTENSION: Status: ACTIVE | Noted: 2018-01-31

## 2018-01-31 PROBLEM — R57.9 SHOCK (HCC): Status: ACTIVE | Noted: 2018-01-31

## 2018-01-31 PROBLEM — R65.10 SIRS (SYSTEMIC INFLAMMATORY RESPONSE SYNDROME) (HCC): Status: ACTIVE | Noted: 2018-01-31

## 2018-01-31 PROBLEM — R82.90 ABNORMAL URINALYSIS: Status: ACTIVE | Noted: 2018-01-31

## 2018-01-31 LAB
ALBUMIN SERPL-MCNC: 2.5 G/DL (ref 3.5–5)
ALBUMIN/GLOB SERPL: 0.6 {RATIO} (ref 1.1–2.2)
ALP SERPL-CCNC: 704 U/L (ref 45–117)
ALT SERPL-CCNC: 155 U/L (ref 12–78)
ANION GAP SERPL CALC-SCNC: 13 MMOL/L (ref 5–15)
APPEARANCE UR: ABNORMAL
APTT PPP: 38.6 SEC (ref 22.1–32.5)
AST SERPL-CCNC: 141 U/L (ref 15–37)
BACTERIA URNS QL MICRO: NEGATIVE /HPF
BASOPHILS # BLD: 0 K/UL (ref 0–0.1)
BASOPHILS NFR BLD: 0 % (ref 0–1)
BILIRUB SERPL-MCNC: 1.2 MG/DL (ref 0.2–1)
BILIRUB UR QL: NEGATIVE
BNP SERPL-MCNC: ABNORMAL PG/ML (ref 0–450)
BUN SERPL-MCNC: 83 MG/DL (ref 6–20)
BUN/CREAT SERPL: 28 (ref 12–20)
CALCIUM SERPL-MCNC: 7.8 MG/DL (ref 8.5–10.1)
CHLORIDE SERPL-SCNC: 98 MMOL/L (ref 97–108)
CO2 SERPL-SCNC: 27 MMOL/L (ref 21–32)
COLOR UR: ABNORMAL
CREAT SERPL-MCNC: 2.99 MG/DL (ref 0.7–1.3)
DIFFERENTIAL METHOD BLD: ABNORMAL
EOSINOPHIL # BLD: 0 K/UL (ref 0–0.4)
EOSINOPHIL NFR BLD: 0 % (ref 0–7)
EPITH CASTS URNS QL MICRO: ABNORMAL /LPF
ERYTHROCYTE [DISTWIDTH] IN BLOOD BY AUTOMATED COUNT: 20.9 % (ref 11.5–14.5)
FLUAV AG NPH QL IA: NEGATIVE
FLUBV AG NOSE QL IA: NEGATIVE
GLOBULIN SER CALC-MCNC: 4.4 G/DL (ref 2–4)
GLUCOSE SERPL-MCNC: 126 MG/DL (ref 65–100)
GLUCOSE UR STRIP.AUTO-MCNC: NEGATIVE MG/DL
HCT VFR BLD AUTO: 29.6 % (ref 36.6–50.3)
HGB BLD-MCNC: 9.8 G/DL (ref 12.1–17)
HGB UR QL STRIP: ABNORMAL
IMM GRANULOCYTES # BLD: 1.4 K/UL (ref 0–0.04)
IMM GRANULOCYTES NFR BLD AUTO: 4 % (ref 0–0.5)
INR PPP: 1.1 (ref 0.9–1.1)
KETONES UR QL STRIP.AUTO: NEGATIVE MG/DL
LACTATE SERPL-SCNC: 1 MMOL/L (ref 0.4–2)
LEUKOCYTE ESTERASE UR QL STRIP.AUTO: ABNORMAL
LYMPHOCYTES # BLD: 1 K/UL (ref 0.8–3.5)
LYMPHOCYTES NFR BLD: 3 % (ref 12–49)
MCH RBC QN AUTO: 29.9 PG (ref 26–34)
MCHC RBC AUTO-ENTMCNC: 33.1 G/DL (ref 30–36.5)
MCV RBC AUTO: 90.2 FL (ref 80–99)
MONOCYTES # BLD: 1.7 K/UL (ref 0–1)
MONOCYTES NFR BLD: 5 % (ref 5–13)
NEUTS SEG # BLD: 30 K/UL (ref 1.8–8)
NEUTS SEG NFR BLD: 88 % (ref 32–75)
NITRITE UR QL STRIP.AUTO: NEGATIVE
NRBC # BLD: 0 K/UL (ref 0–0.01)
NRBC BLD-RTO: 0 PER 100 WBC
PH UR STRIP: 5.5 [PH] (ref 5–8)
PLATELET # BLD AUTO: 234 K/UL (ref 150–400)
PMV BLD AUTO: 11.3 FL (ref 8.9–12.9)
POTASSIUM SERPL-SCNC: 3.9 MMOL/L (ref 3.5–5.1)
PROT SERPL-MCNC: 6.9 G/DL (ref 6.4–8.2)
PROT UR STRIP-MCNC: 30 MG/DL
PROTHROMBIN TIME: 11.6 SEC (ref 9–11.1)
RBC # BLD AUTO: 3.28 M/UL (ref 4.1–5.7)
RBC #/AREA URNS HPF: ABNORMAL /HPF (ref 0–5)
RBC MORPH BLD: ABNORMAL
SODIUM SERPL-SCNC: 138 MMOL/L (ref 136–145)
SP GR UR REFRACTOMETRY: 1.01 (ref 1–1.03)
THERAPEUTIC RANGE,PTTT: ABNORMAL SECS (ref 58–77)
TROPONIN I SERPL-MCNC: <0.04 NG/ML
UA: UC IF INDICATED,UAUC: ABNORMAL
UROBILINOGEN UR QL STRIP.AUTO: 1 EU/DL (ref 0.2–1)
WBC # BLD AUTO: 34.1 K/UL (ref 4.1–11.1)
WBC URNS QL MICRO: >100 /HPF (ref 0–4)

## 2018-01-31 PROCEDURE — 87040 BLOOD CULTURE FOR BACTERIA: CPT | Performed by: EMERGENCY MEDICINE

## 2018-01-31 PROCEDURE — 99284 EMERGENCY DEPT VISIT MOD MDM: CPT

## 2018-01-31 PROCEDURE — 87076 CULTURE ANAEROBE IDENT EACH: CPT | Performed by: EMERGENCY MEDICINE

## 2018-01-31 PROCEDURE — 87186 SC STD MICRODIL/AGAR DIL: CPT | Performed by: EMERGENCY MEDICINE

## 2018-01-31 PROCEDURE — 93005 ELECTROCARDIOGRAM TRACING: CPT

## 2018-01-31 PROCEDURE — 65610000006 HC RM INTENSIVE CARE

## 2018-01-31 PROCEDURE — 71045 X-RAY EXAM CHEST 1 VIEW: CPT

## 2018-01-31 PROCEDURE — 74176 CT ABD & PELVIS W/O CONTRAST: CPT

## 2018-01-31 PROCEDURE — 87086 URINE CULTURE/COLONY COUNT: CPT | Performed by: EMERGENCY MEDICINE

## 2018-01-31 PROCEDURE — 85025 COMPLETE CBC W/AUTO DIFF WBC: CPT | Performed by: EMERGENCY MEDICINE

## 2018-01-31 PROCEDURE — 74011250636 HC RX REV CODE- 250/636: Performed by: FAMILY MEDICINE

## 2018-01-31 PROCEDURE — 83605 ASSAY OF LACTIC ACID: CPT | Performed by: EMERGENCY MEDICINE

## 2018-01-31 PROCEDURE — 87077 CULTURE AEROBIC IDENTIFY: CPT | Performed by: EMERGENCY MEDICINE

## 2018-01-31 PROCEDURE — 74011000258 HC RX REV CODE- 258: Performed by: EMERGENCY MEDICINE

## 2018-01-31 PROCEDURE — 85610 PROTHROMBIN TIME: CPT | Performed by: EMERGENCY MEDICINE

## 2018-01-31 PROCEDURE — 75810000137 HC PLCMT CENT VENOUS CATH

## 2018-01-31 PROCEDURE — 36415 COLL VENOUS BLD VENIPUNCTURE: CPT | Performed by: EMERGENCY MEDICINE

## 2018-01-31 PROCEDURE — 74011250636 HC RX REV CODE- 250/636: Performed by: EMERGENCY MEDICINE

## 2018-01-31 PROCEDURE — 84484 ASSAY OF TROPONIN QUANT: CPT | Performed by: EMERGENCY MEDICINE

## 2018-01-31 PROCEDURE — 87804 INFLUENZA ASSAY W/OPTIC: CPT | Performed by: EMERGENCY MEDICINE

## 2018-01-31 PROCEDURE — 02H633Z INSERTION OF INFUSION DEVICE INTO RIGHT ATRIUM, PERCUTANEOUS APPROACH: ICD-10-PCS | Performed by: EMERGENCY MEDICINE

## 2018-01-31 PROCEDURE — 85730 THROMBOPLASTIN TIME PARTIAL: CPT | Performed by: EMERGENCY MEDICINE

## 2018-01-31 PROCEDURE — 96367 TX/PROPH/DG ADDL SEQ IV INF: CPT

## 2018-01-31 PROCEDURE — 81001 URINALYSIS AUTO W/SCOPE: CPT | Performed by: EMERGENCY MEDICINE

## 2018-01-31 PROCEDURE — 80053 COMPREHEN METABOLIC PANEL: CPT | Performed by: EMERGENCY MEDICINE

## 2018-01-31 PROCEDURE — 96365 THER/PROPH/DIAG IV INF INIT: CPT

## 2018-01-31 PROCEDURE — 83880 ASSAY OF NATRIURETIC PEPTIDE: CPT | Performed by: EMERGENCY MEDICINE

## 2018-01-31 RX ORDER — VANCOMYCIN 1.75 GRAM/500 ML IN 0.9 % SODIUM CHLORIDE INTRAVENOUS
1750
Status: COMPLETED | OUTPATIENT
Start: 2018-01-31 | End: 2018-02-01

## 2018-01-31 RX ORDER — SODIUM CHLORIDE 9 MG/ML
50 INJECTION, SOLUTION INTRAVENOUS CONTINUOUS
Status: DISCONTINUED | OUTPATIENT
Start: 2018-01-31 | End: 2018-02-04

## 2018-01-31 RX ORDER — ACETAMINOPHEN 325 MG/1
650 TABLET ORAL
COMMUNITY

## 2018-01-31 RX ORDER — FUROSEMIDE 40 MG/1
40 TABLET ORAL EVERY EVENING
COMMUNITY

## 2018-01-31 RX ORDER — FUROSEMIDE 40 MG/1
80 TABLET ORAL DAILY
COMMUNITY

## 2018-01-31 RX ORDER — ACETAMINOPHEN 500 MG
500 TABLET ORAL 2 TIMES DAILY
COMMUNITY

## 2018-01-31 RX ORDER — SODIUM CHLORIDE 0.9 % (FLUSH) 0.9 %
5-10 SYRINGE (ML) INJECTION AS NEEDED
Status: DISCONTINUED | OUTPATIENT
Start: 2018-01-31 | End: 2018-02-08 | Stop reason: HOSPADM

## 2018-01-31 RX ORDER — SODIUM CHLORIDE 9 MG/ML
125 INJECTION, SOLUTION INTRAVENOUS CONTINUOUS
Status: DISCONTINUED | OUTPATIENT
Start: 2018-01-31 | End: 2018-02-02 | Stop reason: SDUPTHER

## 2018-01-31 RX ADMIN — SODIUM CHLORIDE 125 ML/HR: 900 INJECTION, SOLUTION INTRAVENOUS at 23:42

## 2018-01-31 RX ADMIN — VANCOMYCIN HYDROCHLORIDE 1750 MG: 10 INJECTION, POWDER, LYOPHILIZED, FOR SOLUTION INTRAVENOUS at 22:23

## 2018-01-31 RX ADMIN — PIPERACILLIN SODIUM,TAZOBACTAM SODIUM 3.38 G: 3; .375 INJECTION, POWDER, FOR SOLUTION INTRAVENOUS at 20:53

## 2018-01-31 RX ADMIN — SODIUM CHLORIDE 1000 ML: 900 INJECTION, SOLUTION INTRAVENOUS at 22:21

## 2018-01-31 RX ADMIN — SODIUM CHLORIDE 1000 ML: 900 INJECTION, SOLUTION INTRAVENOUS at 20:26

## 2018-01-31 RX ADMIN — SODIUM CHLORIDE 1000 ML: 900 INJECTION, SOLUTION INTRAVENOUS at 21:00

## 2018-01-31 NOTE — IP AVS SNAPSHOT
27[address] [facility] [address] [place] [address] 
[phone] Patient: Shira Lara MRN: [id number] DAB:[id number] About your hospitalization You were admitted on:  January 31, 2018 You last received care in the:  [address] [place] [address] You were discharged on:  February 8, 2018 Why you were hospitalized Your primary diagnosis was: Shock (Hcc) Your diagnoses also included:  Dehydration, Leukocytosis, Hypotension, Abnormal Urinalysis, Sirs (Systemic Inflammatory Response Syndrome) (Hcc) Follow-up Information Follow up With Details Comments Contact Info Perez Nam MD Call As needed for primary care 1002 [facility] 102 1007 [facility] 
[phone] Jah Pineda MD Schedule an appointment as soon as possible for a visit in 2 weeks For follow up of bladder thickening [facility] [address] [place] [address] 
[phone] Leatha Marin MD Schedule an appointment as soon as possible for a visit in 2 weeks For follow up colonoscopy [address] [place] [address] [address] [place] [address] 
[phone] Tello Dasilva MD Schedule an appointment as soon as possible for a visit in 1 week For follow up of blood stream infection [address] [place] [address] [address] [place] [address] 
[phone] Discharge Orders None A check mehnaz indicates which time of day the medication should be taken. My Medications START taking these medications Instructions Each Dose to Equal  
 Morning Noon Evening Bedtime  
 bacitracin 500 unit/gram ointment Your last dose was: Your next dose is:    
   
   
 Apply 1 Packet to affected area as needed (Catheter Removal Dressing). 1 Packet  
    
   
   
   
  
 balsam peru-castor oil [id number] mg/gram ointment Commonly known as:  Gissell Gonzales Your last dose was: Your next dose is: Apply  to affected area two (2) times a day. To left hip pressure ulcer  
     
   
   
   
  
 piperacillin-tazobactam 10.125 g/500 mL Solp infusion Commonly known as:  Alaine Baumgarten Your last dose was: Your next dose is:    
   
   
 500 mL by IntraVENous route continuously every twenty-four (24) hours for 6 days. CHANGE how you take these medications Instructions Each Dose to Equal  
 Morning Noon Evening Bedtime SENEXON-S 8.6-50 mg per tablet Generic drug:  senna-docusate What changed:  See the new instructions. Your last dose was: Your next dose is: TAKE 1 TABLET BY MOUTH TWICE DAILY AT 9A AND 9P DX:CONSTIPATION *STOP LAXATIVES/STOOL SOFTNERS IF > 2BM IN 1 DAY CONTINUE taking these medications Instructions Each Dose to Equal  
 Morning Noon Evening Bedtime * acetaminophen 500 mg tablet Commonly known as:  TYLENOL Your last dose was: Your next dose is: Take 500 mg by mouth two (2) times a day. 500 mg  
    
   
   
   
  
 * acetaminophen 325 mg tablet Commonly known as:  TYLENOL Your last dose was: Your next dose is: Take 650 mg by mouth every eight (8) hours as needed for Pain. 650 mg  
    
   
   
   
  
 CRESTOR 20 mg tablet Generic drug:  rosuvastatin Your last dose was: Your next dose is: Take 20 mg by mouth nightly. 20 mg  
    
   
   
   
  
 FLONASE 50 mcg/actuation nasal spray Generic drug:  fluticasone Your last dose was: Your next dose is: 2 Sprays by Both Nostrils route daily. 2 Spray * furosemide 40 mg tablet Commonly known as:  LASIX Your last dose was: Your next dose is: Take 80 mg by mouth daily. 80 mg  
    
   
   
   
  
 * furosemide 40 mg tablet Commonly known as:  LASIX Your last dose was: Your next dose is: Take 40 mg by mouth every evening. 40 mg  
    
   
   
   
  
 metoprolol tartrate 25 mg tablet Commonly known as:  LOPRESSOR Your last dose was: Your next dose is: Take 12.5 mg by mouth two (2) times a day. 12.5 mg  
    
   
   
   
  
 nystatin topical cream  
Commonly known as:  MYCOSTATIN Your last dose was: Your next dose is:    
   
   
 Apply  to affected area three (3) times daily. Apply to groin and sacrum every shift (7-3, 3-11, 11-7)  
     
   
   
   
  
 pantoprazole 40 mg tablet Commonly known as:  PROTONIX Your last dose was: Your next dose is: Take 1 tablet by mouth Before breakfast and dinner. 40 mg  
    
   
   
   
  
 * RISAMINE 0.44-20.6 % Oint Generic drug:  Menthol-Zinc Oxide Your last dose was: Your next dose is:    
   
   
 Apply  to affected area as needed (skin irritation). In addition to scheduled regimen. Indications: DIAPER RASH, SKIN IRRITATION  
     
   
   
   
  
 * RISAMINE 0.44-20.6 % Oint Generic drug:  Menthol-Zinc Oxide Your last dose was: Your next dose is:    
   
   
 Apply  to affected area two (2) times a day. Apply to buttocks. * Notice: This list has 6 medication(s) that are the same as other medications prescribed for you. Read the directions carefully, and ask your doctor or other care provider to review them with you. Where to Get Your Medications Information on where to get these meds will be given to you by the nurse or doctor. ! Ask your nurse or doctor about these medications  
  bacitracin 500 unit/gram ointment  
 balsam peru-castor oil  mg/gram ointment  
 piperacillin-tazobactam 10.125 g/500 mL Solp infusion Discharge Instructions Discharging provider: Ry Lacy MD 
 
 Primary care provider: Janet Ambriz MD 
 
[address] COURSE: An 81-year-old white male with past medical history of arthritis, arrhythmia, constipation, hearing loss, hypertension, hyperlipidemia, joint pain, anemia, memory loss, who presented to the emergency department via EMS from assisted living facility with a chief complaint of generalized body aches.   
 
Sepsis with septic shock with E.coli and clostridial bacteremia (POA) - GI source? 
- CT abdomen/pelvis 1/31 with bladder wall thickening, cholelithiasis, RP adenopathy, degenerative arthritis - Blood cx 1/31 with clostridium perfringens, e.coli, gram positive rods, repeat 2/1, 2/7 no growth 
- SBFT 2/7 without mass but poor study - Colonoscopy as outpatient with Dr. Asya Butcher 
- Continue zosyn started 2/1 for 2 week course with last dose on 2/14/18 
- off levophed - Received IVF 
- ID consulted - follow up with Dr. Valerie Marsh 
  
Pseudomonas Aeruginosa UTI (POA) - Urine cx 1/31 with 50K CFU pseudomonas - Continue zosyn started on 2/1 
  
Bladder wall thickening on CT (POA) - infection vs neoplasm vs due to bladder outlet obstruction, no hematuria - Urologist consulted - follow up with Dr. Louis Schirmer for outpatient cystoscopy  
  
Dehydration (POA) - improved with IVF 
- Monitor I/O 
  
Elevated transaminase possible due to sepsis - resolved 
  
CKD stage 4 - creatinine improving, on IVF 
- Continue to monitor renal function and urine output  
  
Anemia of chronic disease - H/H is trending down, no evidence of active bleeding 
- monitor H/H with CBC Monday 2/12/18 
  
Hypokalemia - monitor and replace as needed 
  
Left trochanter, Deep tissue pressure injury (POA) - Wound care consulted - continue wound care 
  
Debility - he said he usually stay in recliner and uses wheelchair with help 
- PT/OT 
 
FOLLOW-UP CARE RECOMMENDATIONS: 
 
APPOINTMENTS: 
Follow-up Information Follow up With Details Comments Contact Info Jewell Han MD Call As needed for primary care 1002 [facility] 102 1007 [facility] 
[phone] Shar Dwyer MD Schedule an appointment as soon as possible for a visit in 2 weeks For follow up of bladder thickening [facility] 200 [facility] 
[phone] Tom Weeks MD Schedule an appointment as soon as possible for a visit in 2 weeks For follow up colonoscopy 200 [facility] [address] [place] [address] 
[phone] Romeo Lyons MD Schedule an appointment as soon as possible for a visit in 1 week For follow up of blood stream infection [address] [facility] [address] [place] [address] 
[phone] It is very important that you keep follow-up appointment(s). Bring discharge papers, medication list (and/or medication bottles) to follow-up appointments for review by outpatient provider(s). FOLLOW-UP TESTS RECOMMENDED:  
· CBC and CMP on Monday ONGOING TREATMENT PLAN: Finish antibiotics, remove PICC, follow up with GI for colonoscopy to rule out abdominal structural abnormality as a cause of bacteremia, follow up with ID, follow up with Urology for bladder wall thickening. Specific symptoms to watch for: chest pain, shortness of breath, fever, chills, nausea, vomiting, diarrhea, change in mentation, falling, weakness, bleeding. DIET:  Cardiac Diet ACTIVITY:  Activity as tolerated WOUND CARE:  
· Continue Sport bed (or equivalent) · Continue Prevalon boots · Left trochanter:  Twice daily apply Venelex · Heels, buttocks and sacrum: Twice daily apply Aloe Vesta. GOALS OF CARE: 
x  Eventual return to home/independent/assisted living Long term SNF Hospice No rehospitalization Patient condition at discharge:  
Functional status 
x  Poor Deconditioned Independent Cognition Lucid  
x  Forgetful (some sensescence) Dementia Catheters/lines (plus indication)   Busby  
x  PICC   
  PEG   
    
 Code status 
x  Full code DNR Bahman Krause . . . . . . . . . . . . . . . . . . . . . . . . . . . . . . . . . . . . . . . . . . . . . . . . . . . . . . . . . . . . . . . . . . . . . . Bahman Krause CHRONIC MEDICAL CONDITIONS: 
Problem List as of 2/8/2018  Date Reviewed: 2/1/2018 Codes Class Noted - Resolved Dehydration ICD-10-CM: E86.0 ICD-9-CM: 276.51  1/31/2018 - Present Leukocytosis ICD-10-CM: U79.078 ICD-9-CM: 288.60  1/31/2018 - Present * (Principal)Shock ([facility] ICD-10-CM: R57.9 ICD-9-CM: 785.50  1/31/2018 - Present Hypotension ICD-10-CM: I95.9 ICD-9-CM: 458.9  1/31/2018 - Present Abnormal urinalysis ICD-10-CM: R82.90 ICD-9-CM: 791.9  1/31/2018 - Present SIRS (systemic inflammatory response syndrome) (HCC) ICD-10-CM: R65.10 ICD-9-CM: 995.90  1/31/2018 - Present Venous insufficiency of both lower extremities ICD-10-CM: I87.2 ICD-9-CM: 459.81  7/31/2017 - Present Hyperlipidemia ICD-10-CM: E78.5 ICD-9-CM: 272.4  7/31/2017 - Present Urinary retention ICD-10-CM: R33.9 ICD-9-CM: 788.20  7/31/2017 - Present Busby catheter in place ICD-10-CM: Z92.89 ICD-9-CM: V45.89  7/31/2017 - Present JAMES (acute kidney injury) ([facility] ICD-10-CM: N17.9 ICD-9-CM: 584.9  7/22/2017 - Present Status post hip surgery ICD-10-CM: Z98.890 ICD-9-CM: V45.89  5/31/2017 - Present Chronic pain of right knee ICD-10-CM: M25.561, G89.29 ICD-9-CM: 719.46, 338.29  5/31/2017 - Present CKD (chronic kidney disease) stage 3, GFR 30-59 ml/min ICD-10-CM: N18.3 ICD-9-CM: 585.3  4/30/2017 - Present Chronic anemia ICD-10-CM: D64.9 ICD-9-CM: 285.9  4/30/2017 - Present Debility ICD-10-CM: R53.81 ICD-9-CM: 799.3  4/30/2017 - Present UTI (urinary tract infection) ICD-10-CM: N39.0 ICD-9-CM: 599.0  11/7/2016 - Present Renal failure ICD-10-CM: N19 
ICD-9-CM: 855  1/23/2015 - Present Falls ICD-10-CM: W19. Zoanne Primrose ICD-9-CM: E888.9  1/23/2015 - Present Rhabdomyolysis ICD-10-CM: L90.58 ICD-9-CM: 728.88  1/23/2015 - Present Weight loss ICD-10-CM: R63.4 ICD-9-CM: 783.21  1/23/2015 - Present Night sweats ICD-10-CM: R61 
ICD-9-CM: 780.8  1/23/2015 - Present Anorexia ICD-10-CM: R63.0 ICD-9-CM: 783.0  1/23/2015 - Present Anemia ICD-10-CM: D64.9 ICD-9-CM: 285.9  1/23/2015 - Present Weakness ICD-10-CM: R53.1 ICD-9-CM: 780.79  3/20/2013 - Present Sinus tachycardia ICD-10-CM: R00.0 ICD-9-CM: 427.89  3/20/2013 - Present ARF (acute renal failure) (HCC) ICD-10-CM: N17.9 ICD-9-CM: 584.9  3/20/2013 - Present Hypercholesteremia ICD-10-CM: E78.00 ICD-9-CM: 272.0  Unknown - Present Hypertension ICD-10-CM: I10 
ICD-9-CM: 401.9  Unknown - Present Arthritis ICD-10-CM: M19.90 ICD-9-CM: 716.90  Unknown - Present Peripheral neuropathy ICD-10-CM: G62.9 ICD-9-CM: 356.9  3/20/2013 - Present Tremor ICD-10-CM: R25.1 ICD-9-CM: 781.0  11/26/2012 - Present RESOLVED: Urinary tract infection ICD-10-CM: N39.0 ICD-9-CM: 599.0  4/1/2017 - 4/7/2017 RESOLVED: Acute kidney injury ([facility] ICD-10-CM: N17.9 ICD-9-CM: 584.9  4/1/2017 - 4/7/2017 RESOLVED: Vomiting ICD-10-CM: R11.10 ICD-9-CM: 787.03  1/23/2015 - 11/10/2016 RESOLVED: Osteoarthritis of right knee (Chronic) ICD-10-CM: M17.11 ICD-9-CM: 715.96  2/11/2014 - 1/23/2015 RESOLVED: Nausea & vomiting ICD-10-CM: R11.2 ICD-9-CM: 787.01  3/20/2013 - 1/23/2015 RESOLVED: Orthostatic hypotension ICD-10-CM: I95.1 ICD-9-CM: 458.0  3/20/2013 - 1/23/2015 RESOLVED: Abdominal pain, RLQ ICD-10-CM: R10.31 ICD-9-CM: 789.03  3/20/2013 - 1/23/2015 RESOLVED: Hyperkalemia ICD-10-CM: E87.5 ICD-9-CM: 276.7  3/20/2013 - 1/23/2015 RESOLVED: Dehydration ICD-10-CM: E86.0 ICD-9-CM: 276.51  3/20/2013 - 1/23/2015 RESOLVED: Weight loss ICD-10-CM: R63.4 ICD-9-CM: 783.21  3/20/2013 - 1/23/2015 RESOLVED: Memory disorder ICD-10-CM: R41.3 ICD-9-CM: 780.93  Unknown - 1/23/2015 RESOLVED: Joint pain ICD-10-CM: M25.50 ICD-9-CM: 719.40  Unknown - 1/23/2015 RESOLVED: Insomnia ICD-10-CM: G47.00 ICD-9-CM: 780.52  3/20/2013 - 1/23/2015 Arrange follow-up with [facility] (Dr Amelia Rodriguez [phone]) regarding abnormal bladder. [employer] Announcement We are excited to announce that we are making your provider's discharge notes available to you in [employer]. You will see these notes when they are completed and signed by the physician that discharged you from your recent hospital stay. If you have any questions or concerns about any information you see in [employer], please call the Health Information Department where you were seen or reach out to your Primary Care Provider for more information about your plan of care. Introducing [facility]! [facility] introduces [employer] patient portal. Now you can access parts of your medical record, email your doctor's office, and request medication refills online. 1. In your internet browser, go to [email or web address] 2. Click on the First Time User? Click Here link in the Sign In box. You will see the New Member Sign Up page. 3. Enter your [employer] Access Code exactly as it appears below. You will not need to use this code after youve completed the sign-up process. If you do not sign up before the expiration date, you must request a new code. · [employer] Access Code: [email or web address] Expires: 5/7/2018  9:45 AM 
 
4. Enter the last four digits of your Social Security Number (xxxx) and Date of Birth (mm/dd/yyyy) as indicated and click Submit. You will be taken to the next sign-up page. 5. Create a [employer] ID. This will be your [employer] login ID and cannot be changed, so think of one that is secure and easy to remember. 6. Create a [employer] password. You can change your password at any time. 7. Enter your Password Reset Question and Answer. This can be used at a later time if you forget your password. 8. Enter your e-mail address. You will receive e-mail notification when new information is available in [address] 9. Click Sign Up. You can now view and download portions of your medical record. 10. Click the Download Summary menu link to download a portable copy of your medical information. If you have questions, please visit the Frequently Asked Questions section of the [employer] website. Remember, [employer] is NOT to be used for urgent needs. For medical emergencies, dial 911. Now available from your iPhone and Android! Unresulted Labs-Please follow up with your PCP about these lab tests Order Current Status CULTURE, BLOOD, PAIRED Preliminary result Providers Seen During Your Hospitalization Provider Specialty Primary office phone Rai Perez MD Emergency Medicine [phone] Fran Rhoades MD Family Practice [phone] Rashaun Sellers MD Internal Medicine [phone] Ry Lacy MD Internal Medicine [phone] Your Primary Care Physician (PCP) Primary Care Physician Office Phone Office Fax Valerie Perez [phone] You are allergic to the following Allergen Reactions Ciprofloxacin Unknown (comments) Per [facility]. Nsaids (Non-Steroidal Anti-Inflammatory Drug) Unknown (comments) Per [employer]. Recent Documentation Height Weight BMI Smoking Status 1.702 m 76.4 kg 26.38 kg/m2 Former Smoker Emergency Contacts Name Discharge Info Relation Home Work Mobile Elyse cMcollum  Spouse [3] [phone] [address] [facility] CAREGIVER [3] Child [2] [phone] Patient Belongings The following personal items are in your possession at time of discharge: 
  Dental Appliances: Lowers, Uppers, With patient  Visual Aid: None      Home Medications: None   Jewelry: None  Clothing: Robe, Slippers    Other Valuables: None Please provide this summary of care documentation to your next provider. Signatures-by signing, you are acknowledging that this After Visit Summary has been reviewed with you and you have received a copy. Patient Signature:  ____________________________________________________________ Date:  ____________________________________________________________  
  
[facility] Provider Signature:  ____________________________________________________________ Date:  ____________________________________________________________

## 2018-01-31 NOTE — ED TRIAGE NOTES
Patient arrives via EMS from [facility]) after he complained to staff that he \"hurt all over\" and appeared to be a little weaker than normal. Patient denies any pain on arrival to ER. He has dementia and is oriented to baseline according to staff/EMS. Patient in no distress.  On arrival.

## 2018-02-01 LAB
ABO + RH BLD: NORMAL
ALBUMIN SERPL-MCNC: 2.1 G/DL (ref 3.5–5)
ALBUMIN/GLOB SERPL: 0.6 {RATIO} (ref 1.1–2.2)
ALP SERPL-CCNC: 532 U/L (ref 45–117)
ALT SERPL-CCNC: 114 U/L (ref 12–78)
ANION GAP SERPL CALC-SCNC: 13 MMOL/L (ref 5–15)
AST SERPL-CCNC: 101 U/L (ref 15–37)
ATRIAL RATE: 79 BPM
BASOPHILS # BLD: 0 K/UL (ref 0–0.1)
BASOPHILS NFR BLD: 0 % (ref 0–1)
BILIRUB SERPL-MCNC: 0.9 MG/DL (ref 0.2–1)
BLOOD GROUP ANTIBODIES SERPL: NORMAL
BUN SERPL-MCNC: 63 MG/DL (ref 6–20)
BUN/CREAT SERPL: 28 (ref 12–20)
CALCIUM SERPL-MCNC: 7.1 MG/DL (ref 8.5–10.1)
CALCULATED P AXIS, ECG09: 79 DEGREES
CALCULATED R AXIS, ECG10: -47 DEGREES
CALCULATED T AXIS, ECG11: -2 DEGREES
CHLORIDE SERPL-SCNC: 109 MMOL/L (ref 97–108)
CO2 SERPL-SCNC: 23 MMOL/L (ref 21–32)
CREAT SERPL-MCNC: 2.26 MG/DL (ref 0.7–1.3)
DIAGNOSIS, 93000: NORMAL
DIFFERENTIAL METHOD BLD: ABNORMAL
EOSINOPHIL # BLD: 0.3 K/UL (ref 0–0.4)
EOSINOPHIL NFR BLD: 1 % (ref 0–7)
ERYTHROCYTE [DISTWIDTH] IN BLOOD BY AUTOMATED COUNT: 21.1 % (ref 11.5–14.5)
GLOBULIN SER CALC-MCNC: 3.7 G/DL (ref 2–4)
GLUCOSE SERPL-MCNC: 101 MG/DL (ref 65–100)
HCT VFR BLD AUTO: 26.7 % (ref 36.6–50.3)
HGB BLD-MCNC: 8.7 G/DL (ref 12.1–17)
IMM GRANULOCYTES # BLD: 0 K/UL
IMM GRANULOCYTES NFR BLD AUTO: 0 %
LYMPHOCYTES # BLD: 0.6 K/UL (ref 0.8–3.5)
LYMPHOCYTES NFR BLD: 2 % (ref 12–49)
MCH RBC QN AUTO: 29.4 PG (ref 26–34)
MCHC RBC AUTO-ENTMCNC: 32.6 G/DL (ref 30–36.5)
MCV RBC AUTO: 90.2 FL (ref 80–99)
METAMYELOCYTES NFR BLD MANUAL: 1 %
MONOCYTES # BLD: 1.5 K/UL (ref 0–1)
MONOCYTES NFR BLD: 5 % (ref 5–13)
NEUTS BAND NFR BLD MANUAL: 1 % (ref 0–6)
NEUTS SEG # BLD: 26.9 K/UL (ref 1.8–8)
NEUTS SEG NFR BLD: 90 % (ref 32–75)
NRBC # BLD: 0 K/UL (ref 0–0.01)
NRBC BLD-RTO: 0 PER 100 WBC
P-R INTERVAL, ECG05: 178 MS
PLATELET # BLD AUTO: 246 K/UL (ref 150–400)
PMV BLD AUTO: 11.4 FL (ref 8.9–12.9)
POTASSIUM SERPL-SCNC: 2.9 MMOL/L (ref 3.5–5.1)
PROT SERPL-MCNC: 5.8 G/DL (ref 6.4–8.2)
Q-T INTERVAL, ECG07: 432 MS
QRS DURATION, ECG06: 120 MS
QTC CALCULATION (BEZET), ECG08: 495 MS
RBC # BLD AUTO: 2.96 M/UL (ref 4.1–5.7)
RBC MORPH BLD: ABNORMAL
SODIUM SERPL-SCNC: 145 MMOL/L (ref 136–145)
SPECIMEN EXP DATE BLD: NORMAL
VENTRICULAR RATE, ECG03: 79 BPM
WBC # BLD AUTO: 29.6 K/UL (ref 4.1–11.1)
WBC MORPH BLD: ABNORMAL

## 2018-02-01 PROCEDURE — 74011000258 HC RX REV CODE- 258: Performed by: FAMILY MEDICINE

## 2018-02-01 PROCEDURE — 77030032490 HC SLV COMPR SCD KNE COVD -B

## 2018-02-01 PROCEDURE — 74011000250 HC RX REV CODE- 250: Performed by: INTERNAL MEDICINE

## 2018-02-01 PROCEDURE — 74011250636 HC RX REV CODE- 250/636: Performed by: INTERNAL MEDICINE

## 2018-02-01 PROCEDURE — 65610000006 HC RM INTENSIVE CARE

## 2018-02-01 PROCEDURE — 74011000250 HC RX REV CODE- 250: Performed by: FAMILY MEDICINE

## 2018-02-01 PROCEDURE — 85025 COMPLETE CBC W/AUTO DIFF WBC: CPT | Performed by: FAMILY MEDICINE

## 2018-02-01 PROCEDURE — 80053 COMPREHEN METABOLIC PANEL: CPT | Performed by: FAMILY MEDICINE

## 2018-02-01 PROCEDURE — 86900 BLOOD TYPING SEROLOGIC ABO: CPT | Performed by: FAMILY MEDICINE

## 2018-02-01 PROCEDURE — 74011250636 HC RX REV CODE- 250/636: Performed by: FAMILY MEDICINE

## 2018-02-01 PROCEDURE — 87040 BLOOD CULTURE FOR BACTERIA: CPT | Performed by: HOSPITALIST

## 2018-02-01 PROCEDURE — 77030020186 HC BOOT HL PROTCT SAGE -B

## 2018-02-01 PROCEDURE — 36415 COLL VENOUS BLD VENIPUNCTURE: CPT | Performed by: FAMILY MEDICINE

## 2018-02-01 RX ORDER — SODIUM CHLORIDE 0.9 % (FLUSH) 0.9 %
5-10 SYRINGE (ML) INJECTION EVERY 8 HOURS
Status: DISCONTINUED | OUTPATIENT
Start: 2018-02-01 | End: 2018-02-08 | Stop reason: HOSPADM

## 2018-02-01 RX ORDER — SODIUM CHLORIDE 0.9 % (FLUSH) 0.9 %
10-30 SYRINGE (ML) INJECTION AS NEEDED
Status: DISCONTINUED | OUTPATIENT
Start: 2018-02-01 | End: 2018-02-08 | Stop reason: HOSPADM

## 2018-02-01 RX ORDER — HEPARIN SODIUM 5000 [USP'U]/ML
5000 INJECTION, SOLUTION INTRAVENOUS; SUBCUTANEOUS EVERY 12 HOURS
Status: DISCONTINUED | OUTPATIENT
Start: 2018-02-01 | End: 2018-02-08 | Stop reason: HOSPADM

## 2018-02-01 RX ORDER — SODIUM CHLORIDE 0.9 % (FLUSH) 0.9 %
10 SYRINGE (ML) INJECTION EVERY 24 HOURS
Status: DISCONTINUED | OUTPATIENT
Start: 2018-02-01 | End: 2018-02-08 | Stop reason: HOSPADM

## 2018-02-01 RX ORDER — POTASSIUM CHLORIDE 14.9 MG/ML
10 INJECTION INTRAVENOUS
Status: COMPLETED | OUTPATIENT
Start: 2018-02-01 | End: 2018-02-03

## 2018-02-01 RX ORDER — SODIUM CHLORIDE 0.9 % (FLUSH) 0.9 %
10 SYRINGE (ML) INJECTION AS NEEDED
Status: DISCONTINUED | OUTPATIENT
Start: 2018-02-01 | End: 2018-02-08 | Stop reason: HOSPADM

## 2018-02-01 RX ORDER — FAMOTIDINE 10 MG/ML
20 INJECTION INTRAVENOUS DAILY
Status: DISCONTINUED | OUTPATIENT
Start: 2018-02-01 | End: 2018-02-04

## 2018-02-01 RX ORDER — SODIUM CHLORIDE 9 MG/ML
1000 INJECTION, SOLUTION INTRAVENOUS ONCE
Status: COMPLETED | OUTPATIENT
Start: 2018-02-01 | End: 2018-02-01

## 2018-02-01 RX ORDER — VANCOMYCIN/0.9 % SOD CHLORIDE 1 G/100 ML
1000 PLASTIC BAG, INJECTION (ML) INTRAVENOUS
Status: DISCONTINUED | OUTPATIENT
Start: 2018-02-02 | End: 2018-02-02

## 2018-02-01 RX ORDER — SODIUM CHLORIDE 0.9 % (FLUSH) 0.9 %
10-40 SYRINGE (ML) INJECTION EVERY 8 HOURS
Status: DISCONTINUED | OUTPATIENT
Start: 2018-02-01 | End: 2018-02-08 | Stop reason: HOSPADM

## 2018-02-01 RX ORDER — SODIUM CHLORIDE 0.9 % (FLUSH) 0.9 %
5-10 SYRINGE (ML) INJECTION AS NEEDED
Status: DISCONTINUED | OUTPATIENT
Start: 2018-02-01 | End: 2018-02-08 | Stop reason: HOSPADM

## 2018-02-01 RX ORDER — SODIUM CHLORIDE 0.9 % (FLUSH) 0.9 %
20 SYRINGE (ML) INJECTION EVERY 24 HOURS
Status: DISCONTINUED | OUTPATIENT
Start: 2018-02-01 | End: 2018-02-08 | Stop reason: HOSPADM

## 2018-02-01 RX ADMIN — Medication 20 ML: at 08:00

## 2018-02-01 RX ADMIN — Medication 10 ML: at 08:00

## 2018-02-01 RX ADMIN — HEPARIN SODIUM 5000 UNITS: 5000 INJECTION, SOLUTION INTRAVENOUS; SUBCUTANEOUS at 22:00

## 2018-02-01 RX ADMIN — HEPARIN SODIUM 5000 UNITS: 5000 INJECTION, SOLUTION INTRAVENOUS; SUBCUTANEOUS at 12:09

## 2018-02-01 RX ADMIN — SODIUM CHLORIDE 1000 ML: 900 INJECTION, SOLUTION INTRAVENOUS at 00:50

## 2018-02-01 RX ADMIN — SODIUM CHLORIDE 125 ML/HR: 900 INJECTION, SOLUTION INTRAVENOUS at 03:55

## 2018-02-01 RX ADMIN — Medication 10 ML: at 14:00

## 2018-02-01 RX ADMIN — POTASSIUM CHLORIDE 10 MEQ: 200 INJECTION, SOLUTION INTRAVENOUS at 14:00

## 2018-02-01 RX ADMIN — Medication 10 ML: at 22:00

## 2018-02-01 RX ADMIN — PIPERACILLIN SODIUM,TAZOBACTAM SODIUM 6.75 G: 3; .375 INJECTION, POWDER, FOR SOLUTION INTRAVENOUS at 00:50

## 2018-02-01 RX ADMIN — NOREPINEPHRINE BITARTRATE 5 MCG/MIN: 1 INJECTION, SOLUTION, CONCENTRATE INTRAVENOUS at 00:45

## 2018-02-01 RX ADMIN — FAMOTIDINE 20 MG: 10 INJECTION, SOLUTION INTRAVENOUS at 12:09

## 2018-02-01 RX ADMIN — POTASSIUM CHLORIDE 10 MEQ: 200 INJECTION, SOLUTION INTRAVENOUS at 15:00

## 2018-02-01 NOTE — PROGRESS NOTES
Admission Medication Reconciliation:    Information obtained from:  medication list from [facility] assisted living    Comments/Recommendations: All medications/allergies have been reviewed and updated. Due to patient's condition, could not review last administration times of PRN medications. Changes made to Prior to Admission (PTA) Medication List:   ?   Medications Added:   - None   ? Medications Changed:   - APAP dose and frequency  - furosemide dose and frequency  ? Medications Removed:   - None       Significant PMH/Disease States:   Past Medical History:   Diagnosis Date    Arrhythmia     RAPID HEARTBEAT    Arthritis     Constipation     Hearing loss     Heart disease     Hypercholesteremia     Hypertension     Joint pain     Memory disorder        Chief Complaint for this Admission:    Chief Complaint   Patient presents with    Generalized Body Aches       Allergies:  Ciprofloxacin and Nsaids (non-steroidal anti-inflammatory drug)    Prior to Admission Medications:   Prior to Admission Medications   Prescriptions Last Dose Informant Patient Reported? Taking? Menthol-Zinc Oxide (RISAMINE) 0.44-20.6 % oint   Yes No   Sig: Apply  to affected area as needed (skin irritation). In addition to scheduled regimen. Indications: DIAPER RASH, SKIN IRRITATION   Menthol-Zinc Oxide (RISAMINE) 0.44-20.6 % oint 1/31/2018 at Unknown time  Yes Yes   Sig: Apply  to affected area two (2) times a day. Apply to buttocks. SENEXON-S 8.6-50 mg per tablet 1/31/2018 at Unknown time  No Yes   Sig: TAKE 1 TABLET BY MOUTH TWICE DAILY AT 9A AND 9P DX:CONSTIPATION *STOP LAXATIVES/STOOL SOFTNERS IF > 2BM IN 1 DAY   Patient taking differently: TAKE 1 TABLET BY MOUTH TWICE DAILY AT 9am AND 9p PRN   acetaminophen (TYLENOL) 325 mg tablet   Yes Yes   Sig: Take 650 mg by mouth every eight (8) hours as needed for Pain. acetaminophen (TYLENOL) 500 mg tablet   Yes Yes   Sig: Take 500 mg by mouth two (2) times a day. fluticasone (FLONASE) 50 mcg/actuation nasal spray 2018 at Unknown time  Yes Yes   Si Sprays by Both Nostrils route daily. furosemide (LASIX) 40 mg tablet   Yes Yes   Sig: Take 80 mg by mouth daily. furosemide (LASIX) 40 mg tablet   Yes Yes   Sig: Take 40 mg by mouth every evening. metoprolol (LOPRESSOR) 25 mg tablet 2018 at Unknown time  Yes Yes   Sig: Take 12.5 mg by mouth two (2) times a day. nystatin (MYCOSTATIN) topical cream 2018 at Unknown time  Yes Yes   Sig: Apply  to affected area three (3) times daily. Apply to groin and sacrum every shift (7-3, 3-11, 11-7)   pantoprazole (PROTONIX) 40 mg tablet 2018 at Unknown time  No Yes   Sig: Take 1 tablet by mouth Before breakfast and dinner. rosuvastatin (CRESTOR) 20 mg tablet 2018 at Unknown time  Yes Yes   Sig: Take 20 mg by mouth nightly. Facility-Administered Medications: None     Thank you for allowing pharmacy to participate in the coordination of this patient's care. If you have any other questions, please contact the medication reconciliation pharmacist at [phone]. Saima Guaman, Pharm. D.

## 2018-02-01 NOTE — ED NOTES
Patient reassessed at this time. He has Zosyn currently running and has gotten 1L of fluid at this time. Patient continues to have a down trending BP at this time MD is aware. Patient is to get another liter of fluid over 2 hours per Dr. Lewis Rivas because of the concern for his cardiac function. This will be hung shortly. Patient continues to have occasional complaints of body aches, but this is mostly when he is being repositioned or turned. Will continue to monitor. CM x 3. Call bell within reach.

## 2018-02-01 NOTE — CDMP QUERY
Dear Hospitalists,    Please clarify if this patient is (was) being treated/managed for:     => Hypokalemia in the setting of Potassium 2.9 requiring lab monitoring  => Other explanation of clinical findings  => Unable to determine (no explanation for clinical findings)    The medical record reflects the following clinical findings, treatment, and risk factors. Risk Factors:  adm with sepsis  Clinical Indicators:  Potassium 2.9  Treatment: lab monitoring    Please clarify and document your clinical opinion in the progress notes and discharge summary including the definitive and/or presumptive diagnosis, (suspected or probable), related to the above clinical findings. Please include clinical findings supporting your diagnosis.     Thank You  Carol Rivas,MSN,BSN,RN,[facility]  [phone]

## 2018-02-01 NOTE — PROGRESS NOTES
Initial Pulmonary / Critical Care Consultation    Assessment / Plan:      Sepsis  UTI with bladder wall thickening on abd ct  On levophed for bp support  Lactic acid normal    CKD    Elevated LFT's transaminases and alk phos with normal bili  Abd ct with presence of gall stones but no obvious obstruction    Elevated BNP but clinically appears dry    Dementia    --on empiric abx and follow up cultures - hopefully can stop vanc soon  --Echo  --volume expansion and advance diet  --monitor renal function and LFT's  --wean off levophed as able  --out of ICU when off of pressors  --Pepcid and sq heparin  --Urology eval requested by primary team      History / Subjective:  Reason:  Sepsis  Requesting Provider:  Dr Maggie Marie is a 81 y.o.  male who  has a past medical history of Arrhythmia; Arthritis; Constipation; Hearing loss; Heart disease; Hypercholesteremia; Hypertension; Joint pain; and Memory disorder. admitted 1/31/2018 with body aches and elevated wbc. Work up included urinalysis suggestive of UTI, CKD, Abd and pelvic CT with gall stones and no obstruction and thickened bladder wall    He was hypotensive and received IVF and started on abx with zosyn and vanc. BNP was elevated. Previous echo from 2016 with preserved LVEF. He had a CVL placed and was started on levophed. Influenza studies were negative    He denies cough or sputum production  No abd or back pain  No chest pain  No nausea or vomiting  He says he feels thirsty    Allergies   Allergen Reactions    Ciprofloxacin Unknown (comments)     Per [facility] paperwork.  Nsaids (Non-Steroidal Anti-Inflammatory Drug) Unknown (comments)     Per [facility] paperwork.      Past Medical History:   Diagnosis Date    Arrhythmia     RAPID HEARTBEAT    Arthritis     Constipation     Hearing loss     Heart disease     Hypercholesteremia     Hypertension     Joint pain     Memory disorder       Past Surgical History:   Procedure Laterality Date    HX BACK SURGERY  1974    HX CATARACT REMOVAL      LEFT    HX COLONOSCOPY      HX HERNIA REPAIR  1975    double    HX TONSILLECTOMY      UPPER GI ENDOSCOPY,BIOPSY  3/22/2013           Prior to Admission medications    Medication Sig Start Date End Date Taking? Authorizing Provider   furosemide (LASIX) 40 mg tablet Take 80 mg by mouth daily. Yes Historical Provider   furosemide (LASIX) 40 mg tablet Take 40 mg by mouth every evening. Yes Historical Provider   acetaminophen (TYLENOL) 500 mg tablet Take 500 mg by mouth two (2) times a day. Yes Historical Provider   acetaminophen (TYLENOL) 325 mg tablet Take 650 mg by mouth every eight (8) hours as needed for Pain. Yes Historical Provider   Menthol-Zinc Oxide (RISAMINE) 0.44-20.6 % oint Apply  to affected area two (2) times a day. Apply to buttocks. Yes Historical Provider   nystatin (MYCOSTATIN) topical cream Apply  to affected area three (3) times daily. Apply to groin and sacrum every shift (7-3, 3-11, 11-7)   Yes Historical Provider   SENEXON-S 8.6-50 mg per tablet TAKE 1 TABLET BY MOUTH TWICE DAILY AT 9A AND 9P DX:CONSTIPATION *STOP LAXATIVES/STOOL SOFTNERS IF > 2BM IN 1 DAY  Patient taking differently: TAKE 1 TABLET BY MOUTH TWICE DAILY AT 9am AND 9p PRN 7/13/17  Yes Syl Lim NP   fluticasone (FLONASE) 50 mcg/actuation nasal spray 2 Sprays by Both Nostrils route daily. Yes Historical Provider   pantoprazole (PROTONIX) 40 mg tablet Take 1 tablet by mouth Before breakfast and dinner. 1/28/15  Yes Sallye Essex, MD   metoprolol (LOPRESSOR) 25 mg tablet Take 12.5 mg by mouth two (2) times a day. Yes Historical Provider   rosuvastatin (CRESTOR) 20 mg tablet Take 20 mg by mouth nightly. Yes Historical Provider   Menthol-Zinc Oxide (RISAMINE) 0.44-20.6 % oint Apply  to affected area as needed (skin irritation). In addition to scheduled regimen.   Indications: DIAPER RASH, SKIN IRRITATION    Historical Provider      Family History   Problem Relation Age of Onset    Stroke Mother     Heart Disease Father     Cancer Brother      lung    Other Brother      NON ALCOHOLIC CIRRHOSIS     Social History   Substance Use Topics    Smoking status: Former Smoker     Years: 16.00     Quit date: 1971    Smokeless tobacco: Not on file    Alcohol use No      ROS:  A comprehensive review of systems was negative except for that written in the HPI. Objective:  Patient Vitals for the past 4 hrs:   BP Temp Pulse Resp SpO2   18 0900 - (P) 97.8 °F (36.6 °C) - - -   18 0700 121/60 - 94 18 90 %   18 0630 116/55 - 94 13 100 %   18 0600 126/58 - 89 12 99 %     Temp (24hrs), Av.1 °F (36.7 °C), Min:97.7 °F (36.5 °C), Max:98.8 °F (37.1 °C)    CVP:          Intake/Output Summary (Last 24 hours) at 18 0943  Last data filed at 18 0905   Gross per 24 hour   Intake                0 ml   Output              800 ml   Net             -800 ml     Blood Sugar:  Glucose   Date Value Ref Range Status   2018 101 (H) 65 - 100 mg/dL Final   2018 126 (H) 65 - 100 mg/dL Final   2017 87 65 - 100 mg/dL Final   2017 83 65 - 100 mg/dL Final   2017 132 (H) 65 - 100 mg/dL Final     Exam:  NAD  Alert  MM dry  Anicteric  Trachea midline  No accessory use  Symmetrical chest expansion  Diminished bs at bases  RRR  Soft NT no guarding or rebound  Warm and dry  No edema  Moves all extremities    Lab data was reviewed. Radiology images were independently viewed and available reports were reviewed.     CXR - cardiomegally, scoliosis, LL atx    Abd and pelvic CT - gall stones without ductal dilitation, bladder wall thickening    Recent Results (from the past 12 hour(s))   TYPE & SCREEN    Collection Time: 18  6:11 AM   Result Value Ref Range    Crossmatch Expiration 2018     ABO/Rh(D) B NEGATIVE     Antibody screen NEG    METABOLIC PANEL, COMPREHENSIVE    Collection Time: 18  6:11 AM   Result Value Ref Range    Sodium 145 136 - 145 mmol/L    Potassium 2.9 (L) 3.5 - 5.1 mmol/L    Chloride 109 (H) 97 - 108 mmol/L    CO2 23 21 - 32 mmol/L    Anion gap 13 5 - 15 mmol/L    Glucose 101 (H) 65 - 100 mg/dL    BUN 63 (H) 6 - 20 MG/DL    Creatinine 2.26 (H) 0.70 - 1.30 MG/DL    BUN/Creatinine ratio 28 (H) 12 - 20      GFR est AA 34 (L) >60 ml/min/1.73m2    GFR est non-AA 28 (L) >60 ml/min/1.73m2    Calcium 7.1 (L) 8.5 - 10.1 MG/DL    Bilirubin, total 0.9 0.2 - 1.0 MG/DL    ALT (SGPT) 114 (H) 12 - 78 U/L    AST (SGOT) 101 (H) 15 - 37 U/L    Alk. phosphatase 532 (H) 45 - 117 U/L    Protein, total 5.8 (L) 6.4 - 8.2 g/dL    Albumin 2.1 (L) 3.5 - 5.0 g/dL    Globulin 3.7 2.0 - 4.0 g/dL    A-G Ratio 0.6 (L) 1.1 - 2.2     CBC WITH AUTOMATED DIFF    Collection Time: 02/01/18  6:11 AM   Result Value Ref Range    WBC 29.6 (H) 4.1 - 11.1 K/uL    RBC 2.96 (L) 4.10 - 5.70 M/uL    HGB 8.7 (L) 12.1 - 17.0 g/dL    HCT 26.7 (L) 36.6 - 50.3 %    MCV 90.2 80.0 - 99.0 FL    MCH 29.4 26.0 - 34.0 PG    MCHC 32.6 30.0 - 36.5 g/dL    RDW 21.1 (H) 11.5 - 14.5 %    PLATELET 051 252 - 059 K/uL    MPV 11.4 8.9 - 12.9 FL    NRBC 0.0 0  WBC    ABSOLUTE NRBC 0.00 0.00 - 0.01 K/uL    NEUTROPHILS 90 (H) 32 - 75 %    BAND NEUTROPHILS 1 0 - 6 %    LYMPHOCYTES 2 (L) 12 - 49 %    MONOCYTES 5 5 - 13 %    EOSINOPHILS 1 0 - 7 %    BASOPHILS 0 0 - 1 %    METAMYELOCYTES 1 (H) 0 %    IMMATURE GRANULOCYTES 0 %    ABS. NEUTROPHILS 26.9 (H) 1.8 - 8.0 K/UL    ABS. LYMPHOCYTES 0.6 (L) 0.8 - 3.5 K/UL    ABS. MONOCYTES 1.5 (H) 0.0 - 1.0 K/UL    ABS. EOSINOPHILS 0.3 0.0 - 0.4 K/UL    ABS. BASOPHILS 0.0 0.0 - 0.1 K/UL    ABS. IMM.  GRANS. 0.0 K/UL    DF MANUAL      RBC COMMENTS ANISOCYTOSIS  2+        RBC COMMENTS SCHISTOCYTES  1+        RBC COMMENTS POIKILOCYTOSIS  1+        WBC COMMENTS TOXIC GRANULATION       All Micro Results     Procedure Component Value Units Date/Time    CULTURE, BLOOD [[id number]] Collected:  01/31/18 1928    Order Status:  Completed Specimen:  Blood from Blood Updated:  02/01/18 0609     Special Requests: NO SPECIAL REQUESTS        Culture result: NO GROWTH AFTER 9 HOURS       CULTURE, BLOOD [[id number]]  (Abnormal) Collected:  01/31/18 2025    Order Status:  Completed Specimen:  Blood from Blood Updated:  02/01/18 0547     Special Requests: NO SPECIAL REQUESTS        Culture result: LARGE  GRAM POSITIVE RODS  (POSSIBLE ANAEROBIC)  GROWING IN 1 OF 2 BOTTLES DRAWN  (SITE= R AC)   (A)            PRELIMINARY REPORT OF  GRAM POSITIVE RODS  GROWING IN 1 OF 2 BOTTLES DRAWN  CALLED TO AND READ BACK BY  ENIO PITTS RN AT 5000 2/1/18.  LWY   (A)              REMAINING BOTTLE(S) HAS/HAVE NO GROWTH SO FAR    CULTURE, URINE [[id number]] Collected:  01/31/18 2048    Order Status:  Completed Specimen:  Urine from Cath Urine Updated:  01/31/18 2240    INFLUENZA A & B AG (RAPID TEST) [[id number]] Collected:  01/31/18 1928    Order Status:  Completed Specimen:  Nasopharyngeal from Nasal washing Updated:  01/31/18 2005     Influenza A Antigen NEGATIVE         Influenza B Antigen NEGATIVE              Lima Cruz MD

## 2018-02-01 NOTE — PROGRESS NOTES
Pharmacist Note - Vancomycin Dosing    Consult provided for this 81 y.o. male for indication of sepsis. Antibiotic regimen(s): zosyn and vancomycin    Recent Labs      18   WBC  34.1*   CREA  2.99*   BUN  83*     Frequency of BMP: daily  Height: 170.2 cm  Weight: 69.8 kg  Est CrCl: ~18 ml/min  Temp (24hrs), Av.2 °F (36.8 °C), Min:97.7 °F (36.5 °C), Max:98.8 °F (37.1 °C)    Cultures:   blood   urine    Goal trough = 15 - 20 mcg/mL    Therapy will be initiated with a loading dose of 1750 mg IV x 1 to be followed by a maintenance dose of 1000 mg IV every 36 hours. Pharmacy to follow patient daily and order levels / make dose adjustments as appropriate.

## 2018-02-01 NOTE — PROGRESS NOTES
Hospitalist Progress Note  Bridgett Grande MD  Answering service: [phone] from in house phone  Cell: [phone]      Date of Service:  2018  NAME:  Idalia Beasley  :  1935  MRN:  [id number]      Admission Summary: An 52-year-old white male with past medical history of arthritis, arrhythmia, constipation, hearing loss, hypertension, hyperlipidemia, joint pain, anemia, memory loss, who presented to the emergency department via EMS from assisted living facility with a chief complaint of generalized body aches.       Interval history / Subjective:   Lower extremities pain, no chest pain or shortness of breath, no RUQ pain     Assessment & Plan:     Septic shock due to UTI POA  -continue zosyn, vancomycin,  IVF and levophed, monitor BP, wean off pressor  -blood cx grow gram positive rods 1 of 2 bottles  -urine cx grow gram negative rods [id number]  -repeat blood cx    Bladder wall thickening on CT   -infection vs neoplasm   -no hematuria   -urologist is consulted    Dehydration   -continue normal saline  -monitor I/O    Elevated transaminase possible due to sepsis  -improving, continue IVF    Cholelithiasis on CT  -no RUQ tenderness     CKD stage III  -creatinine improving, on IVF  -monitor renal function and urine output     Anemia of chronic disease   -no evidence of active bleeding  -monitor H/H    Hypokalemia   -replace with iv kcl and repeat in am    Pressure ulcer at left heel and sacral area  -consult wound care nurse       Code status: Full Code  DVT prophylaxis: Heparin    Care Plan discussed with: Patient/Family, Nurse and   Disposition: TBD     Hospital Problems  Date Reviewed: 2018          Codes Class Noted POA    Dehydration ICD-10-CM: E86.0  ICD-9-CM: 276.51  2018 Unknown        Leukocytosis ICD-10-CM: D72.267  ICD-9-CM: 288.60  2018 Unknown        * (Principal)Shock ([facility] [address] ICD-10-CM: R57.9  ICD-9-CM: 785.50  1/31/2018 Unknown        Hypotension ICD-10-CM: I95.9  ICD-9-CM: 458.9  1/31/2018 Unknown        Abnormal urinalysis ICD-10-CM: R82.90  ICD-9-CM: 791.9  1/31/2018 Unknown        SIRS (systemic inflammatory response syndrome) ([facility] [address] ICD-10-CM: R65.10  ICD-9-CM: 995.90  1/31/2018 Unknown              Vital Signs:    Last 24hrs VS reviewed since prior progress note. Most recent are:  Visit Vitals    /67    Pulse 87    Temp 97.8 °F (36.6 °C)    Resp 15    Ht 5' 7\" (1.702 m)    Wt 69.8 kg (153 lb 14.1 oz)    SpO2 100%    BMI 24.1 kg/m2         Intake/Output Summary (Last 24 hours) at 02/01/18 1447  Last data filed at 02/01/18 0905   Gross per 24 hour   Intake           635.42 ml   Output              800 ml   Net          -164.58 ml        Physical Examination:             Constitutional:  No acute distress, cooperative, pleasant    ENT:  Oral mucous moist, oropharynx benign. Neck supple,    Resp:  Decrease bronchial breath sound bilaterally. No wheezing/rhonchi/rales. No accessory muscle use   CV:  Regular rhythm, normal rate, no murmurs, gallops, rubs    GI:  Soft, non distended, non tender.  normoactive bowel sounds, no hepatosplenomegaly     Musculoskeletal:  No edema    Neurologic:  Conscious and alert, oriented to place and person, moves all extremities     Skin:  left heel and sacral area pressure ulcer       Data Review:    Review and/or order of clinical lab test      Labs:     Recent Labs      02/01/18   0611  01/31/18 1928   WBC  29.6*  34.1*   HGB  8.7*  9.8*   HCT  26.7*  29.6*   PLT  246  234     Recent Labs      02/01/18   0611  01/31/18 1928   NA  145  138   K  2.9*  3.9   CL  109*  98   CO2  23  27   BUN  63*  83*   CREA  2.26*  2.99*   GLU  101*  126*   CA  7.1*  7.8*     Recent Labs      02/01/18   0611 01/31/18 1928   SGOT  101*  141*   ALT  114*  155*   AP  532*  704*   TBILI  0.9  1.2*   TP  5.8*  6.9   ALB  2.1*  2.5*   GLOB  3.7  4.4*     Recent Labs 01/31/18 1928   INR  1.1   PTP  11.6*   APTT  38.6*      No results for input(s): FE, TIBC, PSAT, FERR in the last 72 hours. Lab Results   Component Value Date/Time    Folate 8.4 01/23/2015 07:19 PM      No results for input(s): PH, PCO2, PO2 in the last 72 hours.   Recent Labs      01/31/18 1928   TROIQ  <0.04     No results found for: CHOL, CHOLX, CHLST, CHOLV, HDL, LDL, LDLC, DLDLP, TGLX, TRIGL, TRIGP, CHHD, CHHDX  Lab Results   Component Value Date/Time    Glucose (POC) 118 01/27/2015 12:15 PM    Glucose (POC) 117 01/27/2015 07:26 AM    Glucose (POC) 168 01/26/2015 09:03 PM    Glucose (POC) 146 01/26/2015 04:26 PM    Glucose (POC) 144 01/26/2015 11:27 AM     Lab Results   Component Value Date/Time    Color DARK YELLOW 01/31/2018 08:48 PM    Appearance TURBID 01/31/2018 08:48 PM    Specific gravity 1.015 01/31/2018 08:48 PM    pH (UA) 5.5 01/31/2018 08:48 PM    Protein 30 01/31/2018 08:48 PM    Glucose NEGATIVE  01/31/2018 08:48 PM    Ketone NEGATIVE  01/31/2018 08:48 PM    Bilirubin NEGATIVE  01/31/2018 08:48 PM    Urobilinogen 1.0 01/31/2018 08:48 PM    Nitrites NEGATIVE  01/31/2018 08:48 PM    Leukocyte Esterase LARGE 01/31/2018 08:48 PM    Epithelial cells FEW 01/31/2018 08:48 PM    Bacteria NEGATIVE  01/31/2018 08:48 PM    WBC >100 01/31/2018 08:48 PM    RBC 0-5 01/31/2018 08:48 PM         Medications Reviewed:     Current Facility-Administered Medications   Medication Dose Route Frequency    sodium chloride (NS) flush 5-10 mL  5-10 mL IntraVENous Q8H    sodium chloride (NS) flush 5-10 mL  5-10 mL IntraVENous PRN    NOREPINephrine (LEVOPHED) 32 mg in dextrose 5% 250 mL infusion  2-16 mcg/min IntraVENous TITRATE    [START ON 2/2/2018] vancomycin (VANCOCIN) 1000 mg in  ml infusion  1,000 mg IntraVENous Q36H    sodium chloride (NS) flush 10-30 mL  10-30 mL InterCATHeter PRN    sodium chloride (NS) flush 10 mL  10 mL InterCATHeter Q24H    sodium chloride (NS) flush 10 mL  10 mL InterCATHeter PRN    sodium chloride (NS) flush 10-40 mL  10-40 mL InterCATHeter Q8H    sodium chloride (NS) flush 20 mL  20 mL InterCATHeter Q24H    alteplase (CATHFLO) 1 mg in sterile water (preservative free) 1 mL injection  1 mg InterCATHeter PRN    famotidine (PF) (PEPCID) injection 20 mg  20 mg IntraVENous DAILY    heparin (porcine) injection 5,000 Units  5,000 Units SubCUTAneous Q12H    potassium chloride 10 mEq in 50 ml IVPB  10 mEq IntraVENous Q1H    sodium chloride (NS) flush 5-10 mL  5-10 mL IntraVENous PRN    0.9% sodium chloride infusion  125 mL/hr IntraVENous CONTINUOUS    0.9% sodium chloride infusion  125 mL/hr IntraVENous CONTINUOUS    piperacillin-tazobactam (ZOSYN) 6.75 g in 0.9% sodium chloride 500 mL continuous 24 hr infusion  6.75 g IntraVENous Q24H    Vancomycin Pharmacy to Dose   Other Rx Dosing/Monitoring     ______________________________________________________________________  EXPECTED LENGTH OF STAY: 4d 21h  ACTUAL LENGTH OF STAY:          1                 Latonya Chino MD

## 2018-02-01 NOTE — ED NOTES
Verbal report was given to Perri Harding RN who will assume care of patient at this time. Receiving nurse had an opportunity to ask questions and seek clarifications. Receiving nurse aware of pending lab results and orders that need to be completed.

## 2018-02-01 NOTE — ROUTINE PROCESS
TRANSFER - OUT REPORT:    Verbal report given to Yony Ceballos RN(name) on Ramiro Braun  being transferred to 7ICU(unit) for routine progression of care       Report consisted of patients Situation, Background, Assessment and   Recommendations(SBAR). Information from the following report(s) SBAR, ED Summary, Intake/Output, MAR, Accordion, Recent Results and Cardiac Rhythm NSR was reviewed with the receiving nurse. Lines:       Opportunity for questions and clarification was provided.       Patient transported with:   Monitor  O2 @ 2 liters  Registered Nurse

## 2018-02-01 NOTE — H&P
[address]  ACUTE CARE HISTORY AND PHYSICAL    Bud BAEZA  MR#: [id number]  : 1935  ACCOUNT #: [de-identified]   DATE OF SERVICE: 2018    CHIEF COMPLAINT:  Body aches. HISTORY OF PRESENT ILLNESS:  An 60-year-old white male with past medical history of arthritis, arrhythmia, constipation, hearing loss, hypertension, hyperlipidemia, joint pain, anemia, memory loss, who presented to the emergency department via EMS from assisted living facility with a chief complaint of generalized body aches. The patient reportedly has a history of dementia according to the ER records. Staff in the nursing home reported the patient was feeling weaker, more than usual.  Baseline, he uses a wheelchair. He reportedly had had increased thirst with decreased appetite with generalized body aches, which remained constant and severe without specific known aggravating factors. The patient admits to some chills. There were no reports of fever. He reportedly had a nonproductive cough. The patient denies any headache, chest pain. No reports of shortness of breath, palpitations, abdominal pain, nausea, vomiting, diarrhea, melena, dysuria, hematuria, calf pain, swelling, edema or rash. On arrival in the emergency department, initial recorded vital signs were blood pressure of 101/53, heart rate 80, respiratory rate 18, O2 saturation 97% on room air. Unfortunately, the patient's blood pressure decreased as low at 86/39. Systolic blood pressure was remaining in the 80s. Labs showed an elevated BUN of 83, a creatinine of 2.99, an increase compared to last creatinine of 2.22 on 2017. Elevated LFTs, elevated proBNP of 11,402. However, his chest x-ray portable showed no acute process. Additional labs showed elevated WBC of 34,100, neutrophils 88%.   Per the ED, the patient was started on 0.9% normal saline 1000 mL fluid bolus x2, Zosyn 3.375 grams IV, vancomycin 1750 mg IV for antibiotic coverage. Urinalysis showed WBC greater than 100, but bacteria was negative. The patient is now seen for admission to the hospice service for continued evaluation and treatment. PAST MEDICAL HISTORY  1. Arrhythmia. 2.  Arthritis. 3.  Constipation. 4.  Hair loss. 5.  Heart disease. 6.  Hyperlipidemia. 7.  Joint pain. 8.  Memory loss. 9.  Anemia. PAST SURGICAL HISTORY  1.  Back surgery, unspecified in 1974.  2.  Left cataract extraction and intraocular lens implant. 3.  Colonoscopy. 4.  Hernia repair. 5.  Tonsillectomy. 6.  Upper GI endoscopy on 03/22/2013. MEDICATIONS:  Tylenol 650 mg p.o. q.8 hours p.r.n., Colace 100 mg p.o. daily, Flonase 50 mcg actuation nasal spray 2 sprays to both nostrils daily, Lasix 40 mg p.o. b.i.d., menthol/zinc oxide 0.44/20.6% ointment applied to affected areas of skin irritation, metoprolol 12.5 mg p.o. b.i.d., Nystatin topical cream apply to affected areas t.i.d., Protonix 40 mg p.o. b.i.d., Crestor 20 mg p.o. at bedtime, Senexon-S 8.6/50 mg 1 tablet p.o. b.i.d. ALLERGIES:  CIPROFLOXACIN, NSAIDs. SOCIAL HISTORY:  Former smoker of cigarettes, fully quit in 1971. Denies alcohol. No reports of illicit drugs. He is . FAMILY HISTORY:  Stroke in mother. Heart disease in father. Lung cancer in brother. Nonalcoholic liver cirrhosis in brother. REVIEW OF SYSTEMS:  All systems reviewed. Pertinent positives were as HPI, otherwise negative. PHYSICAL EXAMINATION  VITAL SIGNS:  Temperature 97.7 degrees Fahrenheit, blood pressure 89/44, heart rate is 69, respiratory rate is 16, O2 saturation 100% on room air. Recorded weight of 164 pounds (74.4 kg), recorded height of 5 feet 7 inches tall. GENERAL:  Ill appearing male in no acute respiratory distress. PSYCH:  Patient alert, awake, alert, oriented x3. Anxious. NEUROLOGIC:  GCS of 15, best response. Moves extremities x4.   Sensation grossly intact without slurred speech, facial droop, generalized weakness. HEENT:  Normocephalic, atraumatic. Pupil irregularity on the left (postsurgical changes). Right pupil 2 mm reactive. Sclerae are anicteric. Conjunctivae clear. Nares are patent. Oropharynx clear. Tongue is midline, not edematous. Oral mucosa is dry. NECK:  Supple, without lymphadenopathy, JVD, carotid bruits, thyromegaly. LYMPH:  Negative for cervical or supraclavicular adenopathy. LUNGS:  Clear to auscultation bilaterally. CVS:  Heart is regular rate and rhythm, normal S1, S2, without murmurs, rubs or gallops. GASTROINTESTINAL:  Abdomen is soft, nontender, nondistended. Normoactive bowel sounds. No rebound, guarding, rigidity. No auscultated abdominal bruits. No pulsatile mass. BACK:  No CVA tenderness. No step-off or deformity. MUSCULOSKELETAL:  No acute palpable bony deformity. Negative for calf tenderness. VASCULAR:  2+ radial, 1+ dorsalis pedis pulses without cyanosis or clubbing. There is nonpitting edema in right lower extremity. SKIN:  Warm and dry. LABORATORY DATA:  Reviewed as follows:  Sodium 138, potassium 3.9, chloride 98, CO2 of 27, BUN of 83, creatinine of 2.99, glucose 126, anion gap 13, calcium 7.8, GFR 20. Total bilirubin is 1.2, total protein 6.9, albumin is 2.5, ALT of 155, AST of 141, alkaline phosphatase 704, lactic acid 1.0, troponin I of less than 0.04. ProBNP of 11,402. WBC 34.1, hemoglobin 9.8, hematocrit 29.6, platelets of 231, neutrophils 88%. Urinalysis, leukocyte esterase large, nitrites negative, urobilinogen 1.0, bilirubin negative, blood moderate, ketones negative, glucose negative, protein 30, pH of 5.5, specific gravity 1.015. WBC greater than 100, RBCs 0-5, bacteria negative. INR 1.1. PT of 11.6 and PTT of 38.6.      STUDIES:  CT abdomen and pelvis without contrast with significant bladder wall thickening, which may be related to chronic bladder obstruction with neoplastic etiology not excluded, cholelithiasis severe, degenerative change of the femoral acetabular joint and in the lumbar spine with minimal nonspecific retroperitoneal adenopathy, indeterminate etiology. Chest x-ray portable with no acute findings. A 12-lead EKG is normal sinus rhythm, left anterior fascicular block, nonspecific T-wave changes at 79 beats per minute. IMPRESSION AND PLAN  1. Shock/hypotension. Concern for sepsis. Admit to ICU. I have requested emergent insertion of central venous line/catheter, as the patient will need to be started on vasopressors. He will continue with IV fluids despite the elevated proBNP. Chest x-ray shows no evidence of pulmonary edema and the patient needs preload volume support. As he has been refractory to the prior IV fluids administered, we will order Levophed titrated IV infusion once central venous line is inserted. Place on q.1 hour vital sign checks, monitor closely. Clinical condition is guarded to critical at this time. I reviewed [facility] and chart records, and I do not see any DNR on chart. As such, the patient is FULL CODE. Continue with supportive cares. 2.  Systemic inflammatory response syndrome (SIRS). Likely sepsis. Etiology is uncertain. 3.  Cystitis with bladder wall thickening. Cannot rule out neoplasm. We will consult with urologist with regards to the same. In the interim, we will continue the patient on IV antibiotics. The urinalysis was not definitely for UTI with no evidence of bacteria per microscopy. Patient has already been started on IV antibiotics with Zosyn and will continue on the same. 4.  Dehydration, acute kidney injury. Continue with IV fluid hydration resuscitation. Repeat renal panel in the a.m. Place on strict I's and O's.  5.  Elevated LFTs. Repeat comprehensive metabolic panel. 6.  Elevated proBNP. In the context of no evidence of pulmonary edema, current dehydration, and need for volume support, we will have to proceed with volume support.   Just continue to monitor the patient's fluid status, respiratory status. May order a 2D echocardiogram in a.m.  7.  Generalized weakness/ability. Place on fall precautions. 8.  History of memory loss. However, currently the patient is oriented x3. Continue with neurovascular checks. 9.  Leukocytosis. Repeat CBC. 10.  Anemia. Repeat hemoglobin and hematocrit. 11.  Venous thromboembolism prophylaxis. SCDs to lower extremities. CODE STATUS:  FULL CODE as mentioned. No other advanced directive on chart to the contrary. MD JAYY Cox / Anthony Joseph  D: 02/01/2018 00:40     T: 02/01/2018 06:32  JOB #: [id number]

## 2018-02-01 NOTE — ED PROVIDER NOTES
HPI Comments: 81 y.o. male with extensive past medical history, please see list, significant for Arthritis, HTN, Heart dz, joint pain, hypercholesteremia, arrhtmymia, memory loss, who presents from via EMS from assisted living facility with chief complaint of generalized body aches. Patient's history was limited due to previous history of Dementia. Per Nursing staff, Pt was feeling weaker than normal today. Pt is wheelchair bound at baseline. Nursing staff also reported that appeared very thirsty, and had a decreased appetite. Pt was then transported to the ED. Pt presents to ED complaining of slight generalized body aches. Accompanying sx include a non-productive cough. Pt denies any fever, chills, (temperature measured to be 97.7 F) and abd pain. There are no other acute medical concerns at this time. PCP: Gus Traore MD    Note written by Jovon Marie, as dictated by Elbert Castleman, MD 7:11 PM      The history is provided by the patient and the nursing home. History limited by: Dementia. Past Medical History:   Diagnosis Date    Arrhythmia     RAPID HEARTBEAT    Arthritis     Constipation     Hearing loss     Heart disease     Hypercholesteremia     Hypertension     Joint pain     Memory disorder        Past Surgical History:   Procedure Laterality Date    HX BACK SURGERY  1974    HX CATARACT REMOVAL      LEFT    HX COLONOSCOPY      HX HERNIA REPAIR  1975    double    HX TONSILLECTOMY      UPPER GI ENDOSCOPY,BIOPSY  3/22/2013              Family History:   Problem Relation Age of Onset    Stroke Mother     Heart Disease Father     Cancer Brother      lung    Other Brother      NON ALCOHOLIC CIRRHOSIS       Social History     Social History    Marital status:      Spouse name: N/A    Number of children: N/A    Years of education: N/A     Occupational History    Not on file.      Social History Main Topics    Smoking status: Former Smoker     Years: 16.00     Quit date: 1/23/1971    Smokeless tobacco: Not on file    Alcohol use No    Drug use: No    Sexual activity: Not on file     Other Topics Concern    Not on file     Social History Narrative         ALLERGIES: Ciprofloxacin and Nsaids (non-steroidal anti-inflammatory drug)    Review of Systems   Constitutional: Negative for chills and fever. HENT: Negative for congestion. Respiratory: Positive for cough. Negative for shortness of breath. Cardiovascular: Negative for chest pain. Gastrointestinal: Negative for abdominal pain, diarrhea, nausea and vomiting. Genitourinary: Negative for difficulty urinating and dysuria. Musculoskeletal: Positive for arthralgias. Skin: Negative for wound. All other systems reviewed and are negative. Patient Vitals for the past 12 hrs:   Temp Pulse Resp BP SpO2   01/31/18 2113 97.9 °F (36.6 °C) (!) 109 18 137/81 -   01/31/18 1901 97.7 °F (36.5 °C) 80 18 100/49 98 %   01/31/18 1901 - - - - 97 %   01/31/18 1900 - - - 99/46 -   01/31/18 1859 - - - 101/53 -         Physical Exam   Constitutional: He appears well-developed and well-nourished. HENT:   Head: Normocephalic and atraumatic. Mouth/Throat: Oropharynx is clear and moist. No oropharyngeal exudate. Eyes: Conjunctivae are normal. No scleral icterus. Neck: Neck supple. No thyromegaly present. Cardiovascular: Normal rate, regular rhythm and normal heart sounds. Exam reveals no gallop and no friction rub. No murmur heard. Pulmonary/Chest: Effort normal and breath sounds normal. No stridor. No respiratory distress. He has no wheezes. He has no rales. Abdominal: Soft. Bowel sounds are normal. There is no tenderness. There is no rebound and no guarding. Musculoskeletal: Normal range of motion. He exhibits edema (+1 in both lower extremities). Lymphadenopathy:     He has no cervical adenopathy. Neurological: He is alert. No new focal neurological deficits   Skin: Skin is warm and dry. Psychiatric: He has a normal mood and affect. Nursing note and vitals reviewed. Note written by Isabel Hills, as dictated by Marty Jackman MD 7:11 PM    MDM  Number of Diagnoses or Management Options     Amount and/or Complexity of Data Reviewed  Clinical lab tests: ordered and reviewed  Tests in the radiology section of CPT®: ordered and reviewed  Tests in the medicine section of CPT®: ordered and reviewed  Discussion of test results with the performing providers: yes  Obtain history from someone other than the patient: yes  Discuss the patient with other providers: yes          ED Course       Central Line  Date/Time: 2/1/2018 11:30 PM  Performed by: Hunter Watkins  Authorized by: Osmel PRATT     Consent:     Consent obtained:  Verbal and emergent situation    Consent given by:  Patient    Risks discussed:  Arterial puncture, incorrect placement, infection, pneumothorax and bleeding    Alternatives discussed:  No treatment  Pre-procedure details:     Hand hygiene: Hand hygiene performed prior to insertion      Sterile barrier technique: All elements of maximal sterile technique followed      Skin preparation:  Betadine    Skin preparation agent: Skin preparation agent completely dried prior to procedure    Anesthesia (see MAR for exact dosages): Anesthesia method:  Local infiltration  Procedure details:     Location:  R internal jugular    Patient position:  Trendelenburg    Procedural supplies:  Triple lumen    Landmarks identified: yes      Ultrasound guidance: yes      Number of attempts:  2    Successful placement: yes    Post-procedure details:     Post-procedure:  Dressing applied and line sutured    Assessment:  Blood return through all ports, free fluid flow, no pneumothorax on x-ray and placement verified by x-ray    Patient tolerance of procedure:   Tolerated well, no immediate complications        ED EKG interpretation:  Rhythm: normal sinus rhythm; Rate (approx.): 79 bpm; Left anterior fascicular block, Nonspecific T wave abnormality. Note written by Isabel Flores, as dictated by Claudia Bridges MD 8:46 PM       CONSULT NOTE:  10:14 PM Claudia Bridges MD spoke with Dr. Jeison Miller, Consult for Hospitalist.  Discussed available diagnostic tests and clinical findings. He is in agreement with care plans as outlined. Will see and admit    Total critical care time spent exclusive of procedures: 34 minutes    Assessment and plan     the patient has urosepsis received 2 L of fluid but has a very elevated BNP antibiotics were administered Dr. Saad Han request a central line was placed without difficulty by myself the chest x-ray did not show any pneumothorax or other complication. Blood pressure now 95 systolic.

## 2018-02-01 NOTE — ED NOTES
Assumed care of patient at this time. Patient feels very warm to the touch and his BP is drifting down. He needs a urine sample sent for UA and UC. Will straight cath him for this. At that time, rectal temperature will also be obtained as he is reading afebrile orally and axillary, but his skin is very hot to touch. Patient currently on CM x 2, will increase to CM x 3. Call bell within reach of patient at this time.

## 2018-02-01 NOTE — ED NOTES
Patient reassessed again at this time. Patient's BP is now critically low in the 80s over 30s. Team is aware and the decision has been made to place a central line for the use of pressors. Dr. Eric Hanna is getting ready for placement of the line, Dr. Malia Hardin has seen patient, agrees with the line placement and is getting ready to order Norepinephrine as needed to maintain SBP > 100. Patient continues to be alert and answering questions at this time. CM x 3. Call bell within reach of patient.

## 2018-02-02 LAB
ALBUMIN SERPL-MCNC: 1.6 G/DL (ref 3.5–5)
ALBUMIN/GLOB SERPL: 0.5 {RATIO} (ref 1.1–2.2)
ALP SERPL-CCNC: 384 U/L (ref 45–117)
ALT SERPL-CCNC: 73 U/L (ref 12–78)
ANION GAP SERPL CALC-SCNC: 9 MMOL/L (ref 5–15)
AST SERPL-CCNC: 56 U/L (ref 15–37)
BACTERIA SPEC CULT: ABNORMAL
BASOPHILS # BLD: 0.1 K/UL (ref 0–0.1)
BASOPHILS NFR BLD: 1 % (ref 0–1)
BILIRUB SERPL-MCNC: 0.8 MG/DL (ref 0.2–1)
BUN SERPL-MCNC: 56 MG/DL (ref 6–20)
BUN/CREAT SERPL: 28 (ref 12–20)
CALCIUM SERPL-MCNC: 7.1 MG/DL (ref 8.5–10.1)
CC UR VC: ABNORMAL
CHLORIDE SERPL-SCNC: 112 MMOL/L (ref 97–108)
CO2 SERPL-SCNC: 23 MMOL/L (ref 21–32)
CREAT SERPL-MCNC: 1.98 MG/DL (ref 0.7–1.3)
DIFFERENTIAL METHOD BLD: ABNORMAL
EOSINOPHIL # BLD: 0.3 K/UL (ref 0–0.4)
EOSINOPHIL NFR BLD: 2 % (ref 0–7)
ERYTHROCYTE [DISTWIDTH] IN BLOOD BY AUTOMATED COUNT: 21.6 % (ref 11.5–14.5)
GLOBULIN SER CALC-MCNC: 3.5 G/DL (ref 2–4)
GLUCOSE SERPL-MCNC: 80 MG/DL (ref 65–100)
HCT VFR BLD AUTO: 23.1 % (ref 36.6–50.3)
HGB BLD-MCNC: 7.5 G/DL (ref 12.1–17)
IMM GRANULOCYTES # BLD: 0 K/UL
IMM GRANULOCYTES NFR BLD AUTO: 0 %
LYMPHOCYTES # BLD: 0.3 K/UL (ref 0.8–3.5)
LYMPHOCYTES NFR BLD: 2 % (ref 12–49)
MAGNESIUM SERPL-MCNC: 1.7 MG/DL (ref 1.6–2.4)
MCH RBC QN AUTO: 29.5 PG (ref 26–34)
MCHC RBC AUTO-ENTMCNC: 32.5 G/DL (ref 30–36.5)
MCV RBC AUTO: 90.9 FL (ref 80–99)
MONOCYTES # BLD: 0.9 K/UL (ref 0–1)
MONOCYTES NFR BLD: 7 % (ref 5–13)
NEUTS BAND NFR BLD MANUAL: 5 % (ref 0–6)
NEUTS SEG # BLD: 11.5 K/UL (ref 1.8–8)
NEUTS SEG NFR BLD: 83 % (ref 32–75)
NRBC # BLD: 0 K/UL (ref 0–0.01)
NRBC BLD-RTO: 0 PER 100 WBC
PHOSPHATE SERPL-MCNC: 3.6 MG/DL (ref 2.6–4.7)
PLATELET # BLD AUTO: 219 K/UL (ref 150–400)
PMV BLD AUTO: 11.2 FL (ref 8.9–12.9)
POTASSIUM SERPL-SCNC: 3 MMOL/L (ref 3.5–5.1)
PROT SERPL-MCNC: 5.1 G/DL (ref 6.4–8.2)
RBC # BLD AUTO: 2.54 M/UL (ref 4.1–5.7)
RBC MORPH BLD: ABNORMAL
SERVICE CMNT-IMP: ABNORMAL
SODIUM SERPL-SCNC: 144 MMOL/L (ref 136–145)
WBC # BLD AUTO: 13.1 K/UL (ref 4.1–11.1)
WBC MORPH BLD: ABNORMAL

## 2018-02-02 PROCEDURE — 74011250636 HC RX REV CODE- 250/636: Performed by: INTERNAL MEDICINE

## 2018-02-02 PROCEDURE — 36415 COLL VENOUS BLD VENIPUNCTURE: CPT | Performed by: INTERNAL MEDICINE

## 2018-02-02 PROCEDURE — 84100 ASSAY OF PHOSPHORUS: CPT | Performed by: INTERNAL MEDICINE

## 2018-02-02 PROCEDURE — 74011250636 HC RX REV CODE- 250/636: Performed by: HOSPITALIST

## 2018-02-02 PROCEDURE — 77010033678 HC OXYGEN DAILY

## 2018-02-02 PROCEDURE — 74011250636 HC RX REV CODE- 250/636: Performed by: FAMILY MEDICINE

## 2018-02-02 PROCEDURE — 80053 COMPREHEN METABOLIC PANEL: CPT | Performed by: INTERNAL MEDICINE

## 2018-02-02 PROCEDURE — 65270000029 HC RM PRIVATE

## 2018-02-02 PROCEDURE — 83735 ASSAY OF MAGNESIUM: CPT | Performed by: INTERNAL MEDICINE

## 2018-02-02 PROCEDURE — 74011250637 HC RX REV CODE- 250/637: Performed by: HOSPITALIST

## 2018-02-02 PROCEDURE — 74011000250 HC RX REV CODE- 250: Performed by: INTERNAL MEDICINE

## 2018-02-02 PROCEDURE — 85025 COMPLETE CBC W/AUTO DIFF WBC: CPT | Performed by: INTERNAL MEDICINE

## 2018-02-02 PROCEDURE — 77030010547 HC BG URIN W/UMETER -A

## 2018-02-02 RX ORDER — MAGNESIUM SULFATE HEPTAHYDRATE 40 MG/ML
2 INJECTION, SOLUTION INTRAVENOUS ONCE
Status: COMPLETED | OUTPATIENT
Start: 2018-02-02 | End: 2018-02-03

## 2018-02-02 RX ORDER — POTASSIUM CHLORIDE 14.9 MG/ML
10 INJECTION INTRAVENOUS
Status: COMPLETED | OUTPATIENT
Start: 2018-02-02 | End: 2018-02-03

## 2018-02-02 RX ADMIN — CASTOR OIL AND BALSAM, PERU: 788; 87 OINTMENT TOPICAL at 12:09

## 2018-02-02 RX ADMIN — Medication 10 ML: at 09:08

## 2018-02-02 RX ADMIN — CASTOR OIL AND BALSAM, PERU: 788; 87 OINTMENT TOPICAL at 19:28

## 2018-02-02 RX ADMIN — VANCOMYCIN HYDROCHLORIDE 1000 MG: 10 INJECTION, POWDER, LYOPHILIZED, FOR SOLUTION INTRAVENOUS at 09:08

## 2018-02-02 RX ADMIN — HEPARIN SODIUM 5000 UNITS: 5000 INJECTION, SOLUTION INTRAVENOUS; SUBCUTANEOUS at 09:08

## 2018-02-02 RX ADMIN — POTASSIUM CHLORIDE 10 MEQ: 200 INJECTION, SOLUTION INTRAVENOUS at 12:06

## 2018-02-02 RX ADMIN — Medication 20 ML: at 09:09

## 2018-02-02 RX ADMIN — FAMOTIDINE 20 MG: 10 INJECTION, SOLUTION INTRAVENOUS at 09:08

## 2018-02-02 RX ADMIN — Medication 10 ML: at 16:08

## 2018-02-02 RX ADMIN — POTASSIUM CHLORIDE 10 MEQ: 200 INJECTION, SOLUTION INTRAVENOUS at 13:17

## 2018-02-02 RX ADMIN — HEPARIN SODIUM 5000 UNITS: 5000 INJECTION, SOLUTION INTRAVENOUS; SUBCUTANEOUS at 22:00

## 2018-02-02 RX ADMIN — Medication 10 ML: at 22:00

## 2018-02-02 RX ADMIN — PIPERACILLIN AND TAZOBACTAM 10.12 G: 3; .375 INJECTION, POWDER, FOR SOLUTION INTRAVENOUS at 13:12

## 2018-02-02 RX ADMIN — PIPERACILLIN SODIUM,TAZOBACTAM SODIUM 6.75 G: 3; .375 INJECTION, POWDER, FOR SOLUTION INTRAVENOUS at 00:00

## 2018-02-02 RX ADMIN — POTASSIUM CHLORIDE 10 MEQ: 200 INJECTION, SOLUTION INTRAVENOUS at 13:00

## 2018-02-02 RX ADMIN — MAGNESIUM SULFATE HEPTAHYDRATE 2 G: 40 INJECTION, SOLUTION INTRAVENOUS at 13:14

## 2018-02-02 NOTE — ROUTINE PROCESS
TRANSFER - IN REPORT:    Verbal report received from Mary Alice(name) on Doroteo Race  being received from ICU(unit) for routine progression of care      Report consisted of patients Situation, Background, Assessment and   Recommendations(SBAR). Information from the following report(s) SBAR, Intake/Output, MAR and Recent Results was reviewed with the receiving nurse. Opportunity for questions and clarification was provided. Assessment completed upon patients arrival to unit and care assumed.

## 2018-02-02 NOTE — PROGRESS NOTES
Problem: Falls - Risk of  Goal: *Absence of Falls  Document Rachel Fall Risk and appropriate interventions in the flowsheet.    Outcome: Progressing Towards Goal  Fall Risk Interventions:       Mentation Interventions: Adequate sleep, hydration, pain control, Door open when patient unattended, More frequent rounding, Reorient patient, Toileting rounds, Update white board    Medication Interventions: Evaluate medications/consider consulting pharmacy, Patient to call before getting OOB    Elimination Interventions: Call light in reach, Patient to call for help with toileting needs, Toileting schedule/hourly rounds

## 2018-02-02 NOTE — WOUND CARE
WOCN Note:     New consult placed by RN for assessment of sacrum, heel and left hip. Chart reviewed. Admitted DX:  SIRS (systemic inflammatory response syndrome) (HCC)  Shock ([address]  Hypotension  Leukocytosis  Abnormal urinalysis  Dehydration    Past Medical History:  arthritis, arrhythmia, constipation, hearing loss, hypertension, hyperlipidemia, joint pain, anemia, memory loss,  Admitted from assisted living. Assessment:   Patient is A&O x 3, communicative,  and requires assist of 2 with repositioning. Bed: Sport  Patient wearing briefs for incontinence and has a condom catheter. Diet: 2 gram NA  Patient reports no pain. All areas are present on admission. Bilateral heel, buttocks, and sacral skin intact and without erythema. Dry scab to Right achilles. Scattered ecchymotic areas to upper extremities. 1.  Left trochanter, Deep tissue pressure injury Present on Admission:  3 x 1 x 0 cm; 80% non blanching red 20% non blanching purple. 2.  Right buttock, superficial denuded area: 1 x 0.2 x 0.1 cm; 100% red. 3.  Left buttock, superficial denuded area; 0.5 x 0.2 x 0.1 cm; 100% red. Patient repositioned on right side with pillows. Heels offloaded on prevalon boots. Recommendations:    Continue Sport bed  Continue Prevalon boots    Left trochanter:  Twice daily apply Venelex  Heels, buttocks and sacrum: Twice daily apply [employer]. Skin Care & Pressure Prevention:  Minimize layers of linen/pads under patient to optimize support surface. Turn/reposition approximately every 2 hours and offload heels. Manage incontinence / promote continence;   Specialty bed: sport . Use only flat sheet and one incontinence pad. Discussed above plan with Sussy Hanks RN.     Transition of Care: Plan to follow weekly and as needed while admitted to hospital.    LUIS E Soriano RN  Office [phone]  Pager [phone]

## 2018-02-02 NOTE — DISCHARGE INSTRUCTIONS
Discharging provider: Jem Layne MD    Primary care provider: Aime Buchanan MD    FINAL [address] [facility] COURSE:    An 36-year-old white male with past medical history of arthritis, arrhythmia, constipation, hearing loss, hypertension, hyperlipidemia, joint pain, anemia, memory loss, who presented to the emergency department via EMS from assisted living facility with a chief complaint of generalized body aches.      Sepsis with septic shock with E.coli and clostridial bacteremia (POA) - GI source?  - CT abdomen/pelvis 1/31 with bladder wall thickening, cholelithiasis, RP adenopathy, degenerative arthritis  - Blood cx 1/31 with clostridium perfringens, e.coli, gram positive rods, repeat 2/1, 2/7 no growth  - SBFT 2/7 without mass but poor study  - Colonoscopy as outpatient with Dr. Chloé Gardner  - Continue zosyn started 2/1 for 2 week course with last dose on 2/14/18  - off levophed  - Received IVF  - ID consulted - follow up with Dr. Daquan Vazquez     Pseudomonas Aeruginosa UTI (POA)  - Urine cx 1/31 with 50K CFU pseudomonas  - Continue zosyn started on 2/1     Bladder wall thickening on CT (POA) - infection vs neoplasm vs due to bladder outlet obstruction, no hematuria   - Urologist consulted - follow up with Dr. Jeanette Tran for outpatient cystoscopy      Dehydration (POA) - improved with IVF  - Monitor I/O     Elevated transaminase possible due to sepsis - resolved     CKD stage 4 - creatinine improving, on IVF  - Continue to monitor renal function and urine output      Anemia of chronic disease - H/H is trending down, no evidence of active bleeding  - monitor H/H with CBC Monday 2/12/18     Hypokalemia - monitor and replace as needed     Left trochanter, Deep tissue pressure injury (POA)  - Wound care consulted - continue wound care     Debility - he said he usually stay in recliner and uses wheelchair with help  - PT/OT    FOLLOW-UP CARE RECOMMENDATIONS:    APPOINTMENTS:  Follow-up Information     Follow up With Details Comments Contact Info    Perez Nam MD Call As needed for primary care [address]  [address]      Jah Pineda MD Schedule an appointment as soon as possible for a visit in 2 weeks For follow up of bladder thickening [address] [facility] [address] [facility] [address]      Leatah Marin MD Schedule an appointment as soon as possible for a visit in 2 weeks For follow up colonoscopy [address]       Tello MD Vidya Schedule an appointment as soon as possible for a visit in 1 week For follow up of blood stream infection [address] [facility]  [phone]           It is very important that you keep follow-up appointment(s). Bring discharge papers, medication list (and/or medication bottles) to follow-up appointments for review by outpatient provider(s). FOLLOW-UP TESTS RECOMMENDED:   · CBC and CMP on Monday     ONGOING TREATMENT PLAN: Finish antibiotics, remove PICC, follow up with GI for colonoscopy to rule out abdominal structural abnormality as a cause of bacteremia, follow up with ID, follow up with Urology for bladder wall thickening. Specific symptoms to watch for: chest pain, shortness of breath, fever, chills, nausea, vomiting, diarrhea, change in mentation, falling, weakness, bleeding. DIET:  Cardiac Diet    ACTIVITY:  Activity as tolerated    WOUND CARE:   · Continue Sport bed (or equivalent)  · Continue Prevalon boots  · Left trochanter:  Twice daily apply Venelex  · Heels, buttocks and sacrum: Twice daily apply Aloe Vesta.     GOALS OF CARE:  x  Eventual return to home/independent/assisted living     Long term SNF      Hospice     No rehospitalization     Patient condition at discharge:   Functional status  x  Poor      Deconditioned      Independent   Cognition    Lucid   x  Forgetful (some sensescence)     Dementia   Catheters/lines (plus indication)    Busby   x  PICC      PEG         Code status  x  Full code      DNR    . . . . . . . . . . . . . . . . . . . . . . . . . . . . . . . . . . . . . . . . . . . . . . . . . . . . . . . . . . . . . . . . . . . . . . . Corrinne Dresser      CHRONIC MEDICAL CONDITIONS:  Problem List as of 2/8/2018  Date Reviewed: 2/1/2018          Codes Class Noted - Resolved    Dehydration ICD-10-CM: E86.0  ICD-9-CM: 276.51  1/31/2018 - Present        Leukocytosis ICD-10-CM: D72.829  ICD-9-CM: 288.60  1/31/2018 - Present        * (Principal)Shock ([facility].) ICD-10-CM: R57.9  ICD-9-CM: 785.50  1/31/2018 - Present        Hypotension ICD-10-CM: I95.9  ICD-9-CM: 458.9  1/31/2018 - Present        Abnormal urinalysis ICD-10-CM: R82.90  ICD-9-CM: 791.9  1/31/2018 - Present        SIRS (systemic inflammatory response syndrome) ([facility].) ICD-10-CM: R65.10  ICD-9-CM: 995.90  1/31/2018 - Present        Venous insufficiency of both lower extremities ICD-10-CM: I87.2  ICD-9-CM: 459.81  7/31/2017 - Present        Hyperlipidemia ICD-10-CM: E78.5  ICD-9-CM: 272.4  7/31/2017 - Present        Urinary retention ICD-10-CM: R33.9  ICD-9-CM: 788.20  7/31/2017 - Present        Busby catheter in place ICD-10-CM: Z92.89  ICD-9-CM: V45.89  7/31/2017 - Present        JAMES (acute kidney injury) ([facility].) ICD-10-CM: N17.9  ICD-9-CM: 584.9  7/22/2017 - Present        Status post hip surgery ICD-10-CM: Z98.890  ICD-9-CM: V45.89  5/31/2017 - Present        Chronic pain of right knee ICD-10-CM: M25.561, G89.29  ICD-9-CM: 719.46, 338.29  5/31/2017 - Present        CKD (chronic kidney disease) stage 3, GFR 30-59 ml/min ICD-10-CM: N18.3  ICD-9-CM: 585.3  4/30/2017 - Present        Chronic anemia ICD-10-CM: D64.9  ICD-9-CM: 285.9  4/30/2017 - Present        Debility ICD-10-CM: R53.81  ICD-9-CM: 799.3  4/30/2017 - Present        UTI (urinary tract infection) ICD-10-CM: N39.0  ICD-9-CM: 599.0  11/7/2016 - Present        Renal failure ICD-10-CM: N19  ICD-9-CM: 523  1/23/2015 - Present Falls ICD-10-CM: W19. Chace Gustafson  ICD-9-CM: E888.9  1/23/2015 - Present        Rhabdomyolysis ICD-10-CM: M62.82  ICD-9-CM: 728.88  1/23/2015 - Present        Weight loss ICD-10-CM: R63.4  ICD-9-CM: 783.21  1/23/2015 - Present        Night sweats ICD-10-CM: R61  ICD-9-CM: 780.8  1/23/2015 - Present        Anorexia ICD-10-CM: R63.0  ICD-9-CM: 783.0  1/23/2015 - Present        Anemia ICD-10-CM: D64.9  ICD-9-CM: 285.9  1/23/2015 - Present        Weakness ICD-10-CM: R53.1  ICD-9-CM: 780.79  3/20/2013 - Present        Sinus tachycardia ICD-10-CM: R00.0  ICD-9-CM: 427.89  3/20/2013 - Present        ARF (acute renal failure) (HCC) ICD-10-CM: N17.9  ICD-9-CM: 584.9  3/20/2013 - Present        Hypercholesteremia ICD-10-CM: E78.00  ICD-9-CM: 272.0  Unknown - Present        Hypertension ICD-10-CM: I10  ICD-9-CM: 401.9  Unknown - Present        Arthritis ICD-10-CM: M19.90  ICD-9-CM: 716.90  Unknown - Present        Peripheral neuropathy ICD-10-CM: G62.9  ICD-9-CM: 356.9  3/20/2013 - Present        Tremor ICD-10-CM: R25.1  ICD-9-CM: 781.0  11/26/2012 - Present        RESOLVED: Urinary tract infection ICD-10-CM: N39.0  ICD-9-CM: 599.0  4/1/2017 - 4/7/2017        RESOLVED: Acute kidney injury ([address] ICD-10-CM: N17.9  ICD-9-CM: 584.9  4/1/2017 - 4/7/2017        RESOLVED: Vomiting ICD-10-CM: R11.10  ICD-9-CM: 787.03  1/23/2015 - 11/10/2016        RESOLVED: Osteoarthritis of right knee (Chronic) ICD-10-CM: M17.11  ICD-9-CM: 715.96  2/11/2014 - 1/23/2015        RESOLVED: Nausea & vomiting ICD-10-CM: R11.2  ICD-9-CM: 787.01  3/20/2013 - 1/23/2015        RESOLVED: Orthostatic hypotension ICD-10-CM: I95.1  ICD-9-CM: 458.0  3/20/2013 - 1/23/2015        RESOLVED: Abdominal pain, RLQ ICD-10-CM: R10.31  ICD-9-CM: 789.03  3/20/2013 - 1/23/2015        RESOLVED: Hyperkalemia ICD-10-CM: E87.5  ICD-9-CM: 276.7  3/20/2013 - 1/23/2015        RESOLVED: Dehydration ICD-10-CM: E86.0  ICD-9-CM: 276.51  3/20/2013 - 1/23/2015        RESOLVED: Weight loss ICD-10-CM: R63.4  ICD-9-CM: 783.21  3/20/2013 - 1/23/2015        RESOLVED: Memory disorder ICD-10-CM: R41.3  ICD-9-CM: 780.93  Unknown - 1/23/2015        RESOLVED: Joint pain ICD-10-CM: M25.50  ICD-9-CM: 719.40  Unknown - 1/23/2015        RESOLVED: Insomnia ICD-10-CM: G47.00  ICD-9-CM: 780.52  3/20/2013 - 1/23/2015              Arrange follow-up with [facility] (Dr Tremaine Cai [phone]) regarding abnormal bladder.

## 2018-02-02 NOTE — CONSULTS
[address]    Adriana Briones  MR#: [id number]  : 1935  ACCOUNT #: [de-identified]   DATE OF SERVICE: 2018    REASON FOR CONSULTATION:  Abnormal appearing bladder, possible urosepsis. ATTENDING REQUESTING CONSULTATION:  Dr. Jackelin Lujan with the hospitalist service here at [facility]. HISTORY OF PRESENT ILLNESS:  The patient is an 71-year-old gentleman who lives in a living facility. He was brought into the hospital with general malaise and in the ER, was found to have some hypotension and signs consistent with possible urosepsis. He, during the course of evaluation, had a CT of the abdomen and pelvis, which describes a thickened abnormal-appearing bladder. I am not sure whether or not a Busby catheter was placed. From what the patient describes, he did have a catheter, which was subsequently removed. He has blood cultures, which are growing gram-positive rods and preliminary urine culture growing gram-negative rods with final ID and sensitivity still pending. He has been maintained in the ICU. His white count has come down from 34,000 down to 13,000. He has been afebrile and appears nontoxic with stable blood pressure and is planning on being transferred to the floor. The patient seems to have some mild dementia, but seems to be a reasonable historian. He does not describe any dysuria or any specific abdominal pain. He describes longstanding frequency, urgency and some urge incontinence, as well as some decreased urinary stream.  He tells me he had this for many years and does not describe any new urinary symptoms. He denies any flank pain. CT was personally reviewed. The bladder is very abnormal and very thickened. There may be a tiny locule of air on the right side of the bladder. Again, I am not sure if he was catheterized. There is a prominent renal pelvis on the right, but no intrarenal hydronephrosis to speak of.   The right distal ureter looks slightly dilated to me. Problem, therefore, is abnormal-appearing bladder, location bladder, duration noted on CT 2 days ago. Context is probable cystitis, longstanding bladder symptoms. Modifying factors:  None identified. He currently has a condom catheter on and is voiding, there is clear urine in the bag with no blood. PAST MEDICAL HISTORY:  Arrhythmia, arthritis, constipation, hearing loss, dementia. PAST SURGICAL HISTORY:  Include hernia surgery, back surgery, endoscopy, tonsillectomy, colonoscopy. SOCIAL HISTORY:  He has a remote smoking history only. He lives in a living facility. REVIEW OF SYSTEMS:  A 12-point review of systems negative other than issues described above. PHYSICAL EXAMINATION  VITAL SIGNS:  He has been afebrile at least over the past 24 hours, 98.8 most recently. Blood pressure 104/47 with a heart rate of 69. He has had moderate urine output. GENERAL:  He is elderly, but awake, alert, oriented and appears to be in no acute distress. He is well-nourished, well developed. HEENT:  Eyes and conjunctivae appear normal.  Pupils are equal, round and reactive to light. Ears are normal.  Hearing is intact. NECK:  Supple. Thyroid does not seem enlarged. RESPIRATORY:  Effort of breathing is normal.  There is no fremitus. ABDOMEN:  Soft and benign. There is no CVA tenderness. There is no abdominal tenderness. There is no suprapubic tenderness. GENITALIA:  There is a condom catheter on, which is draining clear urine. Phallus is otherwise normal in appearance. Scrotum is normal.  Both testicles are descended, but slightly atrophic. RECTAL:  Sphincter tone is normal.  Prostate is perhaps 35 grams. It is smooth and nontender. PSYCHIATRIC:  Insight is questionable, affect seems normal.    NEUROLOGICAL:  Sensation is intact. SKIN:  Normal.  EXTREMITIES:  Without clubbing, cyanosis or edema.     LABORATORY DATA:  Currently show white count of 13.1, which as mentioned above is improved compared to previous. Creatinine is 1.98. Preliminary culture results as noted above. Urine at the time of admission showed significant white cells and was noted to be turbid in appearance. Imaging again was reviewed. Current CT is as described. I compared this to an older CT from 2013. The renal pelvis on the right is more prominent on the current scan. The bladder looks significantly different on the previous scan. There was a fairly distended bladder with possible diverticula. On the current scan, the bladder wall is markedly thickened and abnormal and as mentioned, it could be a tiny locule of air on the right side. ASSESSMENT AND PLAN:  This gentleman presents with concern of sepsis. Based on appearance of the CT, he may have cystitis. Clinically, he seems to be improved. He is voiding without difficulty. White count is coming down. Cultures are still pending. I think as long as he continues to make improvement, I would continue with the current course including broad spectrum IV antibiotics until cultures are back. Should he deteriorate or not improve, I would consider repeating the CT to make sure no suggestion of emphysematous cystitis. If so, he would need a catheter placed. For now, however, he seems to be doing well and I would follow along. Certainly, he will need followup with us after discharge and will ultimately require cystoscopy to evaluate the bladder and make sure there is no suggestion of malignancy. We will follow peripherally during this hospitalization. If there are any changes or concerns, please call.       Rani Bassett MD DPM / Juan Nam  D: 02/02/2018 10:45     T: 02/02/2018 11:22  JOB #: [id number]

## 2018-02-02 NOTE — CONSULTS
Urology Consult    Patient: Shira Flaherty MRN: [id number]  SSN: [id number]    YOB: 1935  Age: 81 y.o. Sex: male          Date of Consultation:  February 2, 2018  Requesting Physician: Ophelia Velásquez MD  Reason for Consultation:    Pre-existing [facility] Patient:   Physician: Dr. Shaquille Moore dictated [id number]      Assessment/Plan:     Clinically improving. Continue abx. Await cultures. Voiding into condom cath. Will need outpatient f/u and cysto at some point. Contact info added to D/C instructions. Call if any questions or concerns.      Signed By: Fadumo Ortiz MD  - February 2, 2018

## 2018-02-02 NOTE — CONSULTS
Chart reviewed. Bladder thickening likely benign due to bladder outlet obstruction but can be evaluated further with non-emergent outpatient cystoscopy. We will schedule f/u. No inpatient urologic intervention required.

## 2018-02-02 NOTE — PROGRESS NOTES
Hospitalist Progress Note  Dedrick Bush MD  Answering service: [phone] from in house phone  Cell: [phone]      Date of Service:  2018  NAME:  Rhys Gastelum  :  1935  MRN:  [id number]      Admission Summary: An 81-year-old white male with past medical history of arthritis, arrhythmia, constipation, hearing loss, hypertension, hyperlipidemia, joint pain, anemia, memory loss, who presented to the emergency department via EMS from assisted living facility with a chief complaint of generalized body aches.       Interval history / Subjective:   He said he feels better, no chest pain or shortness of breath, no RUQ pain     Assessment & Plan:     Septic shock due to UTI POA  -continue zosyn and IVF, BP improved, off levophed, vancomycin discontinued  - monitor BP  -urine cx grow pseudomonas aeruginosa sensitive to zosyn  - blood cx no growth   closteridium perfringens, gram positive rods, possible contamination  -repeated blood cx 2  -leukocytosis improving    Bladder wall thickening on CT   -infection vs neoplasm vs due to bladder outlet obstruction  -no hematuria   -seen by urologist and recommendation for outpatient cystoscopy     Dehydration   -continue normal saline  -monitor I/O    Elevated transaminase possible due to sepsis  -improving, continue IVF    Cholelithiasis on CT  -no RUQ tenderness     CKD stage III  -creatinine improving, on IVF  -monitor renal function and urine output     Anemia of chronic disease   -H/H is trending down, no evidence of active bleeding  -monitor H/H    Hypokalemia   -replace with iv kcl and repeat in am,     Pressure ulcer at left heel and sacral area  -consult wound care nurse     Debility  -he uses wheelchair and walker for ambulation      Code status: Full Code  DVT prophylaxis: Heparin    Care Plan discussed with: Patient/Family, Nurse and   Disposition: TBD Hospital Problems  Date Reviewed: 2/1/2018          Codes Class Noted POA    Dehydration ICD-10-CM: E86.0  ICD-9-CM: 276.51  1/31/2018 Unknown        Leukocytosis ICD-10-CM: D72.829  ICD-9-CM: 288.60  1/31/2018 Unknown        * (Principal)Shock ([facility].) ICD-10-CM: R57.9  ICD-9-CM: 785.50  1/31/2018 Unknown        Hypotension ICD-10-CM: I95.9  ICD-9-CM: 458.9  1/31/2018 Unknown        Abnormal urinalysis ICD-10-CM: R82.90  ICD-9-CM: 791.9  1/31/2018 Unknown        SIRS (systemic inflammatory response syndrome) ([facility].) ICD-10-CM: R65.10  ICD-9-CM: 995.90  1/31/2018 Unknown              Vital Signs:    Last 24hrs VS reviewed since prior progress note. Most recent are:  Visit Vitals    /57    Pulse 80    Temp 98.2 °F (36.8 °C)    Resp 16    Ht 5' 7\" (1.702 m)    Wt 69.1 kg (152 lb 5.4 oz)    SpO2 96%    BMI 23.86 kg/m2         Intake/Output Summary (Last 24 hours) at 02/02/18 1608  Last data filed at 02/02/18 1300   Gross per 24 hour   Intake             4160 ml   Output             2000 ml   Net             2160 ml        Physical Examination:             Constitutional:  No acute distress, cooperative, pleasant    ENT:  Oral mucous moist, oropharynx benign. Neck supple,    Resp:  Decrease bronchial breath sound bilaterally. No wheezing/rhonchi/rales. No accessory muscle use   CV:  Regular rhythm, normal rate, no murmurs, gallops, rubs    GI:  Soft, non distended, non tender.  normoactive bowel sounds, no hepatosplenomegaly     Musculoskeletal:  No edema    Neurologic:  Conscious and alert, oriented to place and person, moves all extremities     Skin:  left heel and sacral area pressure ulcer       Data Review:    Review and/or order of clinical lab test      Labs:     Recent Labs      02/02/18   0452  02/01/18   0611   WBC  13.1*  29.6*   HGB  7.5*  8.7*   HCT  23.1*  26.7*   PLT  219  246     Recent Labs      02/02/18   0452  02/01/18   0611  01/31/18   1928   NA  144  145  138   K  3.0*  2.9*  3.9   CL 112*  109*  98   CO2  23  23  27   BUN  56*  63*  83*   CREA  1.98*  2.26*  2.99*   GLU  80  101*  126*   CA  7.1*  7.1*  7.8*   MG  1.7   --    --    PHOS  3.6   --    --      Recent Labs      02/02/18   0452  02/01/18   0611  01/31/18 1928   SGOT  56*  101*  141*   ALT  73  114*  155*   AP  384*  532*  704*   TBILI  0.8  0.9  1.2*   TP  5.1*  5.8*  6.9   ALB  1.6*  2.1*  2.5*   GLOB  3.5  3.7  4.4*     Recent Labs      01/31/18 1928   INR  1.1   PTP  11.6*   APTT  38.6*      No results for input(s): FE, TIBC, PSAT, FERR in the last 72 hours. Lab Results   Component Value Date/Time    Folate 8.4 01/23/2015 07:19 PM      No results for input(s): PH, PCO2, PO2 in the last 72 hours.   Recent Labs      01/31/18 1928   TROIQ  <0.04     No results found for: CHOL, CHOLX, CHLST, CHOLV, HDL, LDL, LDLC, DLDLP, TGLX, TRIGL, TRIGP, CHHD, CHHDX  Lab Results   Component Value Date/Time    Glucose (POC) 118 01/27/2015 12:15 PM    Glucose (POC) 117 01/27/2015 07:26 AM    Glucose (POC) 168 01/26/2015 09:03 PM    Glucose (POC) 146 01/26/2015 04:26 PM    Glucose (POC) 144 01/26/2015 11:27 AM     Lab Results   Component Value Date/Time    Color DARK YELLOW 01/31/2018 08:48 PM    Appearance TURBID 01/31/2018 08:48 PM    Specific gravity 1.015 01/31/2018 08:48 PM    pH (UA) 5.5 01/31/2018 08:48 PM    Protein 30 01/31/2018 08:48 PM    Glucose NEGATIVE  01/31/2018 08:48 PM    Ketone NEGATIVE  01/31/2018 08:48 PM    Bilirubin NEGATIVE  01/31/2018 08:48 PM    Urobilinogen 1.0 01/31/2018 08:48 PM    Nitrites NEGATIVE  01/31/2018 08:48 PM    Leukocyte Esterase LARGE 01/31/2018 08:48 PM    Epithelial cells FEW 01/31/2018 08:48 PM    Bacteria NEGATIVE  01/31/2018 08:48 PM    WBC >100 01/31/2018 08:48 PM    RBC 0-5 01/31/2018 08:48 PM         Medications Reviewed:     Current Facility-Administered Medications   Medication Dose Route Frequency    balsam peru-castor oil (VENELEX)  mg/gram ointment   Topical BID    piperacillin-tazobactam (ZOSYN) 10.125 g in  mL continuous 24 hr infusion  10.125 g IntraVENous Q24H    sodium chloride (NS) flush 5-10 mL  5-10 mL IntraVENous Q8H    sodium chloride (NS) flush 5-10 mL  5-10 mL IntraVENous PRN    sodium chloride (NS) flush 10-30 mL  10-30 mL InterCATHeter PRN    sodium chloride (NS) flush 10 mL  10 mL InterCATHeter Q24H    sodium chloride (NS) flush 10 mL  10 mL InterCATHeter PRN    sodium chloride (NS) flush 10-40 mL  10-40 mL InterCATHeter Q8H    sodium chloride (NS) flush 20 mL  20 mL InterCATHeter Q24H    alteplase (CATHFLO) 1 mg in sterile water (preservative free) 1 mL injection  1 mg InterCATHeter PRN    famotidine (PF) (PEPCID) injection 20 mg  20 mg IntraVENous DAILY    heparin (porcine) injection 5,000 Units  5,000 Units SubCUTAneous Q12H    sodium chloride (NS) flush 5-10 mL  5-10 mL IntraVENous PRN    0.9% sodium chloride infusion  75 mL/hr IntraVENous CONTINUOUS     ______________________________________________________________________  EXPECTED LENGTH OF STAY: 4d 21h  ACTUAL LENGTH OF STAY:          2                 Allison Brito MD

## 2018-02-02 NOTE — PROGRESS NOTES
Day #3 of Zosyn  Indication:  UTI  Current regimen:  6.75 gm IV CI (over 24 hours)  Abx regimen: Zosyn + Vancomycin  Recent Labs      18   0452  18   0611  18   1928   WBC  13.1*  29.6*  34.1*   CREA  1.98*  2.26*  2.99*   BUN  56*  63*  83*     Est CrCl: ~20-30 ml/min; UO: >1 ml/kg/hr  Temp (24hrs), Av.4 °F (36.9 °C), Min:97.9 °F (36.6 °C), Max:98.8 °F (37.1 °C)    Cultures:   Blood: NGTD   Blood #2: Clostridium perfringens in 1/2 bottles, pending   Urine: 50k Pseudomonas aeruginosa, final   Blood: NGTD    Plan: Change to 10.125 gm IV CI over 24 hours per [facility] P&T Committee Protocol with respect to renal function. Pharmacy will continue to monitor patient daily and will make dosage adjustments based upon changing renal function.

## 2018-02-02 NOTE — PROGRESS NOTES
0800: Report received from Miguelangel Mejia RN using [employer]. Care assumed. Pt resting in bed. Denies any c/o pain, but states that his legs feel heavy. Asked to reposition BLE. See doc flow sheets for assessment. 0930: Dr Trav Delacruz in to see pt.     1000: Dr Gretta De La Rosa completing 124 [facility] rounds. 1030: WOCN at bedside. 1245: Message left with Dtr Vibha Khan to notify of transfer. 1300: TRANSFER - OUT REPORT:  Verbal report given to 1010 Yadiel Bates, RN on Raj Angel  being transferred to  for routine progression of care     Report consisted of patients Situation, Background, Assessment and Recommendations(SBAR). Information from the following report(s) SBAR, Intake/Output, MAR, Recent Results and Cardiac Rhythm NSR was reviewed with the receiving nurse. Lines:   Triple Lumen 02/01/18 Right Subclavian (Active)   Central Line Being Utilized Yes 2/2/2018 12:00 PM   Criteria for Appropriate Use Hemodynamically unstable, requiring monitoring lines, vasopressors, or volume resuscitation 2/2/2018 12:00 PM   Site Assessment Clean, dry, & intact 2/2/2018 12:00 PM   Infiltration Assessment 0 2/2/2018 12:00 PM   Affected Extremity/Extremities Color distal to insertion site pink (or appropriate for race) 2/2/2018 12:00 PM   Date of Last Dressing Change 02/01/18 2/2/2018 12:00 PM   Dressing Status Clean, dry, & intact 2/2/2018 12:00 PM   Dressing Type Transparent; Bacteriocidal;Disk with Chlorhexadine gluconate (CHG) 2/2/2018 12:00 PM   Action Taken Open ports on tubing capped 2/2/2018 12:00 PM   Proximal Hub Color/Line Status White 2/2/2018 12:00 PM   Positive Blood Return (Medial Site) Yes 2/2/2018 12:00 PM   Medial Hub Color/Line Status Blue 2/2/2018 12:00 PM   Positive Blood Return (Lateral Site) Yes 2/2/2018 12:00 PM   Distal Hub Color/Line Status Brown 2/2/2018 12:00 PM   Positive Blood Return (Site #3) Yes 2/2/2018 12:00 PM   Alcohol Cap Used Yes 2/2/2018 12:00 PM   Opportunity for questions and clarification was provided.     Patient transported with:  O2 @ 2 liters  Tech

## 2018-02-03 LAB
ALBUMIN SERPL-MCNC: 1.7 G/DL (ref 3.5–5)
ALBUMIN/GLOB SERPL: 0.5 {RATIO} (ref 1.1–2.2)
ALP SERPL-CCNC: 343 U/L (ref 45–117)
ALT SERPL-CCNC: 57 U/L (ref 12–78)
ANION GAP SERPL CALC-SCNC: 7 MMOL/L (ref 5–15)
AST SERPL-CCNC: 33 U/L (ref 15–37)
BASOPHILS # BLD: 0.1 K/UL (ref 0–0.1)
BASOPHILS NFR BLD: 1 % (ref 0–1)
BILIRUB SERPL-MCNC: 0.9 MG/DL (ref 0.2–1)
BUN SERPL-MCNC: 45 MG/DL (ref 6–20)
BUN/CREAT SERPL: 26 (ref 12–20)
CALCIUM SERPL-MCNC: 7.4 MG/DL (ref 8.5–10.1)
CHLORIDE SERPL-SCNC: 114 MMOL/L (ref 97–108)
CO2 SERPL-SCNC: 23 MMOL/L (ref 21–32)
CREAT SERPL-MCNC: 1.7 MG/DL (ref 0.7–1.3)
DIFFERENTIAL METHOD BLD: ABNORMAL
EOSINOPHIL # BLD: 0.2 K/UL (ref 0–0.4)
EOSINOPHIL NFR BLD: 2 % (ref 0–7)
ERYTHROCYTE [DISTWIDTH] IN BLOOD BY AUTOMATED COUNT: 21.3 % (ref 11.5–14.5)
GLOBULIN SER CALC-MCNC: 3.6 G/DL (ref 2–4)
GLUCOSE SERPL-MCNC: 85 MG/DL (ref 65–100)
HCT VFR BLD AUTO: 25 % (ref 36.6–50.3)
HGB BLD-MCNC: 8.1 G/DL (ref 12.1–17)
IMM GRANULOCYTES # BLD: 0.2 K/UL (ref 0–0.04)
IMM GRANULOCYTES NFR BLD AUTO: 2 % (ref 0–0.5)
LYMPHOCYTES # BLD: 0.8 K/UL (ref 0.8–3.5)
LYMPHOCYTES NFR BLD: 9 % (ref 12–49)
MAGNESIUM SERPL-MCNC: 2.2 MG/DL (ref 1.6–2.4)
MCH RBC QN AUTO: 29.3 PG (ref 26–34)
MCHC RBC AUTO-ENTMCNC: 32.4 G/DL (ref 30–36.5)
MCV RBC AUTO: 90.6 FL (ref 80–99)
MONOCYTES # BLD: 0.9 K/UL (ref 0–1)
MONOCYTES NFR BLD: 11 % (ref 5–13)
NEUTS BAND NFR BLD MANUAL: 3 %
NEUTS SEG # BLD: 6.3 K/UL (ref 1.8–8)
NEUTS SEG NFR BLD: 72 % (ref 32–75)
NRBC # BLD: 0 K/UL (ref 0–0.01)
NRBC BLD-RTO: 0 PER 100 WBC
PLATELET # BLD AUTO: 261 K/UL (ref 150–400)
PLATELET COMMENTS,PCOM: ABNORMAL
PMV BLD AUTO: 11.4 FL (ref 8.9–12.9)
POTASSIUM SERPL-SCNC: 3.8 MMOL/L (ref 3.5–5.1)
PROT SERPL-MCNC: 5.3 G/DL (ref 6.4–8.2)
RBC # BLD AUTO: 2.76 M/UL (ref 4.1–5.7)
RBC MORPH BLD: ABNORMAL
SODIUM SERPL-SCNC: 144 MMOL/L (ref 136–145)
WBC # BLD AUTO: 8.5 K/UL (ref 4.1–11.1)
WBC MORPH BLD: ABNORMAL

## 2018-02-03 PROCEDURE — 74011000250 HC RX REV CODE- 250: Performed by: INTERNAL MEDICINE

## 2018-02-03 PROCEDURE — 85027 COMPLETE CBC AUTOMATED: CPT | Performed by: HOSPITALIST

## 2018-02-03 PROCEDURE — 74011250636 HC RX REV CODE- 250/636: Performed by: INTERNAL MEDICINE

## 2018-02-03 PROCEDURE — 80053 COMPREHEN METABOLIC PANEL: CPT | Performed by: HOSPITALIST

## 2018-02-03 PROCEDURE — 74011250636 HC RX REV CODE- 250/636: Performed by: HOSPITALIST

## 2018-02-03 PROCEDURE — 65270000029 HC RM PRIVATE

## 2018-02-03 PROCEDURE — 83735 ASSAY OF MAGNESIUM: CPT | Performed by: HOSPITALIST

## 2018-02-03 PROCEDURE — 36415 COLL VENOUS BLD VENIPUNCTURE: CPT | Performed by: HOSPITALIST

## 2018-02-03 RX ADMIN — HEPARIN SODIUM 5000 UNITS: 5000 INJECTION, SOLUTION INTRAVENOUS; SUBCUTANEOUS at 09:50

## 2018-02-03 RX ADMIN — HEPARIN SODIUM 5000 UNITS: 5000 INJECTION, SOLUTION INTRAVENOUS; SUBCUTANEOUS at 22:44

## 2018-02-03 RX ADMIN — SODIUM CHLORIDE 50 ML/HR: 900 INJECTION, SOLUTION INTRAVENOUS at 20:35

## 2018-02-03 RX ADMIN — PIPERACILLIN AND TAZOBACTAM 10.12 G: 3; .375 INJECTION, POWDER, FOR SOLUTION INTRAVENOUS at 14:06

## 2018-02-03 RX ADMIN — CASTOR OIL AND BALSAM, PERU: 788; 87 OINTMENT TOPICAL at 09:49

## 2018-02-03 RX ADMIN — FAMOTIDINE 20 MG: 10 INJECTION, SOLUTION INTRAVENOUS at 09:49

## 2018-02-03 RX ADMIN — Medication 20 ML: at 08:00

## 2018-02-03 RX ADMIN — Medication 10 ML: at 22:00

## 2018-02-03 RX ADMIN — Medication 10 ML: at 14:00

## 2018-02-03 RX ADMIN — SODIUM CHLORIDE 50 ML/HR: 900 INJECTION, SOLUTION INTRAVENOUS at 22:44

## 2018-02-03 RX ADMIN — CASTOR OIL AND BALSAM, PERU: 788; 87 OINTMENT TOPICAL at 20:34

## 2018-02-03 RX ADMIN — Medication 10 ML: at 08:00

## 2018-02-03 RX ADMIN — SODIUM CHLORIDE 75 ML/HR: 900 INJECTION, SOLUTION INTRAVENOUS at 06:43

## 2018-02-03 RX ADMIN — Medication 10 ML: at 10:00

## 2018-02-03 NOTE — ROUTINE PROCESS
Primary Nurse Palak Gross RN and Claude Mann RN performed a dual skin assessment on this patient Impairment noted- see wound doc flow sheet  Waldo score is 11

## 2018-02-03 NOTE — PROGRESS NOTES
Problem: Falls - Risk of  Goal: *Absence of Falls  Document Rachel Fall Risk and appropriate interventions in the flowsheet.    Outcome: Progressing Towards Goal  Fall Risk Interventions:       Mentation Interventions: Adequate sleep, hydration, pain control, Door open when patient unattended, Reorient patient    Medication Interventions: Patient to call before getting OOB, Evaluate medications/consider consulting pharmacy    Elimination Interventions: Call light in reach

## 2018-02-03 NOTE — PROGRESS NOTES
Hospitalist Progress Note  Chris Baker MD  Answering service: [phone] from in house phone  Cell: [phone]      Date of Service:  2/3/2018  NAME:  Ples Gosselin  :  1935  MRN:  [id number]      Admission Summary: An 81-year-old white male with past medical history of arthritis, arrhythmia, constipation, hearing loss, hypertension, hyperlipidemia, joint pain, anemia, memory loss, who presented to the emergency department via EMS from assisted living facility with a chief complaint of generalized body aches. Interval history / Subjective:   He said he feels better but weaker than his base line, no chest pain or shortness of breath, no RUQ pain.        Assessment & Plan:     Septic shock due to UTI POA  -continue zosyn and IVF, BP improved, off levophed, vancomycin discontinued  - monitor BP  -urine cx grow pseudomonas aeruginosa sensitive to zosyn  - blood cx no growth   closteridium perfringens, gram positive rods, possible contamination  -repeated blood cx  no growth so far  -leukocytosis improving    Bladder wall thickening on CT   -infection vs neoplasm vs due to bladder outlet obstruction  -no hematuria   -seen by urologist and recommendation for outpatient cystoscopy     Dehydration   -continue normal saline  -monitor I/O    Elevated transaminase possible due to sepsis  -improving, continue IVF    Cholelithiasis on CT  -no RUQ tenderness     CKD stage III  -creatinine improving, on IVF  -monitor renal function and urine output     Anemia of chronic disease   -H/H is trending down, no evidence of active bleeding  -monitor H/H    Hypokalemia   -replaced and resolved    Pressure ulcer at left heel and sacral area  -consult wound care nurse     Debility  -he said he usually stay in recliner and uses wheelchair with help      Code status: Full Code  DVT prophylaxis: Heparin    Care Plan discussed with: Patient/Family, Nurse and   Disposition: [facility]     Hospital Problems  Date Reviewed: 2/1/2018          Codes Class Noted POA    Dehydration ICD-10-CM: E86.0  ICD-9-CM: 276.51  1/31/2018 Unknown        Leukocytosis ICD-10-CM: D72.829  ICD-9-CM: 288.60  1/31/2018 Unknown        * (Principal)Shock ([facility].) ICD-10-CM: R57.9  ICD-9-CM: 785.50  1/31/2018 Unknown        Hypotension ICD-10-CM: I95.9  ICD-9-CM: 458.9  1/31/2018 Unknown        Abnormal urinalysis ICD-10-CM: R82.90  ICD-9-CM: 791.9  1/31/2018 Unknown        SIRS (systemic inflammatory response syndrome) ([facility].) ICD-10-CM: R65.10  ICD-9-CM: 995.90  1/31/2018 Unknown              Vital Signs:    Last 24hrs VS reviewed since prior progress note. Most recent are:  Visit Vitals    /63    Pulse 74    Temp 98.9 °F (37.2 °C)    Resp 16    Ht 5' 7\" (1.702 m)    Wt 69.1 kg (152 lb 5.4 oz)    SpO2 96%    BMI 23.86 kg/m2         Intake/Output Summary (Last 24 hours) at 02/03/18 1113  Last data filed at 02/03/18 0401   Gross per 24 hour   Intake              170 ml   Output              950 ml   Net             -780 ml        Physical Examination:             Constitutional:  No acute distress, cooperative, pleasant    ENT:  Oral mucous moist, oropharynx benign. Neck supple,    Resp:  Decrease bronchial breath sound bilaterally. No wheezing/rhonchi/rales. No accessory muscle use   CV:  Regular rhythm, normal rate, no murmurs, gallops, rubs    GI:  Soft, non distended, non tender.  normoactive bowel sounds, no hepatosplenomegaly     Musculoskeletal:  No edema    Neurologic:  Conscious and alert, oriented to place and person, moves all extremities     Skin:  left heel and sacral area pressure ulcer       Data Review:    Review and/or order of clinical lab test      Labs:     Recent Labs      02/03/18   0405  02/02/18   0452   WBC  8.5  13.1*   HGB  8.1*  7.5*   HCT  25.0*  23.1*   PLT  261  219     Recent Labs      02/03/18   0405  02/02/18   045 02/01/18   0611   NA  144  144  145   K  3.8  3.0*  2.9*   CL  114*  112*  109*   CO2  23  23  23   BUN  45*  56*  63*   CREA  1.70*  1.98*  2.26*   GLU  85  80  101*   CA  7.4*  7.1*  7.1*   MG  2.2  1.7   --    PHOS   --   3.6   --      Recent Labs      02/03/18   0405  02/02/18   0452  02/01/18   0611   SGOT  33  56*  101*   ALT  57  73  114*   AP  343*  384*  532*   TBILI  0.9  0.8  0.9   TP  5.3*  5.1*  5.8*   ALB  1.7*  1.6*  2.1*   GLOB  3.6  3.5  3.7     Recent Labs      01/31/18 1928   INR  1.1   PTP  11.6*   APTT  38.6*      No results for input(s): FE, TIBC, PSAT, FERR in the last 72 hours. Lab Results   Component Value Date/Time    Folate 8.4 01/23/2015 07:19 PM      No results for input(s): PH, PCO2, PO2 in the last 72 hours.   Recent Labs      01/31/18 1928   TROIQ  <0.04     No results found for: CHOL, CHOLX, CHLST, CHOLV, HDL, LDL, LDLC, DLDLP, TGLX, TRIGL, TRIGP, CHHD, CHHDX  Lab Results   Component Value Date/Time    Glucose (POC) 118 01/27/2015 12:15 PM    Glucose (POC) 117 01/27/2015 07:26 AM    Glucose (POC) 168 01/26/2015 09:03 PM    Glucose (POC) 146 01/26/2015 04:26 PM    Glucose (POC) 144 01/26/2015 11:27 AM     Lab Results   Component Value Date/Time    Color DARK YELLOW 01/31/2018 08:48 PM    Appearance TURBID 01/31/2018 08:48 PM    Specific gravity 1.015 01/31/2018 08:48 PM    pH (UA) 5.5 01/31/2018 08:48 PM    Protein 30 01/31/2018 08:48 PM    Glucose NEGATIVE  01/31/2018 08:48 PM    Ketone NEGATIVE  01/31/2018 08:48 PM    Bilirubin NEGATIVE  01/31/2018 08:48 PM    Urobilinogen 1.0 01/31/2018 08:48 PM    Nitrites NEGATIVE  01/31/2018 08:48 PM    Leukocyte Esterase LARGE 01/31/2018 08:48 PM    Epithelial cells FEW 01/31/2018 08:48 PM    Bacteria NEGATIVE  01/31/2018 08:48 PM    WBC >100 01/31/2018 08:48 PM    RBC 0-5 01/31/2018 08:48 PM         Medications Reviewed:     Current Facility-Administered Medications   Medication Dose Route Frequency    balsam peru-castor oil (VENELEX)  mg/gram ointment   Topical BID    piperacillin-tazobactam (ZOSYN) 10.125 g in  mL continuous 24 hr infusion  10.125 g IntraVENous Q24H    sodium chloride (NS) flush 5-10 mL  5-10 mL IntraVENous Q8H    sodium chloride (NS) flush 5-10 mL  5-10 mL IntraVENous PRN    sodium chloride (NS) flush 10-30 mL  10-30 mL InterCATHeter PRN    sodium chloride (NS) flush 10 mL  10 mL InterCATHeter Q24H    sodium chloride (NS) flush 10 mL  10 mL InterCATHeter PRN    sodium chloride (NS) flush 10-40 mL  10-40 mL InterCATHeter Q8H    sodium chloride (NS) flush 20 mL  20 mL InterCATHeter Q24H    alteplase (CATHFLO) 1 mg in sterile water (preservative free) 1 mL injection  1 mg InterCATHeter PRN    famotidine (PF) (PEPCID) injection 20 mg  20 mg IntraVENous DAILY    heparin (porcine) injection 5,000 Units  5,000 Units SubCUTAneous Q12H    sodium chloride (NS) flush 5-10 mL  5-10 mL IntraVENous PRN    0.9% sodium chloride infusion  75 mL/hr IntraVENous CONTINUOUS     ______________________________________________________________________  EXPECTED LENGTH OF STAY: 4d 21h  ACTUAL LENGTH OF STAY:          3                 Reyes Patten MD

## 2018-02-03 NOTE — PROGRESS NOTES
0030 - Bedside and Verbal shift change report given to aleksandar andrews (oncoming nurse) by Elizabeth Barker (offgoing nurse). Report included the following information SBAR, Kardex, Intake/Output, MAR and Recent Results. 0800 - Bedside and Verbal shift change report given to benton (oncoming nurse) by Kendall Almazan (offgoing nurse). Report included the following information SBAR, Kardex, Intake/Output, MAR and Recent Results.

## 2018-02-04 LAB
ALBUMIN SERPL-MCNC: 1.7 G/DL (ref 3.5–5)
ALBUMIN/GLOB SERPL: 0.5 {RATIO} (ref 1.1–2.2)
ALP SERPL-CCNC: 309 U/L (ref 45–117)
ALT SERPL-CCNC: 48 U/L (ref 12–78)
ANION GAP SERPL CALC-SCNC: 8 MMOL/L (ref 5–15)
AST SERPL-CCNC: 28 U/L (ref 15–37)
BILIRUB SERPL-MCNC: 0.9 MG/DL (ref 0.2–1)
BUN SERPL-MCNC: 34 MG/DL (ref 6–20)
BUN/CREAT SERPL: 21 (ref 12–20)
CALCIUM SERPL-MCNC: 7.5 MG/DL (ref 8.5–10.1)
CHLORIDE SERPL-SCNC: 115 MMOL/L (ref 97–108)
CO2 SERPL-SCNC: 23 MMOL/L (ref 21–32)
CREAT SERPL-MCNC: 1.59 MG/DL (ref 0.7–1.3)
ERYTHROCYTE [DISTWIDTH] IN BLOOD BY AUTOMATED COUNT: 21.2 % (ref 11.5–14.5)
GLOBULIN SER CALC-MCNC: 3.7 G/DL (ref 2–4)
GLUCOSE SERPL-MCNC: 78 MG/DL (ref 65–100)
HCT VFR BLD AUTO: 26.7 % (ref 36.6–50.3)
HGB BLD-MCNC: 8.5 G/DL (ref 12.1–17)
MCH RBC QN AUTO: 29.5 PG (ref 26–34)
MCHC RBC AUTO-ENTMCNC: 31.8 G/DL (ref 30–36.5)
MCV RBC AUTO: 92.7 FL (ref 80–99)
NRBC # BLD: 0 K/UL (ref 0–0.01)
NRBC BLD-RTO: 0 PER 100 WBC
PLATELET # BLD AUTO: 279 K/UL (ref 150–400)
PMV BLD AUTO: 11 FL (ref 8.9–12.9)
POTASSIUM SERPL-SCNC: 4.1 MMOL/L (ref 3.5–5.1)
PROT SERPL-MCNC: 5.4 G/DL (ref 6.4–8.2)
RBC # BLD AUTO: 2.88 M/UL (ref 4.1–5.7)
SODIUM SERPL-SCNC: 146 MMOL/L (ref 136–145)
WBC # BLD AUTO: 6.6 K/UL (ref 4.1–11.1)

## 2018-02-04 PROCEDURE — 85027 COMPLETE CBC AUTOMATED: CPT | Performed by: HOSPITALIST

## 2018-02-04 PROCEDURE — 80053 COMPREHEN METABOLIC PANEL: CPT | Performed by: HOSPITALIST

## 2018-02-04 PROCEDURE — 74011000258 HC RX REV CODE- 258: Performed by: HOSPITALIST

## 2018-02-04 PROCEDURE — 36415 COLL VENOUS BLD VENIPUNCTURE: CPT | Performed by: HOSPITALIST

## 2018-02-04 PROCEDURE — 65270000029 HC RM PRIVATE

## 2018-02-04 PROCEDURE — 74011250636 HC RX REV CODE- 250/636: Performed by: INTERNAL MEDICINE

## 2018-02-04 PROCEDURE — 74011250636 HC RX REV CODE- 250/636: Performed by: HOSPITALIST

## 2018-02-04 PROCEDURE — 74011250637 HC RX REV CODE- 250/637: Performed by: HOSPITALIST

## 2018-02-04 PROCEDURE — 77010033678 HC OXYGEN DAILY

## 2018-02-04 RX ORDER — FAMOTIDINE 20 MG/1
20 TABLET, FILM COATED ORAL 2 TIMES DAILY
Status: DISCONTINUED | OUTPATIENT
Start: 2018-02-04 | End: 2018-02-04 | Stop reason: DRUGHIGH

## 2018-02-04 RX ORDER — SODIUM CHLORIDE 450 MG/100ML
50 INJECTION, SOLUTION INTRAVENOUS CONTINUOUS
Status: DISCONTINUED | OUTPATIENT
Start: 2018-02-04 | End: 2018-02-07

## 2018-02-04 RX ORDER — FAMOTIDINE 20 MG/1
20 TABLET, FILM COATED ORAL DAILY
Status: DISCONTINUED | OUTPATIENT
Start: 2018-02-04 | End: 2018-02-08 | Stop reason: HOSPADM

## 2018-02-04 RX ADMIN — CASTOR OIL AND BALSAM, PERU: 788; 87 OINTMENT TOPICAL at 09:49

## 2018-02-04 RX ADMIN — CASTOR OIL AND BALSAM, PERU: 788; 87 OINTMENT TOPICAL at 18:00

## 2018-02-04 RX ADMIN — Medication 10 ML: at 07:04

## 2018-02-04 RX ADMIN — Medication 10 ML: at 14:00

## 2018-02-04 RX ADMIN — Medication 10 ML: at 22:02

## 2018-02-04 RX ADMIN — SODIUM CHLORIDE 50 ML/HR: 450 INJECTION, SOLUTION INTRAVENOUS at 13:41

## 2018-02-04 RX ADMIN — FAMOTIDINE 20 MG: 20 TABLET, FILM COATED ORAL at 09:45

## 2018-02-04 RX ADMIN — HEPARIN SODIUM 5000 UNITS: 5000 INJECTION, SOLUTION INTRAVENOUS; SUBCUTANEOUS at 09:44

## 2018-02-04 RX ADMIN — HEPARIN SODIUM 5000 UNITS: 5000 INJECTION, SOLUTION INTRAVENOUS; SUBCUTANEOUS at 22:01

## 2018-02-04 RX ADMIN — Medication 20 ML: at 07:04

## 2018-02-04 RX ADMIN — PIPERACILLIN AND TAZOBACTAM 10.12 G: 3; .375 INJECTION, POWDER, FOR SOLUTION INTRAVENOUS at 13:38

## 2018-02-04 NOTE — PROGRESS NOTES
Chart reviewed. Pt refused PT eval at this time. Per pt, he lives in an [facility] which he cannot recall the name. He uses a w/c for mobility and performs transfer with assistance using a RW.   Will see tomorrow as able for PT eval.

## 2018-02-04 NOTE — PROGRESS NOTES
Spiritual Care Partner Volunteer visited patient in [address] [place] [address] on 2/4/2018.   Documented by:  Chaplain Sher MDiv, MS, [address]  [address] [place] [address] ([id number])

## 2018-02-04 NOTE — PROGRESS NOTES
Problem: Falls - Risk of  Goal: *Absence of Falls  Document Rachel Fall Risk and appropriate interventions in the flowsheet.    Outcome: Progressing Towards Goal  Fall Risk Interventions:       Mentation Interventions: Adequate sleep, hydration, pain control, Bed/chair exit alarm, Door open when patient unattended, Gait belt with transfers/ambulation, Toileting rounds    Medication Interventions: Evaluate medications/consider consulting pharmacy, Patient to call before getting OOB, Teach patient to arise slowly, Utilize gait belt for transfers/ambulation    Elimination Interventions: Call light in reach, Patient to call for help with toileting needs, Toilet paper/wipes in reach, Toileting schedule/hourly rounds

## 2018-02-04 NOTE — PROGRESS NOTES
Problem: Falls - Risk of  Goal: *Absence of Falls  Document Rachel Fall Risk and appropriate interventions in the flowsheet.    Outcome: Progressing Towards Goal  Fall Risk Interventions:       Mentation Interventions: Adequate sleep, hydration, pain control, Door open when patient unattended, Familiar objects from home    Medication Interventions: Evaluate medications/consider consulting pharmacy, Patient to call before getting OOB, Teach patient to arise slowly, Utilize gait belt for transfers/ambulation    Elimination Interventions: Call light in reach, Elevated toilet seat, Patient to call for help with toileting needs, Toilet paper/wipes in reach, Toileting schedule/hourly rounds

## 2018-02-04 NOTE — PROGRESS NOTES
Hospitalist Progress Note  Jas Bacon MD  Answering service: [phone] from in house phone  Cell: [phone]      Date of Service:  2018  NAME:  Erin Hernadez  :  1935  MRN:  [id number]      Admission Summary: An 55-year-old white male with past medical history of arthritis, arrhythmia, constipation, hearing loss, hypertension, hyperlipidemia, joint pain, anemia, memory loss, who presented to the emergency department via EMS from assisted living facility with a chief complaint of generalized body aches. Interval history / Subjective:   He said he feels better but weaker than his base line, no chest pain or shortness of breath, no RUQ pain.        Assessment & Plan:     Septic shock due to UTI POA  -on zosyn and IVF, BP improved, switch iv abx to po on discharge  -off levophed, vancomycin discontinued  -BP normal    Pseudomonas Aeruginosa UTI POA  -continue zosyn started on ,   -urine cx grow pseudomonas aeruginosa sensitive to zosyn, resistant to floroquinolol  - blood cx no growth   closteridium perfringens, gram positive rods, possible contamination  -repeated blood cx  no growth so far  -leukocytosis improved    Bladder wall thickening on CT   -infection vs neoplasm vs due to bladder outlet obstruction  -no hematuria   -seen by urologist and recommendation for outpatient cystoscopy     Dehydration   -continue normal saline  -monitor I/O    Elevated transaminase possible due to sepsis  -improved, off IVF    Cholelithiasis on CT  -no RUQ tenderness     CKD stage III  -creatinine improving, on IVF  -monitor renal function and urine output     Anemia of chronic disease   -H/H is trending down, no evidence of active bleeding  -monitor H/H    Hypokalemia   -replaced and resolved    Pressure ulcer at left heel and sacral area  -consult wound care nurse     Debility  -he said he usually stay in recliner and uses wheelchair with help      Code status: Full Code  DVT prophylaxis: Heparin    Care Plan discussed with: Patient/Family, Nurse and   Disposition: From assisted living facility     Hospital Problems  Date Reviewed: 2/1/2018          Codes Class Noted POA    Dehydration ICD-10-CM: E86.0  ICD-9-CM: 276.51  1/31/2018 Unknown        Leukocytosis ICD-10-CM: D72.829  ICD-9-CM: 288.60  1/31/2018 Unknown        * (Principal)Shock ([facility].) ICD-10-CM: R57.9  ICD-9-CM: 785.50  1/31/2018 Unknown        Hypotension ICD-10-CM: I95.9  ICD-9-CM: 458.9  1/31/2018 Unknown        Abnormal urinalysis ICD-10-CM: R82.90  ICD-9-CM: 791.9  1/31/2018 Unknown        SIRS (systemic inflammatory response syndrome) ([facility].) ICD-10-CM: R65.10  ICD-9-CM: 995.90  1/31/2018 Unknown              Vital Signs:    Last 24hrs VS reviewed since prior progress note. Most recent are:  Visit Vitals    /75    Pulse 74    Temp 97.4 °F (36.3 °C)    Resp 18    Ht 5' 7\" (1.702 m)    Wt 75.6 kg (166 lb 10.7 oz)    SpO2 94%    BMI 26.1 kg/m2         Intake/Output Summary (Last 24 hours) at 02/04/18 1017  Last data filed at 02/03/18 1407   Gross per 24 hour   Intake                0 ml   Output              450 ml   Net             -450 ml        Physical Examination:             Constitutional:  No acute distress, cooperative, pleasant    ENT:  Oral mucous moist, oropharynx benign. Neck supple,    Resp:  Decrease bronchial breath sound bilaterally. No wheezing/rhonchi/rales. No accessory muscle use   CV:  Regular rhythm, normal rate, no murmurs, gallops, rubs    GI:  Soft, non distended, non tender.  normoactive bowel sounds, no hepatosplenomegaly     Musculoskeletal:  No edema    Neurologic:  Conscious and alert, oriented to place and person, moves all extremities     Skin:  left heel and sacral area pressure ulcer       Data Review:    Review and/or order of clinical lab test      Labs:     Recent Labs      02/04/18   0328  02/03/18   9457 WBC  6.6  8.5   HGB  8.5*  8.1*   HCT  26.7*  25.0*   PLT  279  261     Recent Labs      02/04/18 0328 02/03/18   0405  02/02/18   0452   NA  146*  144  144   K  4.1  3.8  3.0*   CL  115*  114*  112*   CO2  23  23  23   BUN  34*  45*  56*   CREA  1.59*  1.70*  1.98*   GLU  78  85  80   CA  7.5*  7.4*  7.1*   MG   --   2.2  1.7   PHOS   --    --   3.6     Recent Labs      02/04/18 0328 02/03/18   0405  02/02/18   0452   SGOT  28  33  56*   ALT  48  57  73   AP  309*  343*  384*   TBILI  0.9  0.9  0.8   TP  5.4*  5.3*  5.1*   ALB  1.7*  1.7*  1.6*   GLOB  3.7  3.6  3.5     No results for input(s): INR, PTP, APTT in the last 72 hours. No lab exists for component: INREXT, INREXT   No results for input(s): FE, TIBC, PSAT, FERR in the last 72 hours. Lab Results   Component Value Date/Time    Folate 8.4 01/23/2015 07:19 PM      No results for input(s): PH, PCO2, PO2 in the last 72 hours. No results for input(s): CPK, CKNDX, TROIQ in the last 72 hours.     No lab exists for component: CPKMB  No results found for: CHOL, CHOLX, CHLST, CHOLV, HDL, LDL, LDLC, DLDLP, TGLX, TRIGL, TRIGP, CHHD, CHHDX  Lab Results   Component Value Date/Time    Glucose (POC) 118 01/27/2015 12:15 PM    Glucose (POC) 117 01/27/2015 07:26 AM    Glucose (POC) 168 01/26/2015 09:03 PM    Glucose (POC) 146 01/26/2015 04:26 PM    Glucose (POC) 144 01/26/2015 11:27 AM     Lab Results   Component Value Date/Time    Color DARK YELLOW 01/31/2018 08:48 PM    Appearance TURBID 01/31/2018 08:48 PM    Specific gravity 1.015 01/31/2018 08:48 PM    pH (UA) 5.5 01/31/2018 08:48 PM    Protein 30 01/31/2018 08:48 PM    Glucose NEGATIVE  01/31/2018 08:48 PM    Ketone NEGATIVE  01/31/2018 08:48 PM    Bilirubin NEGATIVE  01/31/2018 08:48 PM    Urobilinogen 1.0 01/31/2018 08:48 PM    Nitrites NEGATIVE  01/31/2018 08:48 PM    Leukocyte Esterase LARGE 01/31/2018 08:48 PM    Epithelial cells FEW 01/31/2018 08:48 PM    Bacteria NEGATIVE  01/31/2018 08:48 PM    WBC >100 01/31/2018 08:48 PM    RBC 0-5 01/31/2018 08:48 PM         Medications Reviewed:     Current Facility-Administered Medications   Medication Dose Route Frequency    famotidine (PEPCID) tablet 20 mg  20 mg Oral DAILY    balsam peru-castor oil (VENELEX)  mg/gram ointment   Topical BID    piperacillin-tazobactam (ZOSYN) 10.125 g in  mL continuous 24 hr infusion  10.125 g IntraVENous Q24H    sodium chloride (NS) flush 5-10 mL  5-10 mL IntraVENous Q8H    sodium chloride (NS) flush 5-10 mL  5-10 mL IntraVENous PRN    sodium chloride (NS) flush 10-30 mL  10-30 mL InterCATHeter PRN    sodium chloride (NS) flush 10 mL  10 mL InterCATHeter Q24H    sodium chloride (NS) flush 10 mL  10 mL InterCATHeter PRN    sodium chloride (NS) flush 10-40 mL  10-40 mL InterCATHeter Q8H    sodium chloride (NS) flush 20 mL  20 mL InterCATHeter Q24H    alteplase (CATHFLO) 1 mg in sterile water (preservative free) 1 mL injection  1 mg InterCATHeter PRN    heparin (porcine) injection 5,000 Units  5,000 Units SubCUTAneous Q12H    sodium chloride (NS) flush 5-10 mL  5-10 mL IntraVENous PRN    0.9% sodium chloride infusion  50 mL/hr IntraVENous CONTINUOUS     ______________________________________________________________________  EXPECTED LENGTH OF STAY: 4d 21h  ACTUAL LENGTH OF STAY:          4                 Jas Bacon MD

## 2018-02-04 NOTE — PROGRESS NOTES
Famotidine - Renal dosing by Pharmacy  Current regimen:  20 mg PO Q 12 hr  Recent Labs      02/04/18   0328  02/03/18   0405  02/02/18   0452   CREA  1.59*  1.70*  1.98*   BUN  34*  45*  56*   Estimated CrCl:  34.1 ml/min    Change to 20 mg Q 24hrs for CrCl <50 mL/min    Famotidine (PO/IV) Renal Dosing:      >50 mL/min - 20 mg Q 12hrs      <50 mL/min - 20 mg Q 24hrs                    HD - 20 mg Q 24hrs               CRRT - n/a

## 2018-02-05 LAB
ALBUMIN SERPL-MCNC: 1.6 G/DL (ref 3.5–5)
ALBUMIN/GLOB SERPL: 0.5 {RATIO} (ref 1.1–2.2)
ALP SERPL-CCNC: 251 U/L (ref 45–117)
ALT SERPL-CCNC: 37 U/L (ref 12–78)
ANION GAP SERPL CALC-SCNC: 5 MMOL/L (ref 5–15)
AST SERPL-CCNC: 22 U/L (ref 15–37)
BILIRUB SERPL-MCNC: 0.6 MG/DL (ref 0.2–1)
BUN SERPL-MCNC: 29 MG/DL (ref 6–20)
BUN/CREAT SERPL: 18 (ref 12–20)
CALCIUM SERPL-MCNC: 7.5 MG/DL (ref 8.5–10.1)
CHLORIDE SERPL-SCNC: 116 MMOL/L (ref 97–108)
CO2 SERPL-SCNC: 24 MMOL/L (ref 21–32)
CREAT SERPL-MCNC: 1.61 MG/DL (ref 0.7–1.3)
GLOBULIN SER CALC-MCNC: 3.3 G/DL (ref 2–4)
GLUCOSE SERPL-MCNC: 88 MG/DL (ref 65–100)
POTASSIUM SERPL-SCNC: 4 MMOL/L (ref 3.5–5.1)
PROT SERPL-MCNC: 4.9 G/DL (ref 6.4–8.2)
SODIUM SERPL-SCNC: 145 MMOL/L (ref 136–145)

## 2018-02-05 PROCEDURE — C1758 CATHETER, URETERAL: HCPCS

## 2018-02-05 PROCEDURE — 97535 SELF CARE MNGMENT TRAINING: CPT

## 2018-02-05 PROCEDURE — 97162 PT EVAL MOD COMPLEX 30 MIN: CPT

## 2018-02-05 PROCEDURE — 65270000029 HC RM PRIVATE

## 2018-02-05 PROCEDURE — G8978 MOBILITY CURRENT STATUS: HCPCS

## 2018-02-05 PROCEDURE — 97165 OT EVAL LOW COMPLEX 30 MIN: CPT

## 2018-02-05 PROCEDURE — G8979 MOBILITY GOAL STATUS: HCPCS

## 2018-02-05 PROCEDURE — 97530 THERAPEUTIC ACTIVITIES: CPT

## 2018-02-05 PROCEDURE — 74011250636 HC RX REV CODE- 250/636: Performed by: HOSPITALIST

## 2018-02-05 PROCEDURE — 74011250636 HC RX REV CODE- 250/636: Performed by: INTERNAL MEDICINE

## 2018-02-05 PROCEDURE — 74011250637 HC RX REV CODE- 250/637: Performed by: HOSPITALIST

## 2018-02-05 PROCEDURE — 36415 COLL VENOUS BLD VENIPUNCTURE: CPT | Performed by: HOSPITALIST

## 2018-02-05 PROCEDURE — 80053 COMPREHEN METABOLIC PANEL: CPT | Performed by: HOSPITALIST

## 2018-02-05 RX ADMIN — HEPARIN SODIUM 5000 UNITS: 5000 INJECTION, SOLUTION INTRAVENOUS; SUBCUTANEOUS at 09:31

## 2018-02-05 RX ADMIN — Medication 10 ML: at 07:01

## 2018-02-05 RX ADMIN — Medication 10 ML: at 15:23

## 2018-02-05 RX ADMIN — HEPARIN SODIUM 5000 UNITS: 5000 INJECTION, SOLUTION INTRAVENOUS; SUBCUTANEOUS at 22:50

## 2018-02-05 RX ADMIN — PIPERACILLIN AND TAZOBACTAM 10.12 G: 3; .375 INJECTION, POWDER, FOR SOLUTION INTRAVENOUS at 13:10

## 2018-02-05 RX ADMIN — CASTOR OIL AND BALSAM, PERU: 788; 87 OINTMENT TOPICAL at 18:09

## 2018-02-05 RX ADMIN — Medication 10 ML: at 22:50

## 2018-02-05 RX ADMIN — CASTOR OIL AND BALSAM, PERU: 788; 87 OINTMENT TOPICAL at 09:31

## 2018-02-05 RX ADMIN — FAMOTIDINE 20 MG: 20 TABLET, FILM COATED ORAL at 09:31

## 2018-02-05 NOTE — PROGRESS NOTES
NUTRITION COMPLETE ASSESSMENT    RECOMMENDATIONS:   1. Continue current diet, Supplements (specify)  2. Encourage increased po intake meals/supplements; please document in flow sheets  3. Consider adding daily MVI  4. Weekly weights     Interventions/Plan:   Food/Nutrient Delivery:           RD to add supplements    Assessment:   Reason for Assessment:   [] Provider Consult  [x]BPA/MST Referral -  Pressure ulcer    Diet: Regular (2 gm Na)  Supplements: None  Nutritionally Significant Medications: [x] Reviewed & Includes: Pepcid, NS @50mL/hr  Meal Intake: Patient Vitals for the past 100 hrs:   % Diet Eaten   02/02/18 1000 75 %       Pre-Hospitalization: Unsure       Current Hospitalization:   Fluid Restriction:  none  Appetite: Fair to good  PO Ability: Independent Average po intake:50-75%  Average supplements intake:  n/a      Subjective:  \"I am getting plenty of food. The food is good. \"    Objective:  Pt seen for MST-pressure ulcer. Pt admitted with SIRS from assisted living. PMHx: arthritis, arrhythmia, constipation, hearing loss, hypertension, hyperlipidemia, joint pain, anemia, memory loss. Reviewed chart, spoke with pt. Pt appears to be eating well per RN and pt, although little po documentation since admission. Discussed increased protein needs due to Stage II wound on L hip and buttocks; pt agreeable to trial of Ensure (vanilla). Ensure BID provides 700 kcal, 40g protein meeting 41% caloric, 44% protein needs if 100% consumed. RD to follow po intake, supplement acceptance, wt status. Estimated Nutrition Needs:   Kcals/day: 0908 Kcals/day (1651-7777 kcal/day (MSJ x 1.2-1.3))  Protein: 90 g (1.2g/kg)  Fluid: 1800 ml (1mL/kcal)  Based On: [place] [employer]  Weight Used: Actual wt    Pt expected to meet estimated nutrient needs:  []   Yes     []  No [x] Unable to predict at this time    Nutrition Diagnosis:   1.  Increased nutrient needs (protein) related to pressure injury as evidenced by pt with stage II pressure injury, L hip/buttocks    Goals:       Pt will consume at least 50% meals, supplements over next 5-7 days     Monitoring & Evaluation:    - Total energy intake, Liquid meal replacement   - Weight/weight change   -      Previous Nutrition Goals Met:   N/A  Previous Recommendations:    N/A    Education & Discharge Needs:   [x] None Identified   [] Identified and addressed    [] Participated in care plan, discharge planning, and/or interdisciplinary rounds        Cultural, Scientologist and ethnic food preferences identified: None    Skin Integrity: []Intact  [x]Other- stage II pressure injury- buttocks, L hip  Edema: [x]None []Other  Last BM: 2/4/2018  Food Allergies: [x]None []Other  Diet Restrictions: Cultural/[place]ah[place] Preference(s): None     Anthropometrics:    Weight Loss Metrics 2/5/2018 7/22/2017 4/7/2017 11/7/2016 2/1/2015 1/23/2015 2/11/2014   Today's Wt 165 lb 5.5 oz 177 lb 6.4 oz 149 lb 14.6 oz 153 lb 153 lb 1 oz 155 lb 175 lb   BMI 25.9 kg/m2 27.78 kg/m2 23.48 kg/m2 23.26 kg/m2 23.28 kg/m2 23.57 kg/m2 28.26 kg/m2      Weight Source: Bed  Height: 5' 7\" (170.2 cm),    Body mass index is 25.9 kg/(m^2).    ,     ,      Labs:  Lab Results   Component Value Date/Time    Sodium 145 02/05/2018 04:13 AM    Potassium 4.0 02/05/2018 04:13 AM    Chloride 116 02/05/2018 04:13 AM    CO2 24 02/05/2018 04:13 AM    Glucose 88 02/05/2018 04:13 AM    BUN 29 02/05/2018 04:13 AM    Creatinine 1.61 02/05/2018 04:13 AM    Calcium 7.5 02/05/2018 04:13 AM    Magnesium 2.2 02/03/2018 04:05 AM    Phosphorus 3.6 02/02/2018 04:52 AM    Albumin 1.6 02/05/2018 04:13 AM     Lab Results   Component Value Date/Time    Hemoglobin A1c 5.6 01/23/2015 07:19 PM         Madiha Hinds RD

## 2018-02-05 NOTE — ROUTINE PROCESS
Bedside shift change report given to Jan (oncoming nurse) by Yuko Browne (offgoing nurse). Report included the following information SBAR.

## 2018-02-05 NOTE — PROGRESS NOTES
Problem: Self Care Deficits Care Plan (Adult)  Goal: *Acute Goals and Plan of Care (Insert Text)  Occupational Therapy Goals  Initiated 2/5/2018  1. Patient will perform grooming while sitting EOB with supervision/set-up within 7 day(s). 2.  Patient will perform UB dressing with supervision/set-up within 7 day(s). 3.  Patient will perform toilet transfers with moderate assistance  within 7 day(s). 4.  Patient will participate in AROM, gentle upper extremity therapeutic exercise/activities with supervision/set-up for 6 minutes within 7 day(s). Occupational Therapy EVALUATION  Patient: Rahul Orozco (50 y.o. male)  Date: 2/5/2018  Primary Diagnosis: SIRS (systemic inflammatory response syndrome) (HCC)  Shock (HCC)  Hypotension  Leukocytosis  Abnormal urinalysis  Dehydration        Precautions:Fall    ASSESSMENT :  Based on the objective data described below, the patient presents with impaired activity tolerance, functional mobility, and standing balance necessary in participating in ADL tasks with admission for SIRS with need for pressor support with initial admission. Hx of dementia. Nursing cleared for therapy. He is oriented x 4(possibly use of white board) however with poor recall of name of his [facility]/LTC, unclear if [facility] or [facility](his [facility]). He reports he lives with his wife and they have an aide come in the mornings to assist with ADLs. He sleeps in recliner lift chair and transfers to power wc with x 1 person assist.  R shoulder with impaired flexion. He has not been out of bed since admission. Condom cath off in bed and patient incontinent of bowel and bladder. Today he needed extensive assistance with bed mobility, CGA-SBA for sitting balance, and max A x2 for attempt to transfer. Multiple rolling in both directions for toileting hygiene and changing sheets, initially with max A and decreased need to min A.   Returned to bed and left in chair as it is unsafe at this time. Recommend use of Terris Gamaliel for transfers to chair. Recommend bed in chair position or use of Naomi to chair for all meals. Patient is requiring additional assistance to complete basic functional mobility and participation in ADL tasks at this time. Depending on the level of care available at his facility he may need SNF for rehab prior to returning to [facility]. Patient will benefit from skilled intervention to address the above impairments. Patients rehabilitation potential is considered to be Guarded  Factors which may influence rehabilitation potential include:   []             None noted  []             Mental ability/status  []             Medical condition  []             Home/family situation and support systems  []             Safety awareness  []             Pain tolerance/management  []             Other:      PLAN :  Recommendations and Planned Interventions:  []               Self Care Training                  []        Therapeutic Activities  []               Functional Mobility Training    []        Cognitive Retraining  []               Therapeutic Exercises           []        Endurance Activities  []               Balance Training                   []        Neuromuscular Re-Education  []               Visual/Perceptual Training     []   Home Safety Training  []               Patient Education                 []        Family Training/Education  []               Other (comment):    Frequency/Duration: Patient will be followed by occupational therapy 3 times a week to address goals. Discharge Recommendations: [facility]  Further Equipment Recommendations for Discharge: TBD SNF     SUBJECTIVE:   Patient stated I usually wear a diaper.   When asked about incontinence     OBJECTIVE DATA SUMMARY:   HISTORY:   Past Medical History:   Diagnosis Date    Arrhythmia     RAPID HEARTBEAT    Arthritis     Constipation     Hearing loss     Heart disease     Hypercholesteremia     Hypertension     Joint pain     Memory disorder      Past Surgical History:   Procedure Laterality Date    HX BACK SURGERY  1974    HX CATARACT REMOVAL      LEFT    HX COLONOSCOPY      HX HERNIA REPAIR  1975    double    HX TONSILLECTOMY      UPPER GI ENDOSCOPY,BIOPSY  3/22/2013            Prior Level of Function/Environment/Context: Unclear what facility halfway/LTC? He reports living with his wife who amb at [address] [place] [address]. They have a private aide come in AM during week and daughter on weekends to complete ADL tasks. Patient overall mod-max A for dressing, bathing and toileting. Sleeps in recliner lift chair. Transfers from chair to power wc with x 1 person assist.   Occupations in which the patient is/was successful, what are the barriers preventing that success:   Performance Patterns (routines, roles, habits, and rituals):   Personal Interests and/or values:   Expanded or extensive additional review of patient history: dementia  Home Situation  [facility] Name: [facility]  # Steps to Enter: 0  One/Two Story Residence: One story  Living Alone: No  Support Systems: Family member(s), Spouse/Significant Other/Partner, Assisted living  Patient Expects to be Discharged to[de-identified] Assisted living  Current DME Used/Available at Home: Lift chair, Wheelchair, power  Tub or Shower Type: Shower  [x]  Right hand dominant   []  Left hand dominant    EXAMINATION OF PERFORMANCE DEFICITS:  Cognitive/Behavioral Status:  Neurologic State: Alert  Orientation Level: Oriented X4  Cognition: Appropriate for age attention/concentration; Follows commands;Memory loss        Safety/Judgement: Decreased awareness of need for safety;Decreased awareness of need for assistance;Decreased insight into deficits    Skin: uppers R brusing on posterior of arm- possibly hx of infiltrated IV  See chart for full skin assessment    Edema: BUE none    Hearing:   Auditory  Auditory Impairment: Hard of hearing, bilateral    Vision/Perceptual:                           Acuity:  (not fully asses)         Range of Motion:  R shoulder limited overhead flexion- impaired rotator cuff  LUE: mild impairment, functional                            Strength:  BUE mild impairment, function                   Coordination:     Fine Motor Skills-Upper: Left Intact; Right Intact    Gross Motor Skills-Upper: Left Impaired;Right Impaired (R shoulder impaired AROM, limiting coordination)    Tone & Sensation:  Tone: increase stiffness at beginning of session  Sensation: BUE WDL                            Balance:  Sitting: Impaired  Sitting - Static: Good (unsupported)  Sitting - Dynamic: Poor (constant support)  Standing: Impaired  Standing - Static: Poor    Functional Mobility and Transfers for ADLs:  Bed Mobility:  Rolling: Maximum assistance; Additional time;Assist x2  Supine to Sit: Total assistance;Assist x2  Sit to Supine: Total assistance;Assist x1  Scooting: Total assistance    Transfers:  Sit to Stand: Total assistance;Assist x2  Stand to Sit: Total assistance;Assist x2    ADL Assessment:  Feeding: Setup;Supervision    Oral Facial Hygiene/Grooming: Setup;Supervision (supported sitting)    Bathing: Maximum assistance    Upper Body Dressing: Minimum assistance    Lower Body Dressing: Total assistance    Toileting: Total assistance (incontinent)                ADL Intervention and task modifications:       Provided verbal cues for sequencing, safety, and body mechanics with bed mob, sitting balance, toileting hygiene at bed level and attempts for self care transfer.   Left in bed in chair position  Cognitive Retraining  Safety/Judgement: Decreased awareness of need for safety;Decreased awareness of need for assistance;Decreased insight into deficits      Functional Measure:  Barthel Index:    Bathin  Bladder: 0  Bowels: 0  Groomin  Dressin  Feedin  Mobility: 0  Stairs: 0  Toilet Use: 0  Transfer (Bed to Chair and Back): 5  Total: 15       Barthel and G-code impairment scale:  Percentage of impairment CH  0% CI  1-19% CJ  20-39% CK  40-59% CL  60-79% CM  80-99% CN  100%   Barthel Score 0-100 100 99-80 79-60 59-40 20-39 1-19   0   Barthel Score 0-20 20 17-19 13-16 9-12 5-8 1-4 0      The Barthel ADL Index: Guidelines  1. The index should be used as a record of what a patient does, not as a record of what a patient could do. 2. The main aim is to establish degree of independence from any help, physical or verbal, however minor and for whatever reason. 3. The need for supervision renders the patient not independent. 4. A patient's performance should be established using the best available evidence. Asking the patient, friends/relatives and nurses are the usual sources, but direct observation and common sense are also important. However direct testing is not needed. 5. Usually the patient's performance over the preceding 24-48 hours is important, but occasionally longer periods will be relevant. 6. Middle categories imply that the patient supplies over 50 per cent of the effort. 7. Use of aids to be independent is allowed. [place] Lind[place]., Barthel, D.W. (2973). Functional evaluation: the Barthel Index. [facility] (14)2. MARANDA Falcon, Belinda Blanc., Kim Quintero.[facility], [address] [place] [address] (1999). Measuring the change indisability after inpatient rehabilitation; comparison of the responsiveness of the Barthel Index and Functional [place] Measure. Journal of Neurology, Neurosurgery, and Psychiatry, 66(4), 014-245. John Bower, N.J.A, CIPRIANO Dougherty, & Ray Swan MEleA. (2004.) Assessment of post-stroke quality of life in cost-effectiveness studies: The usefulness of the Barthel Index and the EuroQoL-5D. Quality of Life Research, 13, 815-56       G codes:   In compliance with CMSs Claims Based Outcome Reporting, the following G-code set was chosen for this patient based on their primary functional limitation being treated: The outcome measure chosen to determine the severity of the functional limitation was the Barthel Index with a score of 15/100 which was correlated with the impairment scale. ? Self Care:     - CURRENT STATUS: CM - 80%-99% impaired, limited or restricted    - GOAL STATUS: CL - 60%-79% impaired, limited or restricted    - D/C STATUS:  ---------------To be determined---------------     Occupational Therapy Evaluation Charge Determination   History Examination Decision-Making   MEDIUM Complexity : Expanded review of history including physical, cognitive and psychosocial  history  MEDIUM Complexity : 3-5 performance deficits relating to physical, cognitive , or psychosocial skils that result in activity limitations and / or participation restrictions MEDIUM Complexity : Patient may present with comorbidities that affect occupational performnce. Miniml to moderate modification of tasks or assistance (eg, physical or verbal ) with assesment(s) is necessary to enable patient to complete evaluation       Based on the above components, the patient evaluation is determined to be of the following complexity level: MEDIUM  Pain:  Pain Scale 1: Numeric (0 - 10)  Pain Intensity 1: 7  Pain Location 1: Leg     Pain Description 1: Aching  Pain Intervention(s) 1: Declines  Activity Tolerance:   BP stable supine and sitting. Reports initial dizziness with change in positions. Please refer to the flowsheet for vital signs taken during this treatment. After treatment:   [] Patient left in no apparent distress sitting up in chair  [x] Patient left in no apparent distress in bed in chair position  [x] Call bell left within reach  [x] Nursing notified  [] Caregiver present  [] Bed alarm activated    COMMUNICATION/EDUCATION:   The patients plan of care was discussed with: Physical Therapist, Registered Nurse and Patient.   [x] Home safety education was provided and the patient/caregiver indicated understanding. [x] Patient/family have participated as able in goal setting and plan of care. [x] Patient/family agree to work toward stated goals and plan of care. [] Patient understands intent and goals of therapy, but is neutral about his/her participation. [] Patient is unable to participate in goal setting and plan of care. This patients plan of care is appropriate for delegation to [facility].     Thank you for this referral.  Nikhil El, OT  Time Calculation: 39 mins

## 2018-02-05 NOTE — PROGRESS NOTES
Problem: Mobility Impaired (Adult and Pediatric)  Goal: *Acute Goals and Plan of Care (Insert Text)  Physical Therapy Goals  Initiated 2/5/2018  1. Patient will move from supine to sit and sit to supine  and scoot up and down in bed with moderate assistance  within 7 day(s). 2.  Patient will transfer from bed to chair and chair to bed with maximal assistance using the least restrictive device within 7 day(s). 3.  Patient will perform sit to stand with maximal assistance within 7 day(s). physical Therapy EVALUATION  Patient: Brianna Arias (28 y.o. male)  Date: 2/5/2018  Primary Diagnosis: SIRS (systemic inflammatory response syndrome) (HCC)  Shock (HCC)  Hypotension  Leukocytosis  Abnormal urinalysis  Dehydration        Precautions:   Fall    ASSESSMENT :  Based on the objective data described below, the patient presents with decreased activity tolerance due to pain in right hip with rolling and BLE while removing SCDs and donning socks, decreased ROM with stiff and rigid BLE with c/o pain with gentle ROM, deconditioned, pt preferred to stay in bed but agreeable to participate with therapy following education of mobility benefits, decreased sitting and standing balance and significantly impaired functional mobility from pt's stated baseline. Pt reports he resides in Assisted Living with his wife. They have assistance for a few hours in the morning and his daughter assist them on the weekend. Pt reports he is normally able to transfer to a power wheelchair though he reports he stays in his wheelchair most of the time. He also reports he sleeps in a recliner. Pt required maximum assist x 2 to transfer supine to sit, attempted sit to stand with total assist x 2 to barely clear his buttocks from the bed in a flexed posture of his trunk, hips, and knees. Pt was assisted to supine position and slid to [facility] with total assistance.  Pt was able to roll left and right to change his wet bed sheets with maximum assist x 2. Pt is currently at risk for falls as suggested by the Tinetti assessment. Recommend SNF as pt requires increased assistance for all mobility at this time. Patient will benefit from skilled intervention to address the above impairments. Patients rehabilitation potential is considered to be Fair  Factors which may influence rehabilitation potential include:   []         None noted  []         Mental ability/status  []         Medical condition  [x]         Home/family situation and support systems  []         Safety awareness  [x]         Pain tolerance/management  []         Other:      PLAN :  Recommendations and Planned Interventions:  [x]           Bed Mobility Training             []    Neuromuscular Re-Education  [x]           Transfer Training                   []    Orthotic/Prosthetic Training  [x]           Gait Training                         []    Modalities  [x]           Therapeutic Exercises           []    Edema Management/Control  [x]           Therapeutic Activities            [x]    Patient and Family Training/Education  []           Other (comment):    Frequency/Duration: Patient will be followed by physical therapy  5 times a week to address goals. Discharge Recommendations: [facility]  Further Equipment Recommendations for Discharge: to be determined      SUBJECTIVE:   Patient stated Oh that hurts. Be careful(moving legs).   Pt recommended that therapist talk to his daughter for information.      OBJECTIVE DATA SUMMARY:   HISTORY:    Past Medical History:   Diagnosis Date    Arrhythmia     RAPID HEARTBEAT    Arthritis     Constipation     Hearing loss     Heart disease     Hypercholesteremia     Hypertension     Joint pain     Memory disorder      Past Surgical History:   Procedure Laterality Date    HX BACK SURGERY  1974    HX CATARACT REMOVAL      LEFT    HX COLONOSCOPY      HX HERNIA REPAIR  1975    double    HX TONSILLECTOMY      UPPER GI ENDOSCOPY,BIOPSY  3/22/2013          Prior Level of Function/Home Situation:Pt reports he resides in [address] with his wife, pt and his wife have assistance for a few hours in the morning, his daughter assist them on the weekends, pt reports he stays in his power wheelchair most of the time, sleeps in a recliner, denies any falls in the last year   Personal factors and/or comorbidities impacting plan of care:     Home Situation  Home Environment: [facility] [address] Name: [facility] [place]  # Steps to Enter: 0  One/Two Story Residence: One story  Living Alone: No  Support Systems: Family member(s), Spouse/Significant Other/Partner, Assisted living  Patient Expects to be Discharged to[de-identified] Assisted living  Current DME Used/Available at Home: Wheelchair, power    EXAMINATION/PRESENTATION/DECISION MAKING:   Critical Behavior:  Neurologic State: Alert, Appropriate for age, RN reports pt has been sleeping a lot this morning   Orientation Level: Oriented X4, pt could not recall the name of his BR[facility]  Cognition: Appropriate for age attention/concentration, Follows commands, Memory loss  Safety/Judgement: Decreased awareness of need for safety, Decreased awareness of need for assistance, Decreased insight into deficits  Hearing: Auditory  Auditory Impairment: Hard of hearing, bilateral  Skin:  Frail skin, pressure ulcer left heel and sacral area   Edema: right arm due to possible IV infiltrate   Range Of Motion:   decreased throughout, BLE tight and rigid, pt c/o pain with gentle ROM, multiple complaints of right knee pain with gentle ROM, right shoulder with chronic rotator cuff injury(?) limiting ROM, LUE generally decreased, functional                        Strength:     decreased strength throughout                    Tone & Sensation:    intact                              Coordination:   decreased        Functional Mobility:  Bed Mobility:  Rolling: Maximum assistance; Additional time;Assist x2  Supine to Sit: maximum assistance;Assist x2  Sit to Supine: Total assistance;Assist x1  Scooting: Total assistance  Transfers:  Sit to Stand: Total assistance;Assist x2, attempted standing, barely cleared buttocks from bed with pt in flexed position of trunk, hips, and knees  Stand to Sit: Total assistance;Assist x2                       Balance:   Sitting: Impaired  Sitting - Static: Good (unsupported)  Sitting - Dynamic: Poor (constant support)  Standing: Impaired  Standing - Static: Poor      Functional Measure:  Tinetti test:    Sitting Balance: 1  Arises: 0  Attempts to Rise: 0  Immediate Standing Balance: 0  Standing Balance: 0  Nudged: 0  Eyes Closed: 0  Turn 360 Degrees - Continuous/Discontinuous: 0  Turn 360 Degrees - Steady/Unsteady: 0  Sitting Down: 1  Balance Score: 2  Indication of Gait: 0  R Step Length/Height: 0  L Step Length/Height: 0  R Foot Clearance: 0  L Foot Clearance: 0  Step Symmetry: 0  Step Continuity: 0  Path: 0  Trunk: 0  Walking Time: 0  Gait Score: 0  Total Score: 2       Tinetti Test and G-code impairment scale:  Percentage of Impairment CH    0%   CI    1-19% CJ    20-39% CK    40-59% CL    60-79% CM    80-99% CN     100%   Tinetti  Score 0-28 28 23-27 17-22 12-16 6-11 1-5 0       Tinetti Tool Score Risk of Falls  <19 = High Fall Risk  19-24 = Moderate Fall Risk  25-28 = Low Fall Risk  Tinetti ME. Performance-Oriented Assessment of Mobility Problems in Elderly Patients. [facility] 66; [id number]. (Scoring Description: PT Bulletin Feb. 10, 1993)    Older adults: Eladia Foster et al, 2009; n = 1000 [facility] elderly evaluated with ABC, GERRY, ADL, and IADL)  · Mean GERRY score for males aged 69-68 years = 26.21(3.40)  · Mean GERRY score for females age 69-68 years = 25.16(4.30)  · Mean GERRY score for males over 80 years = 23.29(6.02)  · Mean GERRY score for females over 80 years = 17.20(8.32)       G codes:   In compliance with CMSs Claims Based Outcome Reporting, the following G-code set was chosen for this patient based on their primary functional limitation being treated: The outcome measure chosen to determine the severity of the functional limitation was the Tinetti with a score of 2/28 which was correlated with the impairment scale. ? Mobility - Walking and Moving Around:     - CURRENT STATUS: CM - 80%-99% impaired, limited or restricted    - GOAL STATUS: CL - 60%-79% impaired, limited or restricted    - D/C STATUS:  ---------------To be determined---------------      Physical Therapy Evaluation Charge Determination   History Examination Presentation Decision-Making   HIGH Complexity :3+ comorbidities / personal factors will impact the outcome/ POC  HIGH Complexity : 4+ Standardized tests and measures addressing body structure, function, activity limitation and / or participation in recreation  MEDIUM Complexity : Evolving with changing characteristics  MEDIUM Complexity : FOTO score of 26-74      Based on the above components, the patient evaluation is determined to be of the following complexity level: MEDIUM    Pain:  Pain Scale 1: Numeric (0 - 10)  Pain Intensity 1: 7  Pain Location 1: Legs     Pain Description 1: Aching  Pain Intervention(s) 1: Declines  Activity Tolerance:   Poor, c/o painful right hip with rolling in bed and in BLE while removing SCDs, donning socks, and with gentle ROM, fatigues quickly with minimal activity    After treatment:   []         Patient left in no apparent distress sitting up in chair  [x]         Patient left in no apparent distress in bed, placed in semi-chair position   [x]         Call bell left within reach  [x]         Nursing notified  []         Caregiver present  []         Bed alarm activated    COMMUNICATION/EDUCATION:   The patients plan of care was discussed with: Registered Nurse. [x]         Fall prevention education was provided and the patient/caregiver indicated understanding.   []         Patient/family have participated as able in goal setting and plan of care. [x]         Patient/family agree to work toward stated goals and plan of care. []         Patient understands intent and goals of therapy, but is neutral about his/her participation. []         Patient is unable to participate in goal setting and plan of care.     Thank you for this referral.  Taylor Gay   Time Calculation: 40 mins

## 2018-02-05 NOTE — PROGRESS NOTES
Note recorded on 2.5.18: Spiritual Care Partner Volunteer visited patient in [address] [place] [address] on 2.3. 18. Documented by:    Rev. Dana M.Div, [facility]

## 2018-02-05 NOTE — CONSULTS
Infectious Disease Consult    Today's Date: 2/5/2018   Admit Date: 1/31/2018    Impression:   · Clostridial and E coli bacteremia--concerning for a gut source  · Pyuria with Pseudomonas from urine culture--on appropriate coverage    Plan:   · IV antibiotic therapy  · Consider further GI workup  · Will need course of IV antibiotic therapy for about 2 weeks, regardless    Anti-infectives:     Inpat Anti-Infectives     Start     Ordered Stop    02/02/18 1300  piperacillin-tazobactam (ZOSYN) 10.125 g in  mL continuous 24 hr infusion  10.125 g,   IntraVENous,   EVERY 24 HOURS      02/02/18 1210 --    01/31/18 2324  piperacillin-tazobactam (ZOSYN) 3.375 g in 0.9% sodium chloride (MBP/ADV) 100 mL  3.375 g,   IntraVENous,   ONCE      01/31/18 2324 02/01/18 1124          Subjective:   Date of Consultation:  February 5, 2018  Referring Physician: Dr Zoe Gurrola    Patient is a 81 y.o. male admitted with weakness and aches. He has been found to have pyuria with Pseudomonas from culture of the urine and also has Clostridia and E coli from blood cultures. He has no specific complaints at the time of my exam.      Patient Active Problem List   Diagnosis Code    Tremor R25.1    Weakness R53.1    Sinus tachycardia R00.0    ARF (acute renal failure) ([facility]) N17.9    Hypercholesteremia E78.00    Hypertension I10    Arthritis M19.90    Peripheral neuropathy G62.9    Renal failure N19    Falls W19. Ivan Sep    Rhabdomyolysis M62.82    Weight loss R63.4    Night sweats R61    Anorexia R63.0    Anemia D64.9    UTI (urinary tract infection) N39.0    CKD (chronic kidney disease) stage 3, GFR 30-59 ml/min N18.3    Chronic anemia D64.9    Debility R53.81    Status post hip surgery Z98.890    Chronic pain of right knee M25.561, G89.29    JAMES (acute kidney injury) ([facility] N17.9    Venous insufficiency of both lower extremities I87.2    Hyperlipidemia E78.5    Urinary retention R33.9    Busby catheter in place Z92.89    Dehydration E86.0    Leukocytosis D72.829    Shock ([address] R57.9    Hypotension I95.9    Abnormal urinalysis R82.90    SIRS (systemic inflammatory response syndrome) (HCC) R65.10     Past Medical History:   Diagnosis Date    Arrhythmia     RAPID HEARTBEAT    Arthritis     Constipation     Hearing loss     Heart disease     Hypercholesteremia     Hypertension     Joint pain     Memory disorder       Family History   Problem Relation Age of Onset    Stroke Mother     Heart Disease Father     Cancer Brother      lung    Other Brother      NON ALCOHOLIC CIRRHOSIS      Social History   Substance Use Topics    Smoking status: Former Smoker     Years: 16.00     Quit date: 1/23/1971    Smokeless tobacco: Not on file    Alcohol use No     Past Surgical History:   Procedure Laterality Date    HX BACK SURGERY  1974    HX CATARACT REMOVAL      LEFT    HX COLONOSCOPY      HX HERNIA REPAIR  1975    double    HX TONSILLECTOMY      UPPER GI ENDOSCOPY,BIOPSY  3/22/2013           Prior to Admission medications    Medication Sig Start Date End Date Taking? Authorizing Provider   furosemide (LASIX) 40 mg tablet Take 80 mg by mouth daily. Yes Historical Provider   furosemide (LASIX) 40 mg tablet Take 40 mg by mouth every evening. Yes Historical Provider   acetaminophen (TYLENOL) 500 mg tablet Take 500 mg by mouth two (2) times a day. Yes Historical Provider   acetaminophen (TYLENOL) 325 mg tablet Take 650 mg by mouth every eight (8) hours as needed for Pain. Yes Historical Provider   Menthol-Zinc Oxide (RISAMINE) 0.44-20.6 % oint Apply  to affected area two (2) times a day. Apply to buttocks. Yes Historical Provider   nystatin (MYCOSTATIN) topical cream Apply  to affected area three (3) times daily.  Apply to groin and sacrum every shift (7-3, 3-11, 11-7)   Yes Historical Provider   SENEXON-S 8.6-50 mg per tablet TAKE 1 TABLET BY MOUTH TWICE DAILY AT 9A AND 9P DX:CONSTIPATION *STOP LAXATIVES/STOOL SOFTNERS IF > 2BM IN 1 DAY  Patient taking differently: TAKE 1 TABLET BY MOUTH TWICE DAILY AT 9am AND 9p PRN 17  Yes Syl Lim NP   fluticasone (FLONASE) 50 mcg/actuation nasal spray 2 Sprays by Both Nostrils route daily. Yes Historical Provider   pantoprazole (PROTONIX) 40 mg tablet Take 1 tablet by mouth Before breakfast and dinner. 1/28/15  Yes Rand Lynch MD   metoprolol (LOPRESSOR) 25 mg tablet Take 12.5 mg by mouth two (2) times a day. Yes Historical Provider   rosuvastatin (CRESTOR) 20 mg tablet Take 20 mg by mouth nightly. Yes Historical Provider   Menthol-Zinc Oxide (RISAMINE) 0.44-20.6 % oint Apply  to affected area as needed (skin irritation). In addition to scheduled regimen. Indications: DIAPER RASH, SKIN IRRITATION    Historical Provider       Allergies   Allergen Reactions    Ciprofloxacin Unknown (comments)     Per [facility] paperwork.  Nsaids (Non-Steroidal Anti-Inflammatory Drug) Unknown (comments)     Per [facility] paperwork. Review of Systems:  A comprehensive review of systems was negative. Objective:     Visit Vitals    /68 (BP 1 Location: Left arm, BP Patient Position: At rest)    Pulse 72    Temp 97.8 °F (36.6 °C)    Resp 16    Ht 5' 7\" (1.702 m)    Wt 75 kg (165 lb 5.5 oz)    SpO2 97%    BMI 25.9 kg/m2     Temp (24hrs), Av.3 °F (36.8 °C), Min:97.8 °F (36.6 °C), Max:98.8 °F (37.1 °C)       Lines:  Central Venous Catheter:            Physical Exam:  General:  appears older than stated age, cachectic  Lungs:  clear to auscultation bilaterally  Heart:  regular rate and rhythm  Abdomen:  soft, non-tender.  Bowel sounds normal. No masses,  no organomegaly  Skin:  no rash or abnormalities    Data Review:     CBC:  Recent Labs      18   0328  18   0405   WBC  6.6  8.5   GRANS   --   72   MONOS   --   11   EOS   --   2   ANEU   --   6.3   ABL   --   0.8   HGB  8.5*  8.1*   HCT  26.7*  25.0*   PLT  279  261       BMP:  Recent Labs      02/05/18   0413  02/04/18   0328  02/03/18   0405   CREA  1.61*  1.59*  1.70*   BUN  29*  34*  45*   NA  145  146*  144   K  4.0  4.1  3.8   CL  116*  115*  114*   CO2  24  23  23   AGAP  5  8  7   GLU  88  78  85       LFTS:  Recent Labs      02/05/18 0413  02/04/18   0328  02/03/18   0405   TBILI  0.6  0.9  0.9   ALT  37  48  57   SGOT  22  28  33   AP  251*  309*  343*   TP  4.9*  5.4*  5.3*   ALB  1.6*  1.7*  1.7*       Microbiology:     All Micro Results     Procedure Component Value Units Date/Time    CULTURE, BLOOD, PAIRED [[id number]] Collected:  02/01/18 1621    Order Status:  Completed Specimen:  Blood Updated:  02/05/18 0508     Special Requests: NO SPECIAL REQUESTS        Culture result: NO GROWTH 4 DAYS       CULTURE, BLOOD [[id number]] Collected:  01/31/18 1928    Order Status:  Completed Specimen:  Blood from Blood Updated:  02/05/18 0508     Special Requests: NO SPECIAL REQUESTS        Culture result: NO GROWTH 5 DAYS       CULTURE, BLOOD [[id number]]  (Abnormal)  (Susceptibility) Collected:  01/31/18 2025    Order Status:  Completed Specimen:  Blood from Blood Updated:  02/03/18 0745     Special Requests: NO SPECIAL REQUESTS        Culture result:         CLOSTRIDIUM PERFRINGENS GROWING IN 1 OF 2 BOTTLES DRAWN (SITE= R AC) (A)            PRELIMINARY REPORT OF  GRAM POSITIVE RODS  GROWING IN 1 OF 2 BOTTLES DRAWN  CALLED TO AND READ BACK BY  ENIO PITTS RN AT 2111 2/1/18. LWY   (A)              ESCHERICHIA COLI ISOLATED FROM THE SAME 1 OF 2 BOTTLES. .. (A)              REMAINING BOTTLE(S) HAS/HAVE NO GROWTH SO FAR    CULTURE, URINE [[id number]]  (Abnormal)  (Susceptibility) Collected:  01/31/18 2048    Order Status:  Completed Specimen:  Urine from Cath Urine Updated:  02/02/18 1157     Special Requests: NO SPECIAL REQUESTS        [place] Count --        [id number]  COLONIES/mL       Culture result:         PSEUDOMONAS AERUGINOSA (A)    INFLUENZA A & B AG (RAPID TEST) [[id number]] Collected: 01/31/18 1928    Order Status:  Completed Specimen:  Nasopharyngeal from Nasal washing Updated:  01/31/18 2005     Influenza A Antigen NEGATIVE         Influenza B Antigen NEGATIVE              Imaging:   Results noted    Signed By: Bhavesh Hodges MD     February 5, 2018

## 2018-02-05 NOTE — PROGRESS NOTES
Problem: Falls - Risk of  Goal: *Absence of Falls  Document Rachel Fall Risk and appropriate interventions in the flowsheet.    Outcome: Progressing Towards Goal  Fall Risk Interventions:       Mentation Interventions: Adequate sleep, hydration, pain control, Bed/chair exit alarm, Door open when patient unattended    Medication Interventions: Evaluate medications/consider consulting pharmacy, Patient to call before getting OOB, Teach patient to arise slowly, Utilize gait belt for transfers/ambulation    Elimination Interventions: Call light in reach, Patient to call for help with toileting needs

## 2018-02-05 NOTE — PROGRESS NOTES
Hospitalist Progress Note  Anjum Vickers MD  Answering service: [phone] from in house phone  Cell: [phone]      Date of Service:  2018  NAME:  Antonette Hernandez  :  1935  MRN:  [id number]      Admission Summary: An 26-year-old white male with past medical history of arthritis, arrhythmia, constipation, hearing loss, hypertension, hyperlipidemia, joint pain, anemia, memory loss, who presented to the emergency department via EMS from assisted living facility with a chief complaint of generalized body aches. Interval history / Subjective:   He said he feels better but weaker than his base line, no chest pain or shortness of breath     Assessment & Plan:     Sepsis with septic shock unclear source POA  -patient with UTI, also blood cx on  grow clostridium perfringens , e coli unclear source ?  GI  -repeated blood cx on  no growth   -on zosyn and IVF, BP improved,   -off levophed, vancomycin discontinued  -BP normal  -ID on board  -consult GI     Pseudomonas Aeruginosa UTI POA  -continue zosyn started on ,   -urine cx grow pseudomonas aeruginosa [id number], sensitive to zosyn, resistant to floroquinolol  - blood cx no growth   closteridium perfringens, gram positive rods, possible contamination  -repeated blood cx  no growth so far  -leukocytosis improved  -on condom catheter    Bladder wall thickening on CT   -infection vs neoplasm vs due to bladder outlet obstruction  -no hematuria   -seen by urologist and recommendation for outpatient cystoscopy     Dehydration   -improved with IVF  -monitor I/O    Elevated transaminase possible due to sepsis  -improved, off IVF    Cholelithiasis on CT  -no RUQ tenderness     CKD stage III  -creatinine improving, on IVF  -monitor renal function and urine output     Anemia of chronic disease   -H/H is trending down, no evidence of active bleeding  -monitor H/H    Hypokalemia   -replaced and resolved    Pressure ulcer at left heel and sacral area  -consult wound care nurse     Debility  -he said he usually stay in recliner and uses wheelchair with help      Code status: Full Code  DVT prophylaxis: Heparin    Care Plan discussed with: Patient/Family, Nurse and   Disposition: From [place] [id number] Problems  Date Reviewed: 2/1/2018          Codes Class Noted POA    Dehydration ICD-10-CM: E86.0  ICD-9-CM: 276.51  1/31/2018 Unknown        Leukocytosis ICD-10-CM: D72.829  ICD-9-CM: 288.60  1/31/2018 Unknown        * (Principal)Shock ([facility] ICD-10-CM: R57.9  ICD-9-CM: 785.50  1/31/2018 Unknown        Hypotension ICD-10-CM: I95.9  ICD-9-CM: 458.9  1/31/2018 Unknown        Abnormal urinalysis ICD-10-CM: R82.90  ICD-9-CM: 791.9  1/31/2018 Unknown        SIRS (systemic inflammatory response syndrome) ([facility] ICD-10-CM: R65.10  ICD-9-CM: 995.90  1/31/2018 Unknown              Vital Signs:    Last 24hrs VS reviewed since prior progress note. Most recent are:  Visit Vitals    /79 (BP 1 Location: Right arm, BP Patient Position: At rest)    Pulse 83    Temp 98.2 °F (36.8 °C)    Resp 18    Ht 5' 7\" (1.702 m)    Wt 75 kg (165 lb 5.5 oz)    SpO2 97%    BMI 25.9 kg/m2       No intake or output data in the 24 hours ending 02/05/18 1045     Physical Examination:             Constitutional:  No acute distress, cooperative, pleasant    ENT:  Oral mucous moist, oropharynx benign. Neck supple,    Resp:  Decrease bronchial breath sound bilaterally. No wheezing/rhonchi/rales. No accessory muscle use   CV:  Regular rhythm, normal rate, no murmurs, gallops, rubs    GI:  Soft, non distended, non tender.  normoactive bowel sounds, no hepatosplenomegaly     Musculoskeletal:  No edema    Neurologic:  Conscious and alert, oriented to place and person, moves all extremities     Skin:  left heel and sacral area pressure ulcer       Data Review: Review and/or order of clinical lab test      Labs:     Recent Labs      02/04/18 0328 02/03/18   0405   WBC  6.6  8.5   HGB  8.5*  8.1*   HCT  26.7*  25.0*   PLT  279  261     Recent Labs      02/05/18 0413 02/04/18 0328 02/03/18   0405   NA  145  146*  144   K  4.0  4.1  3.8   CL  116*  115*  114*   CO2  24  23  23   BUN  29*  34*  45*   CREA  1.61*  1.59*  1.70*   GLU  88  78  85   CA  7.5*  7.5*  7.4*   MG   --    --   2.2     Recent Labs      02/05/18 0413 02/04/18 0328 02/03/18   0405   SGOT  22  28  33   ALT  37  48  57   AP  251*  309*  343*   TBILI  0.6  0.9  0.9   TP  4.9*  5.4*  5.3*   ALB  1.6*  1.7*  1.7*   GLOB  3.3  3.7  3.6     No results for input(s): INR, PTP, APTT in the last 72 hours. No lab exists for component: INREXT, INREXT   No results for input(s): FE, TIBC, PSAT, FERR in the last 72 hours. Lab Results   Component Value Date/Time    Folate 8.4 01/23/2015 07:19 PM      No results for input(s): PH, PCO2, PO2 in the last 72 hours. No results for input(s): CPK, CKNDX, TROIQ in the last 72 hours.     No lab exists for component: CPKMB  No results found for: CHOL, CHOLX, CHLST, CHOLV, HDL, LDL, LDLC, DLDLP, TGLX, TRIGL, TRIGP, CHHD, CHHDX  Lab Results   Component Value Date/Time    Glucose (POC) 118 01/27/2015 12:15 PM    Glucose (POC) 117 01/27/2015 07:26 AM    Glucose (POC) 168 01/26/2015 09:03 PM    Glucose (POC) 146 01/26/2015 04:26 PM    Glucose (POC) 144 01/26/2015 11:27 AM     Lab Results   Component Value Date/Time    Color DARK YELLOW 01/31/2018 08:48 PM    Appearance TURBID 01/31/2018 08:48 PM    Specific gravity 1.015 01/31/2018 08:48 PM    pH (UA) 5.5 01/31/2018 08:48 PM    Protein 30 01/31/2018 08:48 PM    Glucose NEGATIVE  01/31/2018 08:48 PM    Ketone NEGATIVE  01/31/2018 08:48 PM    Bilirubin NEGATIVE  01/31/2018 08:48 PM    Urobilinogen 1.0 01/31/2018 08:48 PM    Nitrites NEGATIVE  01/31/2018 08:48 PM    Leukocyte Esterase LARGE 01/31/2018 08:48 PM Epithelial cells FEW 01/31/2018 08:48 PM    Bacteria NEGATIVE  01/31/2018 08:48 PM    WBC >100 01/31/2018 08:48 PM    RBC 0-5 01/31/2018 08:48 PM         Medications Reviewed:     Current Facility-Administered Medications   Medication Dose Route Frequency    famotidine (PEPCID) tablet 20 mg  20 mg Oral DAILY    0.45% sodium chloride infusion  50 mL/hr IntraVENous CONTINUOUS    balsam peru-castor oil (VENELEX)  mg/gram ointment   Topical BID    piperacillin-tazobactam (ZOSYN) 10.125 g in  mL continuous 24 hr infusion  10.125 g IntraVENous Q24H    sodium chloride (NS) flush 5-10 mL  5-10 mL IntraVENous Q8H    sodium chloride (NS) flush 5-10 mL  5-10 mL IntraVENous PRN    sodium chloride (NS) flush 10-30 mL  10-30 mL InterCATHeter PRN    sodium chloride (NS) flush 10 mL  10 mL InterCATHeter Q24H    sodium chloride (NS) flush 10 mL  10 mL InterCATHeter PRN    sodium chloride (NS) flush 10-40 mL  10-40 mL InterCATHeter Q8H    sodium chloride (NS) flush 20 mL  20 mL InterCATHeter Q24H    alteplase (CATHFLO) 1 mg in sterile water (preservative free) 1 mL injection  1 mg InterCATHeter PRN    heparin (porcine) injection 5,000 Units  5,000 Units SubCUTAneous Q12H    sodium chloride (NS) flush 5-10 mL  5-10 mL IntraVENous PRN     ______________________________________________________________________  EXPECTED LENGTH OF STAY: 4d 21h  ACTUAL LENGTH OF STAY:          5                 Hilaria Wynn MD

## 2018-02-06 LAB
BACTERIA SPEC CULT: ABNORMAL
BACTERIA SPEC CULT: NORMAL
SERVICE CMNT-IMP: ABNORMAL
SERVICE CMNT-IMP: NORMAL

## 2018-02-06 PROCEDURE — 65270000029 HC RM PRIVATE

## 2018-02-06 PROCEDURE — 74011000258 HC RX REV CODE- 258: Performed by: HOSPITALIST

## 2018-02-06 PROCEDURE — 74011000250 HC RX REV CODE- 250: Performed by: HOSPITALIST

## 2018-02-06 PROCEDURE — 74011250636 HC RX REV CODE- 250/636: Performed by: INTERNAL MEDICINE

## 2018-02-06 PROCEDURE — 74011250636 HC RX REV CODE- 250/636: Performed by: HOSPITALIST

## 2018-02-06 PROCEDURE — 97530 THERAPEUTIC ACTIVITIES: CPT

## 2018-02-06 PROCEDURE — 97110 THERAPEUTIC EXERCISES: CPT

## 2018-02-06 PROCEDURE — 74011000258 HC RX REV CODE- 258: Performed by: INTERNAL MEDICINE

## 2018-02-06 PROCEDURE — 74011250637 HC RX REV CODE- 250/637: Performed by: HOSPITALIST

## 2018-02-06 PROCEDURE — C1758 CATHETER, URETERAL: HCPCS

## 2018-02-06 RX ORDER — METRONIDAZOLE 500 MG/100ML
500 INJECTION, SOLUTION INTRAVENOUS EVERY 8 HOURS
Status: DISCONTINUED | OUTPATIENT
Start: 2018-02-06 | End: 2018-02-06

## 2018-02-06 RX ADMIN — Medication 10 ML: at 21:56

## 2018-02-06 RX ADMIN — HEPARIN SODIUM 5000 UNITS: 5000 INJECTION, SOLUTION INTRAVENOUS; SUBCUTANEOUS at 21:55

## 2018-02-06 RX ADMIN — PIPERACILLIN AND TAZOBACTAM 10.12 G: 3; .375 INJECTION, POWDER, FOR SOLUTION INTRAVENOUS at 18:34

## 2018-02-06 RX ADMIN — CEFEPIME 2 G: 2 INJECTION, POWDER, FOR SOLUTION INTRAVENOUS at 13:38

## 2018-02-06 RX ADMIN — HEPARIN SODIUM 5000 UNITS: 5000 INJECTION, SOLUTION INTRAVENOUS; SUBCUTANEOUS at 10:22

## 2018-02-06 RX ADMIN — SODIUM CHLORIDE 50 ML/HR: 450 INJECTION, SOLUTION INTRAVENOUS at 08:24

## 2018-02-06 RX ADMIN — METRONIDAZOLE 500 MG: 500 INJECTION, SOLUTION INTRAVENOUS at 11:58

## 2018-02-06 RX ADMIN — Medication 10 ML: at 17:32

## 2018-02-06 RX ADMIN — PIPERACILLIN SODIUM,TAZOBACTAM SODIUM 3.38 G: 3; .375 INJECTION, POWDER, FOR SOLUTION INTRAVENOUS at 17:32

## 2018-02-06 RX ADMIN — FAMOTIDINE 20 MG: 20 TABLET, FILM COATED ORAL at 08:24

## 2018-02-06 RX ADMIN — CASTOR OIL AND BALSAM, PERU: 788; 87 OINTMENT TOPICAL at 18:39

## 2018-02-06 RX ADMIN — CASTOR OIL AND BALSAM, PERU: 788; 87 OINTMENT TOPICAL at 08:27

## 2018-02-06 RX ADMIN — Medication 10 ML: at 08:27

## 2018-02-06 NOTE — ROUTINE PROCESS
Bedside shift change report given to Erasmo Astudillo (oncoming nurse) by Leamon Primrose (offgoing nurse). Report included the following information SBAR, Kardex, ED Summary, Intake/Output and MAR.

## 2018-02-06 NOTE — CDMP QUERY
Query #2    Dear Hospitalists,    Based on the need for increased specificity in documentation for the new ICD 10 coding system that is coming, please include the following components in your documentation regarding:  SKIN ULCERS:    Specify the Type, Site, Stage and if POA or Hospital Acquired. Type: Pressure/decubitus, stasis, diabetic, neuropathic  Site: ankle, buttock, elbow, heel, hip, lower back, upper back  Stage: I, II, III, IV (unstageable, suspect deep tissue injury)  POA or Hospital Acquired    => Other explanation of clinical findings  => Unable to determine (no explanation for clinical findings)    The medical record reflects the following clinical findings, treatment, and risk factors. Risk Factors:  adm with sepsis and UTI  Clinical Indicators:  per WOCN\"1. Left trochanter, Deep tissue pressure injury Present on Admission:  3 x 1 x 0 cm; 80% non blanching red 20% non blanching purple. 2.  Right buttock, superficial denuded area: 1 x 0.2 x 0.1 cm; 100% red. 3.  Left buttock, superficial denuded area; 0.5 x 0.2 x 0.1 cm; 100% red. \"  Treatment: WOCN consult, wound care as ordered    Please clarify and document your clinical opinion in the progress notes and discharge summary including the definitive and/or presumptive diagnosis, (suspected or probable), related to the above clinical findings. Please include clinical findings supporting your diagnosis.     Thank You  Carol Rivas,MSN,BSN,RN,[facility]  [phone]

## 2018-02-06 NOTE — CONSULTS
[address]  [address] [facility], [address] [facility]                     Thank you for requesting this consultation but this patient has been seen by [facility] in the past.  We will inform them of this request.    Sincerely,  Kevon Bui NP  9:08 AM 2/6/2018

## 2018-02-06 NOTE — PROGRESS NOTES
Hospitalist Progress Note  Skyler Sánchez MD  Answering service: [phone] OR 36 from in house phone        Date of Service:  2018  NAME:  Fito Hernandez  :  1935  MRN:  [id number]      Admission Summary: An 43-year-old white male with past medical history of arthritis, arrhythmia, constipation, hearing loss, hypertension, hyperlipidemia, joint pain, anemia, memory loss, who presented to the emergency department via EMS from assisted living facility with a chief complaint of generalized body aches. Interval history / Subjective:   Mr. Bettejane Goldberg is feeling worn out. He denies abdominal pain, nausea, diarrhea. Assessment & Plan:     Sepsis with septic shock with E.coli and clostridial bacteremia (POA) - GI source?  - CT abdomen/pelvis with bladder wall thickening, cholelithiasis, RP adenopathy, degenerative arthritis  - Blood cx  with clostridium perfringens, e.coli, repeat  with gram positive rods. Repeat tomorrow   - Continue zosyn started , add flagyl .  Vancomycin discontinued  - off levophed  - Continue IVF  - ID consulted  - Consulted GI for consideration of colonoscopy to find source of persistent bacteremia    Pseudomonas Aeruginosa UTI (POA)  - Urine cx  with 50K CFU pseudomonas  - Continue zosyn started on     Bladder wall thickening on CT (POA) - infection vs neoplasm vs due to bladder outlet obstruction, no hematuria   - Urologist consulted and recommendation for outpatient cystoscopy     Dehydration (POA) - improved with IVF  - Monitor I/O    Elevated transaminase possible due to sepsis - improved    CKD stage 4 - creatinine improving, on IVF  - Continue to monitor renal function and urine output     Anemia of chronic disease   - H/H is trending down, no evidence of active bleeding  - monitor H/H    Hypokalemia - monitor and replace as needed    Pressure ulcer at left heel and sacral area  - Wound care consulted     Debility - he said he usually stay in recliner and uses wheelchair with help  - PT/OT    Code status: Full Code  DVT prophylaxis: Heparin    Care Plan discussed with: Patient/Family, Nurse and   Disposition: From [place] [address] Problems  Date Reviewed: 2/1/2018          Codes Class Noted POA    Dehydration ICD-10-CM: E86.0  ICD-9-CM: 276.51  1/31/2018 Unknown        Leukocytosis ICD-10-CM: D72.829  ICD-9-CM: 288.60  1/31/2018 Unknown        * (Principal)Shock ([facility] ICD-10-CM: R57.9  ICD-9-CM: 785.50  1/31/2018 Unknown        Hypotension ICD-10-CM: I95.9  ICD-9-CM: 458.9  1/31/2018 Unknown        Abnormal urinalysis ICD-10-CM: R82.90  ICD-9-CM: 791.9  1/31/2018 Unknown        SIRS (systemic inflammatory response syndrome) ([facility] [facility] ICD-10-CM: R65.10  ICD-9-CM: 995.90  1/31/2018 Unknown              Vital Signs:    Last 24hrs VS reviewed since prior progress note. Most recent are:  Visit Vitals    /58 (BP 1 Location: Right arm, BP Patient Position: At rest)    Pulse 79    Temp 98.3 °F (36.8 °C)    Resp 18    Ht 5' 7\" (1.702 m)    Wt 74 kg (163 lb 2.3 oz)    SpO2 95%    BMI 25.55 kg/m2         Intake/Output Summary (Last 24 hours) at 02/06/18 1042  Last data filed at 02/05/18 1811   Gross per 24 hour   Intake                0 ml   Output              300 ml   Net             -300 ml        Physical Examination:             Constitutional:  No acute distress, cooperative, pleasant    ENT:  Oral mucous moist, oropharynx benign. Neck supple,    Resp:  Decrease bronchial breath sound bilaterally. No wheezing/rhonchi/rales. No accessory muscle use   CV:  Regular rhythm, normal rate, no murmurs, gallops, rubs    GI:  Soft, non distended, non tender.  normoactive bowel sounds, no hepatosplenomegaly     Musculoskeletal:  No edema    Neurologic:  Conscious and alert, oriented to place and person, moves all extremities     Skin:  left heel and sacral area pressure ulcer       Data Review:     Telemetry    ECG    Xray x   CT scan x   MRI    Echocardiogram    Ultrasound              I have reviewed the flow sheet and recent notes  New labs and data personally reviewed. Labs:     Recent Labs      02/04/18 0328   WBC  6.6   HGB  8.5*   HCT  26.7*   PLT  279     Recent Labs      02/05/18 0413  02/04/18 0328   NA  145  146*   K  4.0  4.1   CL  116*  115*   CO2  24  23   BUN  29*  34*   CREA  1.61*  1.59*   GLU  88  78   CA  7.5*  7.5*     Recent Labs      02/05/18 0413 02/04/18 0328   SGOT  22  28   ALT  37  48   AP  251*  309*   TBILI  0.6  0.9   TP  4.9*  5.4*   ALB  1.6*  1.7*   GLOB  3.3  3.7     No results for input(s): INR, PTP, APTT in the last 72 hours. No lab exists for component: INREXT, INREXT   No results for input(s): FE, TIBC, PSAT, FERR in the last 72 hours. Lab Results   Component Value Date/Time    Folate 8.4 01/23/2015 07:19 PM      No results for input(s): PH, PCO2, PO2 in the last 72 hours. No results for input(s): CPK, CKNDX, TROIQ in the last 72 hours.     No lab exists for component: CPKMB  No results found for: CHOL, CHOLX, CHLST, CHOLV, HDL, LDL, LDLC, DLDLP, TGLX, TRIGL, TRIGP, CHHD, CHHDX  Lab Results   Component Value Date/Time    Glucose (POC) 118 01/27/2015 12:15 PM    Glucose (POC) 117 01/27/2015 07:26 AM    Glucose (POC) 168 01/26/2015 09:03 PM    Glucose (POC) 146 01/26/2015 04:26 PM    Glucose (POC) 144 01/26/2015 11:27 AM     Lab Results   Component Value Date/Time    Color DARK YELLOW 01/31/2018 08:48 PM    Appearance TURBID 01/31/2018 08:48 PM    Specific gravity 1.015 01/31/2018 08:48 PM    pH (UA) 5.5 01/31/2018 08:48 PM    Protein 30 01/31/2018 08:48 PM    Glucose NEGATIVE  01/31/2018 08:48 PM    Ketone NEGATIVE  01/31/2018 08:48 PM    Bilirubin NEGATIVE  01/31/2018 08:48 PM    Urobilinogen 1.0 01/31/2018 08:48 PM    Nitrites NEGATIVE  01/31/2018 08:48 PM    Leukocyte Esterase LARGE 01/31/2018 08:48 PM Epithelial cells FEW 01/31/2018 08:48 PM    Bacteria NEGATIVE  01/31/2018 08:48 PM    WBC >100 01/31/2018 08:48 PM    RBC 0-5 01/31/2018 08:48 PM         Medications Reviewed:     Current Facility-Administered Medications   Medication Dose Route Frequency    famotidine (PEPCID) tablet 20 mg  20 mg Oral DAILY    0.45% sodium chloride infusion  50 mL/hr IntraVENous CONTINUOUS    balsam peru-castor oil (VENELEX)  mg/gram ointment   Topical BID    piperacillin-tazobactam (ZOSYN) 10.125 g in  mL continuous 24 hr infusion  10.125 g IntraVENous Q24H    sodium chloride (NS) flush 5-10 mL  5-10 mL IntraVENous Q8H    sodium chloride (NS) flush 5-10 mL  5-10 mL IntraVENous PRN    sodium chloride (NS) flush 10-30 mL  10-30 mL InterCATHeter PRN    sodium chloride (NS) flush 10 mL  10 mL InterCATHeter Q24H    sodium chloride (NS) flush 10 mL  10 mL InterCATHeter PRN    sodium chloride (NS) flush 10-40 mL  10-40 mL InterCATHeter Q8H    sodium chloride (NS) flush 20 mL  20 mL InterCATHeter Q24H    alteplase (CATHFLO) 1 mg in sterile water (preservative free) 1 mL injection  1 mg InterCATHeter PRN    heparin (porcine) injection 5,000 Units  5,000 Units SubCUTAneous Q12H    sodium chloride (NS) flush 5-10 mL  5-10 mL IntraVENous PRN     ______________________________________________________________________  EXPECTED LENGTH OF STAY: 4d 21h  ACTUAL LENGTH OF STAY:          6                 Deonte Clay MD

## 2018-02-06 NOTE — CONSULTS
[address] [facility].   [address] [facility] [address]        GASTROENTEROLOGY CONSULTATION NOTE  Will Palmer Sacks  [phone] office  [phone] NP/PA in-hospital cell phone M-F until 4:30PM  After 5PM or on weekends, please call  for physician on call        NAME:  Janessa Saldaña   :   1935   MRN:   [id number]       Referring Physician: Dr. Calvin Mcdonald Date: 2018 10:59 AM    Chief Complaint: none     History of Present Illness:  Patient is a 81 y.o. who is seen in consultation at the request of Dr. Anders Lassiter for bacteremia with clostridium perfringens and ecoli unclear source. Patient has a past medical history of arrhythmia, hypertension, hyperlipidemia, anemia, dementia, hearing loss, constipation, and arthritis. He presented to the ED from an assisted living facility with complaints of generalized body aches. Patient was admitted to the hospital on 18 for shock and SIRS with likely sepsis. Patient was found to have urinary tract infection. Blood culture on 18 showed Clostridium perfringens and E. Coli. CT abdomen/pelvis without contrast on 18 showed significant bladder wall thickening, cholelithiasis, and minimal nonspecific retroperitoneal adenopathy. Repeat blood culture on 18 showed no growth. Patient is currently receiving Zosyn and Flagyl. Patient has no complaints of nausea, reflux, vomiting, or abdominal pain. He has had soft stools, although no watery diarrhea. Last bowel movement was this morning. No hematochezia or melena. He is receiving heparin SQ. No alcohol or tobacco use. Colonoscopy in  by Dr. Odette Mahan was normal.  A repeat colonoscopy was recommended in 5 years. EGD (3/22/13) by Dr. Feliciano Santos showed a normal esophagus, mixed type sliding hiatal hernia (nodular mucosal pattern in the pre-pyloric region without evidence of ulcers), and flattened duodenal folds.   Pathology showed nonspecific duodenitis with focal villous blunting and slightly increased intraepithelial lymphocytes. Pylorus biopsy showed gastric antral mucosa with chronic gastritis; negative for H. Pylori. Patient is unsure as to the date of his last colonoscopy. I have reviewed the emergency room note, hospital admission note, notes by all other clinicians who have seen the patient during this hospitalization to date. I have reviewed the problem list and the reason for this hospitalization. I have reviewed the allergies and the medications the patient was taking at home prior to this hospitalization. PMH:  Past Medical History:   Diagnosis Date    Arrhythmia     RAPID HEARTBEAT    Arthritis     Constipation     Hearing loss     Heart disease     Hypercholesteremia     Hypertension     Joint pain     Memory disorder        PSH:  Past Surgical History:   Procedure Laterality Date    HX BACK SURGERY  1974    HX CATARACT REMOVAL      LEFT    HX COLONOSCOPY      HX HERNIA REPAIR  1975    double    HX TONSILLECTOMY      UPPER GI ENDOSCOPY,BIOPSY  3/22/2013            Allergies: Allergies   Allergen Reactions    Ciprofloxacin Unknown (comments)     Per [employer] paperwork.  Nsaids (Non-Steroidal Anti-Inflammatory Drug) Unknown (comments)     Per [employer] paperwork. Home Medications:  Prior to Admission Medications   Prescriptions Last Dose Informant Patient Reported? Taking? Menthol-Zinc Oxide (RISAMINE) 0.44-20.6 % oint   Yes No   Sig: Apply  to affected area as needed (skin irritation). In addition to scheduled regimen. Indications: DIAPER RASH, SKIN IRRITATION   Menthol-Zinc Oxide (RISAMINE) 0.44-20.6 % oint 1/31/2018 at Unknown time  Yes Yes   Sig: Apply  to affected area two (2) times a day. Apply to buttocks.    SENEXON-S 8.6-50 mg per tablet 1/31/2018 at Unknown time  No Yes   Sig: TAKE 1 TABLET BY MOUTH TWICE DAILY AT 9A AND 9P DX:CONSTIPATION *STOP LAXATIVES/STOOL SOFTNERS IF > 2BM IN 1 DAY Patient taking differently: TAKE 1 TABLET BY MOUTH TWICE DAILY AT 9am AND 9p PRN   acetaminophen (TYLENOL) 325 mg tablet   Yes Yes   Sig: Take 650 mg by mouth every eight (8) hours as needed for Pain. acetaminophen (TYLENOL) 500 mg tablet   Yes Yes   Sig: Take 500 mg by mouth two (2) times a day. fluticasone (FLONASE) 50 mcg/actuation nasal spray 2018 at Unknown time  Yes Yes   Si Sprays by Both Nostrils route daily. furosemide (LASIX) 40 mg tablet   Yes Yes   Sig: Take 80 mg by mouth daily. furosemide (LASIX) 40 mg tablet   Yes Yes   Sig: Take 40 mg by mouth every evening. metoprolol (LOPRESSOR) 25 mg tablet 2018 at Unknown time  Yes Yes   Sig: Take 12.5 mg by mouth two (2) times a day. nystatin (MYCOSTATIN) topical cream 2018 at Unknown time  Yes Yes   Sig: Apply  to affected area three (3) times daily. Apply to groin and sacrum every shift (7-3, 3-11, 11-7)   pantoprazole (PROTONIX) 40 mg tablet 2018 at Unknown time  No Yes   Sig: Take 1 tablet by mouth Before breakfast and dinner. rosuvastatin (CRESTOR) 20 mg tablet 2018 at Unknown time  Yes Yes   Sig: Take 20 mg by mouth nightly.       Facility-Administered Medications: None       Hospital Medications:  Current Facility-Administered Medications   Medication Dose Route Frequency    metroNIDAZOLE (FLAGYL) IVPB premix 500 mg  500 mg IntraVENous Q8H    famotidine (PEPCID) tablet 20 mg  20 mg Oral DAILY    0.45% sodium chloride infusion  50 mL/hr IntraVENous CONTINUOUS    balsam peru-castor oil (VENELEX)  mg/gram ointment   Topical BID    piperacillin-tazobactam (ZOSYN) 10.125 g in  mL continuous 24 hr infusion  10.125 g IntraVENous Q24H    sodium chloride (NS) flush 5-10 mL  5-10 mL IntraVENous Q8H    sodium chloride (NS) flush 5-10 mL  5-10 mL IntraVENous PRN    sodium chloride (NS) flush 10-30 mL  10-30 mL InterCATHeter PRN    sodium chloride (NS) flush 10 mL  10 mL InterCATHeter Q24H    sodium chloride (NS) flush 10 mL  10 mL InterCATHeter PRN    sodium chloride (NS) flush 10-40 mL  10-40 mL InterCATHeter Q8H    sodium chloride (NS) flush 20 mL  20 mL InterCATHeter Q24H    alteplase (CATHFLO) 1 mg in sterile water (preservative free) 1 mL injection  1 mg InterCATHeter PRN    heparin (porcine) injection 5,000 Units  5,000 Units SubCUTAneous Q12H    sodium chloride (NS) flush 5-10 mL  5-10 mL IntraVENous PRN       Social History:  Social History   Substance Use Topics    Smoking status: Former Smoker     Years: 16.00     Quit date: 1/23/1971    Smokeless tobacco: Not on file    Alcohol use No       Family History:  Family History   Problem Relation Age of Onset    Stroke Mother     Heart Disease Father     Cancer Brother      lung    Other Brother      NON ALCOHOLIC CIRRHOSIS       Review of Systems:  Constitutional: negative fever, negative chills, negative weight loss  Eyes:   negative visual changes  ENT:   negative sore throat, tongue or lip swelling  Respiratory:  negative cough, negative dyspnea  Cards:  negative for chest pain, palpitations, lower extremity edema  GI:   See HPI  :  negative for frequency, dysuria  Integument:  negative for rash and pruritus  Heme:  + for easy bruising, negative gum/nose bleeding  Musculoskeletal:negative for myalgias, back pain and muscle weakness  Neuro:    negative for headaches, dizziness, vertigo  Psych: negative for feelings of anxiety, depression     Objective:   Patient Vitals for the past 8 hrs:   BP Temp Pulse Resp SpO2 Weight   02/06/18 1022 - - - - - 74 kg (163 lb 2.3 oz)   02/06/18 0815 104/58 98.3 °F (36.8 °C) 79 18 95 % -   02/06/18 0348 115/67 98 °F (36.7 °C) 84 16 94 % -        02/04 1901 - 02/06 0700  In: -   Out: 300 [Urine:300]    EXAM:     CONST:  Pleasant male lying in bed, no acute distress   NEURO:  alert   HEENT: EOMI, no scleral icterus   LUNGS: clear to ausculation bilaterally anteriorly   CARD:  regular rate and rhythm, S1 S2   ABD:  soft, no tenderness, no guarding, rebound, bowel sounds (+) all 4 quadrants, no masses, non distended   EXT:  no edema, warm   PSYCH: not anxious or agitated     Data Review     Recent Labs      02/04/18   0328   WBC  6.6   HGB  8.5*   HCT  26.7*   PLT  279     Recent Labs      02/05/18   0413  02/04/18   0328   NA  145  146*   K  4.0  4.1   CL  116*  115*   CO2  24  23   BUN  29*  34*   CREA  1.61*  1.59*   GLU  88  78   CA  7.5*  7.5*     Recent Labs      02/05/18   0413  02/04/18   0328   SGOT  22  28   AP  251*  309*   TP  4.9*  5.4*   ALB  1.6*  1.7*   GLOB  3.3  3.7     No results for input(s): INR, PTP, APTT in the last 72 hours. No lab exists for component: INREXT    1/31/18  EXAM:  CT ABD PELV WO CONT  Clinical history: Elevated white count  INDICATION: Elevated white count, hypertension, joint pain generalized body  aches, history of dementia     COMPARISON: 3/20/2013     CONTRAST:  None.     TECHNIQUE:   Thin axial images were obtained through the abdomen and pelvis. Coronal and  sagittal reconstructions were generated. Oral contrast was not administered. CT  dose reduction was achieved through use of a standardized protocol tailored for  this examination and automatic exposure control for dose modulation.      The absence of intravenous contrast material reduces the sensitivity for  evaluation of the solid parenchymal organs of the abdomen.      FINDINGS:   LUNG BASES: Minimal basilar atelectasis on the left. INCIDENTALLY IMAGED HEART AND MEDIASTINUM: Coronary vascular calcifications. LIVER: No mass or biliary dilatation. GALLBLADDER: Cholelithiasis  SPLEEN: No mass. PANCREAS: No mass or ductal dilatation. ADRENALS: Unremarkable. KIDNEYS/URETERS: Renal cortical atrophy. STOMACH: Nondistended. SMALL BOWEL: No dilatation or wall thickening. COLON: Fecal stasis. APPENDIX: Not clearly visualized. PERITONEUM: No ascites or pneumoperitoneum.   RETROPERITONEUM: Aortic atherosclerotic change. Retroperitoneal adenopathy 15 x  16 mm in size. Nonspecific. REPRODUCTIVE ORGANS: The prostate is not enlarged. URINARY BLADDER: There is significant bladder wall thickening with bladder  trabeculation. This is significantly increased compared to the prior study even  considering the degree of nondistention. BONES: There is severe degenerative change in the femoral acetabular joint on  the right and on the left. Scoliosis of the lumbar spine. Spondylolisthesis in  the lumbar spine. ADDITIONAL COMMENTS: N/A     IMPRESSION  IMPRESSION:  Significant bladder wall thickening. This may be related to chronic bladder  obstruction. Neoplastic etiology is not excluded. Cholelithiasis. Severe degenerative change of the femoral acetabular joints and in the lumbar  spine. Minimal nonspecific retroperitoneal adenopathy. Indeterminate etiology. Assessment:   · Sepsis: Blood culture (1/31): Clostridium perfringens, E. coli. CT abdomen/pelvis without contrast (1/31): significant bladder wall thickening; cholelithiasis; minimal nonspecific retroperitoneal adenopathy, indeterminate etiology. Repeat blood culture (2/1): no growth. On Zosyn and Flagyl. WBC normal (2/4). · Pseudomonas aeruginosa UTI     Patient Active Problem List   Diagnosis Code    Tremor R25.1    Weakness R53.1    Sinus tachycardia R00.0    ARF [facility]acute renal failure) ([facility]) N17.9    Hypercholesteremia E78.00    Hypertension I10    Arthritis M19.90    Peripheral neuropathy G62.9    Renal failure N19    Falls W19. Roanne Born    Rhabdomyolysis M62.82    Weight loss R63.4    Night sweats R61    Anorexia R63.0    Anemia D64.9    UTI (urinary tract infection) N39.0    CKD (chronic kidney disease) stage 3, GFR 30-59 ml/min N18.3    Chronic anemia D64.9    Debility R53.81    Status post hip surgery Z98.890    Chronic pain of right knee M25.561, G89.29    JAMES (acute kidney injury) ([facility] [address] N17.9    Venous insufficiency of both lower extremities I87.2    Hyperlipidemia E78.5    Urinary retention R33.9    Busby catheter in place Z92.89    Dehydration E86.0    Leukocytosis D72.829    Shock ([address] R57.9    Hypotension I95.9    Abnormal urinalysis R82.90    SIRS (systemic inflammatory response syndrome) (HCC) R65.10     Plan:     · Continue antibiotics per ID  · Plan for future colonoscopy, prior to discharge versus outpatient. Timing to be determined. · Thank you for allowing me to participate in care of Antonette Hernandez       Signed By: ANA CRISTINA Mak     2/6/2018  10:59 AM     Patient seen and examined, agree with plans, GI pathology has been associated with C. Perfringens, and we are agree colonoscopy is warranted. This can be done just prior to discharge or as an outpatient.     Alisha Kuhn MD

## 2018-02-06 NOTE — PROGRESS NOTES
ID Progress Note  2018    Subjective:     No new complaints, just doesn't feel all that well    Objective:     Antibiotics:  1. Cefepime   2. Flagyl      Vitals:   Visit Vitals    /62 (BP 1 Location: Right arm, BP Patient Position: At rest)    Pulse 89    Temp 97.7 °F (36.5 °C)    Resp 18    Ht 5' 7\" (1.702 m)    Wt 74 kg (163 lb 2.3 oz)    SpO2 96%    BMI 25.55 kg/m2        Tmax:  Temp (24hrs), Av.3 °F (36.8 °C), Min:97.7 °F (36.5 °C), Max:99.1 °F (37.3 °C)      Exam:  Lungs:  clear to auscultation bilaterally  Heart:  regular rate and rhythm  Abdomen:  soft, non-tender. Bowel sounds normal. No masses,  no organomegaly    Labs:      Recent Labs      18   0413  18   0328   WBC   --   6.6   HGB   --   8.5*   PLT   --   279   BUN  29*  34*   CREA  1.61*  1.59*   SGOT  22  28   AP  251*  309*   TBILI  0.6  0.9       Cultures:     Lab Results   Component Value Date/Time    Specimen Description: URINE 2014 02:39 PM    Specimen Description: NARES 2014 02:15 PM    Specimen Description: BLOOD 2013 08:35 PM     Lab Results   Component Value Date/Time    Culture result: NO GROWTH 5 DAYS 2018 04:21 PM    Culture result: PSEUDOMONAS AERUGINOSA (A) 2018 08:48 PM    Culture result: (A) 2018 08:25 PM     CLOSTRIDIUM PERFRINGENS GROWING IN 1 OF 2 BOTTLES DRAWN (SITE= R AC)    Culture result: (A) 2018 08:25 PM     PRELIMINARY REPORT OF  GRAM POSITIVE RODS  GROWING IN 1 OF 2 BOTTLES DRAWN  CALLED TO AND READ BACK BY  ENIO PITTS RN AT 0264 18. LW      Culture result: (A) 2018 08:25 PM     ESCHERICHIA COLI ISOLATED FROM THE SAME 1 OF 2 BOTTLES. .. Culture result: REMAINING BOTTLE(S) HAS/HAVE NO GROWTH IN 5 DAYS 2018 08:25 PM       Radiology:     Line/Insert Date:           Assessment:     1. Clostridial bacteremia  2. E coli bacteremia  3. Pseudomonas from urine culture    Objective:     1.  Change back to [id number] [address] Leonardo MD

## 2018-02-06 NOTE — PROGRESS NOTES
Problem: Mobility Impaired (Adult and Pediatric)  Goal: *Acute Goals and Plan of Care (Insert Text)  Physical Therapy Goals  Initiated 2/5/2018  1. Patient will move from supine to sit and sit to supine  and scoot up and down in bed with moderate assistance  within 7 day(s). 2.  Patient will transfer from bed to chair and chair to bed with maximal assistance using the least restrictive device within 7 day(s). 3.  Patient will perform sit to stand with maximal assistance within 7 day(s). physical Therapy TREATMENT  Patient: Saad Rodriguez (23 y.o. male)  Date: 2/6/2018  Diagnosis: SIRS (systemic inflammatory response syndrome) (HCC)  Shock ([facility] [address]  Hypotension  Leukocytosis  Abnormal urinalysis  Dehydration Shock ([facility] [address]       Precautions: Fall  Chart, physical therapy assessment, plan of care and goals were reviewed. ASSESSMENT:  Patient making slow progress towards goals, limited by pain. Patient able to perform supine to sit with mod assist x 1 and max verbal cues. Once sitting, patient with good static sitting balance. Attempted standing x 2 trials with max assist, but patient unable due to pain and weakness. Worked on using UEs for scooting while seated EOB, then patient returned to supine. Bed placed in trendelenburg and patient able to assist with scooting up in bed using B LEs. Patient positioned for comfort at end of session. Recommend SNF at discharge due to significantly impaired mobility. Will continue to follow. Progression toward goals:  []    Improving appropriately and progressing toward goals  [x]    Improving slowly and progressing toward goals  []    Not making progress toward goals and plan of care will be adjusted     PLAN:  Patient continues to benefit from skilled intervention to address the above impairments. Continue treatment per established plan of care.   Discharge Recommendations:  Gareth Charles  Further Equipment Recommendations for Discharge:  none     SUBJECTIVE: Patient stated Ugh, I guess we'll do it if we have to.     OBJECTIVE DATA SUMMARY:   Critical Behavior:  Neurologic State: Alert  Orientation Level: Oriented X4  Cognition: Appropriate for age attention/concentration, Follows commands  Safety/Judgement: Decreased awareness of need for safety, Decreased awareness of need for assistance, Decreased insight into deficits  Functional Mobility Training:  Bed Mobility:     Supine to Sit: Moderate assistance; Additional time;Assist x1  Sit to Supine: Maximum assistance;Assist x1           Transfers:  Sit to Stand:  (unable to stand)                                Balance:  Sitting: Impaired  Sitting - Static: Good (unsupported)  Sitting - Dynamic: Fair (occasional)  Standing:  (unable to stand)  Ambulation/Gait Training:   unable             Therapeutic Exercises: Ankle pumps, quad sets x 10 reps each  Working on using UEs while seated EOB to assist with scooting    Pain:  Pain Scale 1: Numeric (0 - 10)  Pain Intensity 1: 0              Activity Tolerance:   VSS  Please refer to the flowsheet for vital signs taken during this treatment.   After treatment:   []    Patient left in no apparent distress sitting up in chair  [x]    Patient left in no apparent distress in bed  [x]    Call bell left within reach  [x]    Nursing notified  []    Caregiver present  []    Bed alarm activated    COMMUNICATION/COLLABORATION:   The patients plan of care was discussed with: Registered Nurse    Shruthi Knight, PT   Time Calculation: 23 mins

## 2018-02-07 ENCOUNTER — APPOINTMENT (OUTPATIENT)
Dept: GENERAL RADIOLOGY | Age: 83
DRG: 871 | End: 2018-02-07
Attending: HOSPITALIST
Payer: MEDICARE

## 2018-02-07 LAB
ALBUMIN SERPL-MCNC: 1.6 G/DL (ref 3.5–5)
ALBUMIN/GLOB SERPL: 0.5 {RATIO} (ref 1.1–2.2)
ALP SERPL-CCNC: 210 U/L (ref 45–117)
ALT SERPL-CCNC: 27 U/L (ref 12–78)
ANION GAP SERPL CALC-SCNC: 10 MMOL/L (ref 5–15)
AST SERPL-CCNC: 25 U/L (ref 15–37)
BACTERIA SPEC CULT: ABNORMAL
BILIRUB SERPL-MCNC: 0.6 MG/DL (ref 0.2–1)
BUN SERPL-MCNC: 23 MG/DL (ref 6–20)
BUN/CREAT SERPL: 15 (ref 12–20)
CALCIUM SERPL-MCNC: 7.6 MG/DL (ref 8.5–10.1)
CHLORIDE SERPL-SCNC: 116 MMOL/L (ref 97–108)
CO2 SERPL-SCNC: 21 MMOL/L (ref 21–32)
CREAT SERPL-MCNC: 1.58 MG/DL (ref 0.7–1.3)
ERYTHROCYTE [DISTWIDTH] IN BLOOD BY AUTOMATED COUNT: 21.2 % (ref 11.5–14.5)
GLOBULIN SER CALC-MCNC: 3.4 G/DL (ref 2–4)
GLUCOSE SERPL-MCNC: 82 MG/DL (ref 65–100)
HCT VFR BLD AUTO: 23.6 % (ref 36.6–50.3)
HGB BLD-MCNC: 7.5 G/DL (ref 12.1–17)
MCH RBC QN AUTO: 29.4 PG (ref 26–34)
MCHC RBC AUTO-ENTMCNC: 31.8 G/DL (ref 30–36.5)
MCV RBC AUTO: 92.5 FL (ref 80–99)
NRBC # BLD: 0.02 K/UL (ref 0–0.01)
NRBC BLD-RTO: 0.3 PER 100 WBC
PLATELET # BLD AUTO: 388 K/UL (ref 150–400)
PMV BLD AUTO: 10.3 FL (ref 8.9–12.9)
POTASSIUM SERPL-SCNC: 4 MMOL/L (ref 3.5–5.1)
PROT SERPL-MCNC: 5 G/DL (ref 6.4–8.2)
RBC # BLD AUTO: 2.55 M/UL (ref 4.1–5.7)
SERVICE CMNT-IMP: ABNORMAL
SODIUM SERPL-SCNC: 147 MMOL/L (ref 136–145)
WBC # BLD AUTO: 7.7 K/UL (ref 4.1–11.1)

## 2018-02-07 PROCEDURE — 80053 COMPREHEN METABOLIC PANEL: CPT | Performed by: HOSPITALIST

## 2018-02-07 PROCEDURE — 74011000250 HC RX REV CODE- 250: Performed by: HOSPITALIST

## 2018-02-07 PROCEDURE — 36569 INSJ PICC 5 YR+ W/O IMAGING: CPT | Performed by: HOSPITALIST

## 2018-02-07 PROCEDURE — 85027 COMPLETE CBC AUTOMATED: CPT | Performed by: HOSPITALIST

## 2018-02-07 PROCEDURE — 87040 BLOOD CULTURE FOR BACTERIA: CPT | Performed by: HOSPITALIST

## 2018-02-07 PROCEDURE — 74011250636 HC RX REV CODE- 250/636: Performed by: INTERNAL MEDICINE

## 2018-02-07 PROCEDURE — 02HV33Z INSERTION OF INFUSION DEVICE INTO SUPERIOR VENA CAVA, PERCUTANEOUS APPROACH: ICD-10-PCS | Performed by: HOSPITALIST

## 2018-02-07 PROCEDURE — 74011250637 HC RX REV CODE- 250/637: Performed by: HOSPITALIST

## 2018-02-07 PROCEDURE — 76937 US GUIDE VASCULAR ACCESS: CPT

## 2018-02-07 PROCEDURE — 77030020847 HC STATLOK BARD -A

## 2018-02-07 PROCEDURE — 74011000258 HC RX REV CODE- 258: Performed by: HOSPITALIST

## 2018-02-07 PROCEDURE — 36415 COLL VENOUS BLD VENIPUNCTURE: CPT | Performed by: HOSPITALIST

## 2018-02-07 PROCEDURE — 77030018719 HC DRSG PTCH ANTIMIC J&J -A

## 2018-02-07 PROCEDURE — C1751 CATH, INF, PER/CENT/MIDLINE: HCPCS

## 2018-02-07 PROCEDURE — 77030020365 HC SOL INJ SOD CL 0.9% 50ML

## 2018-02-07 PROCEDURE — 74250 X-RAY XM SM INT 1CNTRST STD: CPT

## 2018-02-07 PROCEDURE — 65270000029 HC RM PRIVATE

## 2018-02-07 PROCEDURE — 74011250636 HC RX REV CODE- 250/636: Performed by: HOSPITALIST

## 2018-02-07 RX ORDER — SODIUM CHLORIDE 0.9 % (FLUSH) 0.9 %
10 SYRINGE (ML) INJECTION EVERY 24 HOURS
Status: DISCONTINUED | OUTPATIENT
Start: 2018-02-07 | End: 2018-02-08 | Stop reason: HOSPADM

## 2018-02-07 RX ORDER — SODIUM CHLORIDE 0.9 % (FLUSH) 0.9 %
10 SYRINGE (ML) INJECTION AS NEEDED
Status: DISCONTINUED | OUTPATIENT
Start: 2018-02-07 | End: 2018-02-08 | Stop reason: HOSPADM

## 2018-02-07 RX ORDER — SODIUM CHLORIDE 0.9 % (FLUSH) 0.9 %
10 SYRINGE (ML) INJECTION EVERY 8 HOURS
Status: DISCONTINUED | OUTPATIENT
Start: 2018-02-07 | End: 2018-02-08 | Stop reason: HOSPADM

## 2018-02-07 RX ORDER — BACITRACIN 500 UNIT/G
1 PACKET (EA) TOPICAL ONCE
Status: COMPLETED | OUTPATIENT
Start: 2018-02-07 | End: 2018-02-07

## 2018-02-07 RX ORDER — SODIUM CHLORIDE 0.9 % (FLUSH) 0.9 %
20 SYRINGE (ML) INJECTION AS NEEDED
Status: DISCONTINUED | OUTPATIENT
Start: 2018-02-07 | End: 2018-02-08 | Stop reason: HOSPADM

## 2018-02-07 RX ORDER — BACITRACIN 500 UNIT/G
1 PACKET (EA) TOPICAL AS NEEDED
Status: DISCONTINUED | OUTPATIENT
Start: 2018-02-07 | End: 2018-02-08 | Stop reason: HOSPADM

## 2018-02-07 RX ORDER — DEXTROSE MONOHYDRATE 50 MG/ML
75 INJECTION, SOLUTION INTRAVENOUS CONTINUOUS
Status: DISPENSED | OUTPATIENT
Start: 2018-02-07 | End: 2018-02-08

## 2018-02-07 RX ADMIN — CASTOR OIL AND BALSAM, PERU: 788; 87 OINTMENT TOPICAL at 17:37

## 2018-02-07 RX ADMIN — HEPARIN SODIUM 5000 UNITS: 5000 INJECTION, SOLUTION INTRAVENOUS; SUBCUTANEOUS at 21:41

## 2018-02-07 RX ADMIN — FAMOTIDINE 20 MG: 20 TABLET, FILM COATED ORAL at 09:23

## 2018-02-07 RX ADMIN — Medication 10 ML: at 07:00

## 2018-02-07 RX ADMIN — HEPARIN SODIUM 5000 UNITS: 5000 INJECTION, SOLUTION INTRAVENOUS; SUBCUTANEOUS at 09:23

## 2018-02-07 RX ADMIN — Medication 20 ML: at 09:26

## 2018-02-07 RX ADMIN — CASTOR OIL AND BALSAM, PERU: 788; 87 OINTMENT TOPICAL at 09:25

## 2018-02-07 RX ADMIN — DEXTROSE MONOHYDRATE 75 ML/HR: 5 INJECTION, SOLUTION INTRAVENOUS at 10:52

## 2018-02-07 RX ADMIN — PIPERACILLIN AND TAZOBACTAM 10.12 G: 3; .375 INJECTION, POWDER, FOR SOLUTION INTRAVENOUS at 17:45

## 2018-02-07 RX ADMIN — BACITRACIN 1 PACKET: 500 OINTMENT TOPICAL at 19:00

## 2018-02-07 RX ADMIN — SODIUM CHLORIDE 50 ML/HR: 450 INJECTION, SOLUTION INTRAVENOUS at 09:30

## 2018-02-07 RX ADMIN — Medication 10 ML: at 09:26

## 2018-02-07 NOTE — PROGRESS NOTES
No real change overnight. I would like to do colonoscopy towards the end of his hospital or as outpatient. Please notify me prior to discharge or have him make an appointment after discharge.

## 2018-02-07 NOTE — ROUTINE PROCESS
Bedside shift change report given to [facility] (oncoming nurse) by Tasha Jesus (offgoing nurse). Report included the following information SBAR, Kardex, ED Summary, Procedure Summary, Intake/Output and MAR.

## 2018-02-07 NOTE — PROGRESS NOTES
ID Progress Note  2018    Subjective:     No new complaints, just doesn't feel all that well    Objective:     Antibiotics:  1. Cefepime   2. Flagyl      Vitals:   Visit Vitals    /60 (BP 1 Location: Right arm, BP Patient Position: At rest)    Pulse 88    Temp 98.2 °F (36.8 °C)    Resp 18    Ht 5' 7\" (1.702 m)    Wt 74.2 kg (163 lb 9.3 oz)    SpO2 96%    BMI 25.62 kg/m2        Tmax:  Temp (24hrs), Av.6 °F (37 °C), Min:98.1 °F (36.7 °C), Max:99.2 °F (37.3 °C)      Exam:  Lungs:  clear to auscultation bilaterally  Heart:  regular rate and rhythm  Abdomen:  soft, non-tender. Bowel sounds normal. No masses,  no organomegaly    Labs:      Recent Labs      18   0507  18   0413   WBC  7.7   --    HGB  7.5*   --    PLT  388   --    BUN  23*  29*   CREA  1.58*  1.61*   SGOT  25  22   AP  210*  251*   TBILI  0.6  0.6       Cultures:     Lab Results   Component Value Date/Time    Specimen Description: URINE 2014 02:39 PM    Specimen Description: NARES 2014 02:15 PM    Specimen Description: BLOOD 2013 08:35 PM     Lab Results   Component Value Date/Time    Culture result: NO GROWTH 5 DAYS 2018 04:21 PM    Culture result: PSEUDOMONAS AERUGINOSA (A) 2018 08:48 PM    Culture result: (A) 2018 08:25 PM     CLOSTRIDIUM PERFRINGENS GROWING IN 1 OF 2 BOTTLES DRAWN (SITE= R AC)    Culture result: (A) 2018 08:25 PM     PRELIMINARY REPORT OF  GRAM POSITIVE RODS  GROWING IN 1 OF 2 BOTTLES DRAWN  CALLED TO AND READ BACK BY  ENIO PITTS RN AT 8304 18. LW      Culture result: (A) 2018 08:25 PM     ESCHERICHIA COLI ISOLATED FROM THE SAME 1 OF 2 BOTTLES. .. Culture result: REMAINING BOTTLE(S) HAS/HAVE NO GROWTH IN 5 DAYS 2018 08:25 PM       Radiology:     Line/Insert Date:           Assessment:     1. Clostridial bacteremia  2. E coli bacteremia  3. Pseudomonas from urine culture    Objective:     1.  Change back to Zosyn--needs about one more week of therapy    Giovanny Rosas MD

## 2018-02-07 NOTE — PROGRESS NOTES
CM reviewed case with treatment team during Bedside Interdisciplinary Rounds. Patient continues with IV antibiotics. Per physician and PT/OT SNF rehab is recommended, and patient agreed. Patient lives at [address] [place] [address] with his wife. Patient selected [facility] SNF. CM subsequently sent referral to [facility] SNF, and response is pending. CM to continue to follow for discharge planning needs.

## 2018-02-07 NOTE — PROGRESS NOTES
Physical therapy note:   Patient currently off the floor for testing. Will continue to follow. Debo Peraza, EDMOND, DPT

## 2018-02-07 NOTE — PROCEDURES
PICC Placement Note. Order received. Dr. Leena Freire confirmed that a PICC would be the best vascular access device for this patient. Will proceed with PICC procedure as ordered. PICC procedure, risks, benefits and complications reviewed with the patient. Blanquita Bishop signed informed consent for bedside placement of PICC line. PRE-PROCEDURE VERIFICATION  Correct Procedure: yes  Correct Site:  yes  Temperature: Temp: 98.2 °F (36.8 °C), Temperature Source: Temp Source: Oral  Recent Labs      02/07/18   0507   BUN  23*   CREA  1.58*   PLT  388   WBC  7.7     Allergies: Ciprofloxacin and Nsaids (non-steroidal anti-inflammatory drug)  Education materials, including PICC Booklet, for PICC Care given to patient: yes. See Patient Education activity for further details. PROCEDURE DETAIL  A single lumen PICC line was started for antibiotic therapy, desire for reliable access and nurse/physician request. The following documentation is in addition to the PICC properties in the lines/airways flowsheet : The vein was accessed with a 20g IV catheter using ultrasound guidance and a guidewire was easily thread. Modified Seldinger Technique was used to thread the PICC and the tip direction was determined with a PICC tip  device  Lot #: [id number]  Was xylocaine 1% used intradermally:  yes  Catheter Length: 41 (cm) right upper arm circumference 27cm  Vein Selection for PICC:right basilic  Central Line Bundle followed yes  Complication Related to Insertion: none    The placement was verified by ECG technology:  Waveform placed on the patient's chart to document that the PICC tip is in the superior vena cava. Report given to Debbi Khan RN  The PICC is functioning properly and ready for immediate use.

## 2018-02-07 NOTE — PROGRESS NOTES
Hospitalist Progress Note  Quincy Charles MD  Answering service: [phone] from in house phone        Date of Service:  2018  NAME:  Marisa Reyes  :  1935  MRN:  [id number]      Admission Summary: An 71-year-old white male with past medical history of arthritis, arrhythmia, constipation, hearing loss, hypertension, hyperlipidemia, joint pain, anemia, memory loss, who presented to the emergency department via EMS from assisted living facility with a chief complaint of generalized body aches. Interval history / Subjective:   Mr. Matteo Borja is feeling about the same. No pain, SOB, nausea, diarrhea.      Assessment & Plan:     Sepsis with septic shock with E.coli and clostridial bacteremia (POA) - GI source?  - CT abdomen/pelvis  with bladder wall thickening, cholelithiasis, RP adenopathy, degenerative arthritis  - Blood cx  with clostridium perfringens, e.coli, gram positive rods, repeat ,  no growth  - Obtain SBFT, colonoscopy as outpatient  - Continue zosyn started  for 2 week course  - off levophed  - Continue IVF  - ID, GI consulted - plan for discharge and complete course of IV antibiotics and then colonoscopy to rule out GI structural abnormality as a cause of his bacteremia    Pseudomonas Aeruginosa UTI (POA)  - Urine cx  with 50K CFU pseudomonas  - Continue zosyn started on     Bladder wall thickening on CT (POA) - infection vs neoplasm vs due to bladder outlet obstruction, no hematuria   - Urologist consulted and recommendation for outpatient cystoscopy     Dehydration (POA) - improved with IVF  - Monitor I/O    Elevated transaminase possible due to sepsis - resolved    CKD stage 4 - creatinine improving, on IVF  - Continue to monitor renal function and urine output     Anemia of chronic disease   - H/H is trending down, no evidence of active bleeding  - monitor H/H    Hypokalemia - monitor and replace as needed    Pressure ulcer at left heel and sacral area  - Wound care consulted     Debility - he said he usually stay in recliner and uses wheelchair with help  - PT/OT    Code status: Full Code  DVT prophylaxis: Heparin    Care Plan discussed with: Patient/Family, Nurse and   Disposition: From [facility] assisted living facility, will need SNF on discharge, likely tomorrow     Hospital Problems  Date Reviewed: 2/1/2018          Codes Class Noted POA    Dehydration ICD-10-CM: E86.0  ICD-9-CM: 276.51  1/31/2018 Unknown        Leukocytosis ICD-10-CM: D72.829  ICD-9-CM: 288.60  1/31/2018 Unknown        * (Principal)Shock ([facility] ICD-10-CM: R57.9  ICD-9-CM: 785.50  1/31/2018 Unknown        Hypotension ICD-10-CM: I95.9  ICD-9-CM: 458.9  1/31/2018 Unknown        Abnormal urinalysis ICD-10-CM: R82.90  ICD-9-CM: 791.9  1/31/2018 Unknown        SIRS (systemic inflammatory response syndrome) ([facility] ICD-10-CM: R65.10  ICD-9-CM: 995.90  1/31/2018 Unknown              Vital Signs:    Last 24hrs VS reviewed since prior progress note. Most recent are:  Visit Vitals    /58 (BP 1 Location: Right arm, BP Patient Position: Sitting)    Pulse 90    Temp 98.1 °F (36.7 °C)    Resp 18    Ht 5' 7\" (1.702 m)    Wt 74 kg (163 lb 2.3 oz)    SpO2 94%    BMI 25.55 kg/m2         Intake/Output Summary (Last 24 hours) at 02/07/18 1041  Last data filed at 02/06/18 1338   Gross per 24 hour   Intake                0 ml   Output              300 ml   Net             -300 ml        Physical Examination:             Constitutional:  No acute distress, cooperative, pleasant    ENT:  Oral mucous moist, oropharynx benign. Neck supple,    Resp:  Decrease bronchial breath sound bilaterally. No wheezing/rhonchi/rales. No accessory muscle use   CV:  Regular rhythm, normal rate, no murmurs, gallops, rubs    GI:  Soft, non distended, non tender.  normoactive bowel sounds, no hepatosplenomegaly     Musculoskeletal:  No edema    Neurologic:  Conscious and alert, oriented to place and person, moves all extremities     Skin:  left heel and sacral area pressure ulcer       Data Review:     Telemetry    ECG    Xray    CT scan    MRI    Echocardiogram    Ultrasound              I have reviewed the flow sheet and recent notes  New labs and data personally reviewed.   ______________________________________________________________________  EXPECTED LENGTH OF STAY: 4d 21h  ACTUAL LENGTH OF STAY:          7                 Stephanie Ferrer MD

## 2018-02-08 VITALS
WEIGHT: 168.43 LBS | HEART RATE: 81 BPM | HEIGHT: 67 IN | OXYGEN SATURATION: 98 % | SYSTOLIC BLOOD PRESSURE: 129 MMHG | RESPIRATION RATE: 15 BRPM | BODY MASS INDEX: 26.44 KG/M2 | TEMPERATURE: 98.4 F | DIASTOLIC BLOOD PRESSURE: 69 MMHG

## 2018-02-08 LAB
ANION GAP SERPL CALC-SCNC: 8 MMOL/L (ref 5–15)
BUN SERPL-MCNC: 19 MG/DL (ref 6–20)
BUN/CREAT SERPL: 14 (ref 12–20)
CALCIUM SERPL-MCNC: 7.5 MG/DL (ref 8.5–10.1)
CHLORIDE SERPL-SCNC: 115 MMOL/L (ref 97–108)
CO2 SERPL-SCNC: 23 MMOL/L (ref 21–32)
CREAT SERPL-MCNC: 1.34 MG/DL (ref 0.7–1.3)
ERYTHROCYTE [DISTWIDTH] IN BLOOD BY AUTOMATED COUNT: 21.1 % (ref 11.5–14.5)
GLUCOSE SERPL-MCNC: 96 MG/DL (ref 65–100)
HCT VFR BLD AUTO: 23.7 % (ref 36.6–50.3)
HGB BLD-MCNC: 7.3 G/DL (ref 12.1–17)
MCH RBC QN AUTO: 28.9 PG (ref 26–34)
MCHC RBC AUTO-ENTMCNC: 30.8 G/DL (ref 30–36.5)
MCV RBC AUTO: 93.7 FL (ref 80–99)
NRBC # BLD: 0 K/UL (ref 0–0.01)
NRBC BLD-RTO: 0 PER 100 WBC
PLATELET # BLD AUTO: 393 K/UL (ref 150–400)
PMV BLD AUTO: 10.4 FL (ref 8.9–12.9)
POTASSIUM SERPL-SCNC: 3.9 MMOL/L (ref 3.5–5.1)
RBC # BLD AUTO: 2.53 M/UL (ref 4.1–5.7)
SODIUM SERPL-SCNC: 146 MMOL/L (ref 136–145)
WBC # BLD AUTO: 6 K/UL (ref 4.1–11.1)

## 2018-02-08 PROCEDURE — 74011250637 HC RX REV CODE- 250/637: Performed by: HOSPITALIST

## 2018-02-08 PROCEDURE — 74011250636 HC RX REV CODE- 250/636: Performed by: INTERNAL MEDICINE

## 2018-02-08 PROCEDURE — 80048 BASIC METABOLIC PNL TOTAL CA: CPT | Performed by: HOSPITALIST

## 2018-02-08 PROCEDURE — 74011250636 HC RX REV CODE- 250/636: Performed by: HOSPITALIST

## 2018-02-08 PROCEDURE — 85027 COMPLETE CBC AUTOMATED: CPT | Performed by: HOSPITALIST

## 2018-02-08 PROCEDURE — 36415 COLL VENOUS BLD VENIPUNCTURE: CPT | Performed by: HOSPITALIST

## 2018-02-08 RX ORDER — FUROSEMIDE 40 MG/1
40 TABLET ORAL
Status: DISCONTINUED | OUTPATIENT
Start: 2018-02-08 | End: 2018-02-08 | Stop reason: HOSPADM

## 2018-02-08 RX ORDER — FUROSEMIDE 40 MG/1
80 TABLET ORAL DAILY
Status: DISCONTINUED | OUTPATIENT
Start: 2018-02-09 | End: 2018-02-08 | Stop reason: HOSPADM

## 2018-02-08 RX ORDER — FUROSEMIDE 10 MG/ML
80 INJECTION INTRAMUSCULAR; INTRAVENOUS ONCE
Status: COMPLETED | OUTPATIENT
Start: 2018-02-08 | End: 2018-02-08

## 2018-02-08 RX ORDER — BACITRACIN 500 UNIT/G
1 PACKET (EA) TOPICAL AS NEEDED
Qty: 10 PACKET | Refills: 0 | Status: SHIPPED
Start: 2018-02-08

## 2018-02-08 RX ADMIN — HEPARIN SODIUM 5000 UNITS: 5000 INJECTION, SOLUTION INTRAVENOUS; SUBCUTANEOUS at 10:58

## 2018-02-08 RX ADMIN — Medication 10 ML: at 07:00

## 2018-02-08 RX ADMIN — CASTOR OIL AND BALSAM, PERU: 788; 87 OINTMENT TOPICAL at 08:29

## 2018-02-08 RX ADMIN — FUROSEMIDE 80 MG: 10 INJECTION, SOLUTION INTRAVENOUS at 10:58

## 2018-02-08 RX ADMIN — Medication 20 ML: at 07:01

## 2018-02-08 RX ADMIN — Medication 10 ML: at 07:01

## 2018-02-08 RX ADMIN — FAMOTIDINE 20 MG: 20 TABLET, FILM COATED ORAL at 08:29

## 2018-02-08 NOTE — PROGRESS NOTES
Hospital to [facility] SBAR Handoff - [facility]                                                                        81 y.o.   male    [address]   Room: [facility] [address]  Unit Phone# :  [phone]      [place] [address] [address] [facility] [address]  Dept: [address]: [phone]                    SITUATION     Admitted:  1/31/2018         Attending Provider:  Jason Salazar*       Consultations:  IP CONSULT TO HOSPITALIST  IP CONSULT TO UROLOGY  IP CONSULT TO INFECTIOUS DISEASES  IP CONSULT TO GASTROENTEROLOGY    PCP:  Cy Murrell MD   [phone]    Treatment Team: Attending Provider: Jason Salazar MD; Consulting Provider: Tanna Zheng MD; Consulting Provider: Jacqueline Beltran MD; Utilization Review: Chloé Ramires RN; Consulting Provider: Cayetano Richardson MD; Care Manager: SAMUEL Sandoval    Admitting Dx:  SIRS (systemic inflammatory response syndrome) ([facility] [address]  Shock ([facility] [address]  Hypotension  Leukocytosis  Abnormal urinalysis  Dehydration       Principal Problem: Shock ([facility] [address]    * No surgery found * of      BY: * Surgery not found *             ON: * No surgery found *                  Code Status: Full Code                Advance Directives:   Advance Care Planning 2/2/2018   Patient's Healthcare Decision Maker is: Verbal statement (Legal Next of Kin remains as decision maker)   Primary Decision Maker Name Mega Haywood (daughter)   Primary Decision Maker Phone Number [phone]   Primary Decision Maker Relationship to Patient -   Secondary Decision Maker Name -   Secondary Decision Maker Phone Number -   Secondary Decision Maker Relationship to Patient -   Confirm Advance Directive None   Patient Would Like to Complete Advance Directive Yes   Does the patient have other document types -    (Send w/patient)   Yes Not W Pt       Isolation:  There are currently no Active Isolations       MDRO: No current active infections    Pain Medications given:  N/A    Last dose: N/A    Special Equipment needed: yes  Type of equipment: Oxygen         BACKGROUND     Allergies: Allergies   Allergen Reactions    Ciprofloxacin Unknown (comments)     Per [employer] paperwork.  Nsaids (Non-Steroidal Anti-Inflammatory Drug) Unknown (comments)     Per [employer] paperwork. Past Medical History:   Diagnosis Date    Arrhythmia     RAPID HEARTBEAT    Arthritis     Constipation     Hearing loss     Heart disease     Hypercholesteremia     Hypertension     Joint pain     Memory disorder        Past Surgical History:   Procedure Laterality Date    HX BACK SURGERY  1974    HX CATARACT REMOVAL      LEFT    HX COLONOSCOPY      HX HERNIA REPAIR  1975    double    HX TONSILLECTOMY      UPPER GI ENDOSCOPY,BIOPSY  3/22/2013            Prescriptions Prior to Admission   Medication Sig    furosemide (LASIX) 40 mg tablet Take 80 mg by mouth daily.  furosemide (LASIX) 40 mg tablet Take 40 mg by mouth every evening.  acetaminophen (TYLENOL) 500 mg tablet Take 500 mg by mouth two (2) times a day.  acetaminophen (TYLENOL) 325 mg tablet Take 650 mg by mouth every eight (8) hours as needed for Pain.  Menthol-Zinc Oxide (RISAMINE) 0.44-20.6 % oint Apply  to affected area two (2) times a day. Apply to buttocks.  nystatin (MYCOSTATIN) topical cream Apply  to affected area three (3) times daily. Apply to groin and sacrum every shift (7-3, 3-11, 11-7)    SENEXON-S 8.6-50 mg per tablet TAKE 1 TABLET BY MOUTH TWICE DAILY AT 9A AND 9P DX:CONSTIPATION *STOP LAXATIVES/STOOL SOFTNERS IF > 2BM IN 1 DAY (Patient taking differently: TAKE 1 TABLET BY MOUTH TWICE DAILY AT 9am AND 9p PRN)    fluticasone (FLONASE) 50 mcg/actuation nasal spray 2 Sprays by Both Nostrils route daily.  pantoprazole (PROTONIX) 40 mg tablet Take 1 tablet by mouth Before breakfast and dinner.     metoprolol (LOPRESSOR) 25 mg tablet Take 12.5 mg by mouth two (2) times a day.  rosuvastatin (CRESTOR) 20 mg tablet Take 20 mg by mouth nightly.  Menthol-Zinc Oxide (RISAMINE) 0.44-20.6 % oint Apply  to affected area as needed (skin irritation). In addition to scheduled regimen. Indications: DIAPER RASH, SKIN IRRITATION       Hard scripts included in transfer packet no    Vaccinations:    Immunization History   Administered Date(s) Administered    Tdap 01/23/2015       Readmission Risks:    Known Risks: Fall risk         The Charlson Co[employer]bitiy Index tool is an evidenced based tool that has more automatic generated information. The tool looks at many different items such as the age of the patient, how many times they were admitted in the last calendar year, current length of stay in the hospital and their diagnosis. All of these items are pulled automatically from information documented in the chart from various places and will generate a score that predicts whether a patient is at low (less than 13), medium (13-20) or high (21 or greater) risk of being readmitted.         ASSESSMENT                Temp: 98.4 °F (36.9 °C) (02/08/18 1411) Pulse (Heart Rate): 81 (02/08/18 1411)     Resp Rate: 15 (02/08/18 1411)           BP: 129/69 (02/08/18 1411)     O2 Sat (%): 98 % (02/08/18 1411)     Weight: 76.4 kg (168 lb 6.9 oz)    Height: 5' 7\" (170.2 cm) (01/31/18 2149)       If above not within 1 hour of discharge:    BP:_____  P:____  R:____ T:_____ O2 Sat: ___%  O2: ______    Active Orders   Diet    DIET REGULAR 2 GM NA (House Low NA)         Orientation: only aware of  place and person     Active Behaviors: None                                   Active Lines/Drains:  (Peg Tube / Busby / CL or S/L?): yes    Urinary Status: Incontinent, Incontinent briefs     Last BM: Last Bowel Movement Date: 02/08/18     Skin Integrity: Wound (add Wound LDA)   Wound Hip Left-DRESSING STATUS: Clean, dry, and intact  Wound Buttocks Left-DRESSING STATUS: Clean, dry, and intact  Wound Buttocks Right-DRESSING STATUS: Clean, dry, and intact    Wound Hip Left-DRESSING TYPE: Open to air  Wound Buttocks Left-DRESSING TYPE: Open to air  Wound Buttocks Right-DRESSING TYPE: Open to air    Mobility: Very limited   Weight Bearing Status: WBAT (Weight Bearing as Tolerated)                Lab Results   Component Value Date/Time    Glucose 96 02/08/2018 04:15 AM    Hemoglobin A1c 5.6 01/23/2015 07:19 PM    INR 1.1 01/31/2018 07:28 PM    INR 1.1 04/05/2017 04:50 AM    HGB 7.3 (L) 02/08/2018 04:15 AM    HGB 7.5 (L) 02/07/2018 05:07 AM        RECOMMENDATION     See After Visit Summary (AVS) for:  · Discharge instructions  · After [address]   · Special equipment needed (entered pre-discharge by Care Management)  · Medication Reconciliation    · Follow up Appointment(s)         Report given/sent by:  Christiano Kraft                    Verbal report given to:  [id number]  FAXED to:           Estimated discharge time:  2/8/2018 at 1440

## 2018-02-08 NOTE — PROGRESS NOTES
Bedside shift change report given to Maryan (oncoming nurse) by Shannan Charles (offgoing nurse). Report included the following information SBAR, Kardex and Recent Results. Pt in bed in NAD - resting.

## 2018-02-08 NOTE — PROGRESS NOTES
Bedside shift change report given to Jan (oncoming nurse) by Santi Covington (offgoing nurse). Report included the following information SBAR, Kardex and Recent Results.

## 2018-02-08 NOTE — PROGRESS NOTES
Hospital follow-up PCP transitional care appointment from SNF has been scheduled with Dr. Ayesha Melchor for Wednesday, 3/7/18 at 10:00 a.m. Pending patient discharge.   Michael Gagnon, Care Management Specialist.

## 2018-02-08 NOTE — PROGRESS NOTES
Patient is stable for discharge today and has been accepted to [facility]. Discharge instructions have been faxed and report can be called to [phone].  Referral sent to [facility] transport for 2 pm  and they responded they could come at 3 pm.  [id number] [facility], [facility]

## 2018-02-08 NOTE — PROGRESS NOTES
Discharge instructions reviewed with patient. Opportunities for questions provided. Patient discharged with all personal belongings. 1440: Tried calling report x 2 to [employer]. Will continue to try.

## 2018-02-08 NOTE — DISCHARGE SUMMARY
Discharge Summary     Patient:  Rahul Orozco       MRN: [id number]       YOB: 1935       Age: 81 y.o. Date of admission:  1/31/2018    Date of discharge:  2/8/2018    Primary care provider: Dr. Kelly Lindsay MD    Admitting provider:  Alexei Woodruff MD    Discharging provider:  Luisa Christine [address].: [phone]. If unavailable, call [phone] and ask the  to page the triage hospitalist.    Consultations  · IP CONSULT TO HOSPITALIST  · IP CONSULT TO UROLOGY  · IP CONSULT TO INFECTIOUS DISEASES  · IP CONSULT TO GASTROENTEROLOGY    Procedures  · * No surgery found *    Discharge destination: SNF. The patient is stable for discharge. Admission diagnosis  · SIRS (systemic inflammatory response syndrome) (HCC)  · Shock (HCC)  · Hypotension  · Leukocytosis  · Abnormal urinalysis  · Dehydration    Current Discharge Medication List      START taking these medications    Details   piperacillin-tazobactam (ZOSYN) 10.125 g in  mL continuous 24 hr infusion 500 mL by IntraVENous route continuously every twenty-four (24) hours for 6 days.   Qty: 3000 mL, Refills: 0      bacitracin 500 unit/gram ointment Apply 1 Packet to affected area as needed (Catheter Removal Dressing). Qty: 10 Packet, Refills: 0      balsam peru-castor oil (VENELEX) [id number] mg/gram ointment Apply  to affected area two (2) times a day. To left hip pressure ulcer  Qty: 1 Tube, Refills: 0         CONTINUE these medications which have NOT CHANGED    Details   !! furosemide (LASIX) 40 mg tablet Take 80 mg by mouth daily. !! furosemide (LASIX) 40 mg tablet Take 40 mg by mouth every evening. !! acetaminophen (TYLENOL) 500 mg tablet Take 500 mg by mouth two (2) times a day. !! acetaminophen (TYLENOL) 325 mg tablet Take 650 mg by mouth every eight (8) hours as needed for Pain.       !! Menthol-Zinc Oxide (RISAMINE) 0.44-20.6 % oint Apply  to affected area two (2) times a day. Apply to buttocks. nystatin (MYCOSTATIN) topical cream Apply  to affected area three (3) times daily. Apply to groin and sacrum every shift (7-3, 3-11, 11-7)      SENEXON-S 8.6-50 mg per tablet TAKE 1 TABLET BY MOUTH TWICE DAILY AT 9A AND 9P DX:CONSTIPATION *STOP LAXATIVES/STOOL SOFTNERS IF > 2BM IN 1 DAY  Qty: 60 Tab, Refills: 0      fluticasone (FLONASE) 50 mcg/actuation nasal spray 2 Sprays by Both Nostrils route daily. pantoprazole (PROTONIX) 40 mg tablet Take 1 tablet by mouth Before breakfast and dinner. Qty: 60 tablet, Refills: 1      metoprolol (LOPRESSOR) 25 mg tablet Take 12.5 mg by mouth two (2) times a day. rosuvastatin (CRESTOR) 20 mg tablet Take 20 mg by mouth nightly. !! Menthol-Zinc Oxide (RISAMINE) 0.44-20.6 % oint Apply  to affected area as needed (skin irritation). In addition to scheduled regimen. Indications: DIAPER RASH, SKIN IRRITATION       ! ! - Potential duplicate medications found. Please discuss with provider.           Follow-up Information     Follow up With Details Comments Contact Info    Amalia Jacobsen MD Call As needed for primary care [address]  [address] [facility] Paulo Lundy MD Schedule an appointment as soon as possible for a visit in 2 weeks For follow up of bladder thickening 60 [facility] 14 [facility] 59      Sharlene Rea MD Schedule an appointment as soon as possible for a visit in 2 weeks For follow up colonoscopy [id number]       Gregoria Yadav MD Schedule an appointment as soon as possible for a visit in 1 week For follow up of blood stream infection 6 [facility]  [phone]            Final discharge diagnoses and brief hospital course  Please also refer to the admission H&P for details on the presenting problem. An 61-year-old white male with past medical history of arthritis, arrhythmia, constipation, hearing loss, hypertension, hyperlipidemia, joint pain, anemia, memory loss, who presented to the emergency department via EMS from assisted living facility with a chief complaint of generalized body aches.      Sepsis with septic shock with E.coli and clostridial bacteremia (POA) - GI source?  - CT abdomen/pelvis 1/31 with bladder wall thickening, cholelithiasis, RP adenopathy, degenerative arthritis  - Blood cx 1/31 with clostridium perfringens, e.coli, gram positive rods, repeat 2/1, 2/7 no growth  - SBFT 2/7 without mass but poor study  - Colonoscopy as outpatient with Dr. Muna Liang  - Continue zosyn started 2/1 for 2 week course with last dose on 2/14/18  - off levophed  - Received IVF  - ID consulted - follow up with Dr. Jessica Weinstein     Pseudomonas Aeruginosa UTI (POA)  - Urine cx 1/31 with 50K CFU pseudomonas  - Continue zosyn started on 2/1     Bladder wall thickening on CT (POA) - infection vs neoplasm vs due to bladder outlet obstruction, no hematuria   - Urologist consulted - follow up with Dr. Romeo Newell for outpatient cystoscopy      Dehydration (POA) - improved with IVF  - Monitor I/O     Elevated transaminase possible due to sepsis - resolved     CKD stage 4 - creatinine improving, on IVF  - Continue to monitor renal function and urine output      Anemia of chronic disease - H/H is trending down, no evidence of active bleeding  - monitor H/H with CBC Monday 2/12/18     Hypokalemia - monitor and replace as needed     Left trochanter, Deep tissue pressure injury (POA)  - Wound care consulted - continue wound care     Debility - he said he usually stay in recliner and uses wheelchair with help  - PT/OT    FOLLOW-UP CARE RECOMMENDATIONS:     It is very important that you keep follow-up appointment(s).   Bring discharge papers, medication list (and/or medication bottles) to follow-up appointments for review by outpatient provider(s). FOLLOW-UP TESTS RECOMMENDED:   · CBC and CMP on Monday     ONGOING TREATMENT PLAN: Finish antibiotics, remove PICC, follow up with GI for colonoscopy to rule out abdominal structural abnormality as a cause of bacteremia, follow up with ID, follow up with Urology for bladder wall thickening. Specific symptoms to watch for: chest pain, shortness of breath, fever, chills, nausea, vomiting, diarrhea, change in mentation, falling, weakness, bleeding. DIET:  Cardiac Diet    ACTIVITY:  Activity as tolerated    WOUND CARE:   · Continue Sport bed (or equivalent)  · Continue Prevalon boots  · Left trochanter:  Twice daily apply Venelex  · Heels, buttocks and sacrum: Twice daily apply Aloe Vesta. Physical examination at discharge  Visit Vitals    /67 (BP 1 Location: Left arm, BP Patient Position: At rest)    Pulse 85    Temp 97.9 °F (36.6 °C)    Resp 16    Ht 5' 7\" (1.702 m)    Wt 74.2 kg (163 lb 9.3 oz)    SpO2 98%    BMI 25.62 kg/m2       Constitutional:  No acute distress, cooperative, pleasant    ENT:  Oral mucous moist, oropharynx benign. Neck supple,    Resp:  Decrease bronchial breath sound bilaterally. No wheezing/rhonchi/rales. No accessory muscle use   CV:  Regular rhythm, normal rate, no murmurs, gallops, rubs    GI:  Soft, non distended, non tender. normoactive bowel sounds, no hepatosplenomegaly     Musculoskeletal:  No edema    Neurologic:  Conscious and alert, oriented to place and person, moves all extremities                              Skin:  left heel and sacral area pressure ulcer    Pertinent imaging studies:    CXR 1/31/18  FINDINGS: A portable AP radiograph of the chest was obtained at 2013 hours. There is no pneumothorax or pleural effusion. The lungs are clear. Cardiac,  mediastinal and hilar contours are unchanged, with normal cardiac size and mild  atherosclerotic thoracic aortic tortuosity again shown. The bones are severely  osteopenic.    IMPRESSION  IMPRESSION: No acute findings. CT abdomen/pelvis 1/31/18  FINDINGS:   LUNG BASES: Minimal basilar atelectasis on the left. INCIDENTALLY IMAGED HEART AND MEDIASTINUM: Coronary vascular calcifications. LIVER: No mass or biliary dilatation. GALLBLADDER: Cholelithiasis  SPLEEN: No mass. PANCREAS: No mass or ductal dilatation. ADRENALS: Unremarkable. KIDNEYS/URETERS: Renal cortical atrophy. STOMACH: Nondistended. SMALL BOWEL: No dilatation or wall thickening. COLON: Fecal stasis. APPENDIX: Not clearly visualized. PERITONEUM: No ascites or pneumoperitoneum. RETROPERITONEUM: Aortic atherosclerotic change. Retroperitoneal adenopathy 15 x  16 mm in size. Nonspecific. REPRODUCTIVE ORGANS: The prostate is not enlarged. URINARY BLADDER: There is significant bladder wall thickening with bladder  trabeculation. This is significantly increased compared to the prior study even  considering the degree of nondistention. BONES: There is severe degenerative change in the femoral acetabular joint on  the right and on the left. Scoliosis of the lumbar spine. Spondylolisthesis in  the lumbar spine. ADDITIONAL COMMENTS: N/A     IMPRESSION  IMPRESSION:  Significant bladder wall thickening. This may be related to chronic bladder  obstruction. Neoplastic etiology is not excluded. Cholelithiasis. Severe degenerative change of the femoral acetabular joints and in the lumbar  spine. Minimal nonspecific retroperitoneal adenopathy. Indeterminate etiology. XR small bowel series 2/7/18  FINDINGS:  Preliminary radiograph of the abdomen demonstrates gas-filled,  nondilated small and large bowel loops. Moderate amount of stool in the rectum. No obstruction. There is left basilar effusion/airspace disease. There is no  evidence of bowel obstruction with transit time through the small bowel to the  cecum approximately 2.5 hours. No obstructing mass is identified.  No wall/fold  thickening is evident.     IMPRESSION  IMPRESSION:  No obstructing small bowel mass is evident. Technical factors as above. Difficult to completely exclude a small bowel mass on the basis of this study. Recent Labs      02/08/18   0415  02/07/18   0507   WBC  6.0  7.7   HGB  7.3*  7.5*   HCT  23.7*  23.6*   PLT  393  388     Recent Labs      02/08/18   0415  02/07/18   0507   NA  146*  147*   K  3.9  4.0   CL  115*  116*   CO2  23  21   BUN  19  23*   CREA  1.34*  1.58*   GLU  96  82   CA  7.5*  7.6*     Recent Labs      02/07/18   0507   SGOT  25   AP  210*   TP  5.0*   ALB  1.6*   GLOB  3.4     No results for input(s): INR, PTP, APTT in the last 72 hours. No lab exists for component: INREXT   No results for input(s): FE, TIBC, PSAT, FERR in the last 72 hours. No results for input(s): PH, PCO2, PO2 in the last 72 hours. No results for input(s): CPK, CKMB in the last 72 hours.     No lab exists for component: TROPONINI  No components found for: [place]    Chronic Diagnoses:    Problem List as of 2/8/2018  Date Reviewed: 2/1/2018          Codes Class Noted - Resolved    Dehydration ICD-10-CM: E86.0  ICD-9-CM: 276.51  1/31/2018 - Present        Leukocytosis ICD-10-CM: D72.829  ICD-9-CM: 288.60  1/31/2018 - Present        * (Principal)Shock ([facility].) ICD-10-CM: R57.9  ICD-9-CM: 785.50  1/31/2018 - Present        Hypotension ICD-10-CM: I95.9  ICD-9-CM: 458.9  1/31/2018 - Present        Abnormal urinalysis ICD-10-CM: R82.90  ICD-9-CM: 791.9  1/31/2018 - Present        SIRS (systemic inflammatory response syndrome) ([facility] ICD-10-CM: R65.10  ICD-9-CM: 995.90  1/31/2018 - Present        Venous insufficiency of both lower extremities ICD-10-CM: I87.2  ICD-9-CM: 459.81  7/31/2017 - Present        Hyperlipidemia ICD-10-CM: E78.5  ICD-9-CM: 272.4  7/31/2017 - Present        Urinary retention ICD-10-CM: R33.9  ICD-9-CM: 788.20  7/31/2017 - Present        Busby catheter in place ICD-10-CM: Z92.89  ICD-9-CM: V45.89  7/31/2017 - Present JAMES (acute kidney injury) [facility]) ICD-10-CM: N17.9  ICD-9-CM: 584.9  7/22/2017 - Present        Status post hip surgery ICD-10-CM: Z98.890  ICD-9-CM: V45.89  5/31/2017 - Present        Chronic pain of right knee ICD-10-CM: M25.561, G89.29  ICD-9-CM: 719.46, 338.29  5/31/2017 - Present        CKD (chronic kidney disease) stage 3, GFR 30-59 ml/min ICD-10-CM: N18.3  ICD-9-CM: 585.3  4/30/2017 - Present        Chronic anemia ICD-10-CM: D64.9  ICD-9-CM: 285.9  4/30/2017 - Present        Debility ICD-10-CM: R53.81  ICD-9-CM: 799.3  4/30/2017 - Present        UTI (urinary tract infection) ICD-10-CM: N39.0  ICD-9-CM: 599.0  11/7/2016 - Present        Renal failure ICD-10-CM: N19  ICD-9-CM: 824  1/23/2015 - Present        Falls ICD-10-CM: W19. Claritza Brown  ICD-9-CM: E888.9  1/23/2015 - Present        Rhabdomyolysis ICD-10-CM: M62.82  ICD-9-CM: 728.88  1/23/2015 - Present        Weight loss ICD-10-CM: R63.4  ICD-9-CM: 783.21  1/23/2015 - Present        Night sweats ICD-10-CM: R61  ICD-9-CM: 780.8  1/23/2015 - Present        Anorexia ICD-10-CM: R63.0  ICD-9-CM: 783.0  1/23/2015 - Present        Anemia ICD-10-CM: D64.9  ICD-9-CM: 285.9  1/23/2015 - Present        Weakness ICD-10-CM: R53.1  ICD-9-CM: 780.79  3/20/2013 - Present        Sinus tachycardia ICD-10-CM: R00.0  ICD-9-CM: 427.89  3/20/2013 - Present        ARF (acute renal failure) (HCC) ICD-10-CM: N17.9  ICD-9-CM: 584.9  3/20/2013 - Present        Hypercholesteremia ICD-10-CM: E78.00  ICD-9-CM: 272.0  Unknown - Present        Hypertension ICD-10-CM: I10  ICD-9-CM: 401.9  Unknown - Present        Arthritis ICD-10-CM: M19.90  ICD-9-CM: 716.90  Unknown - Present        Peripheral neuropathy ICD-10-CM: G62.9  ICD-9-CM: 356.9  3/20/2013 - Present        Tremor ICD-10-CM: R25.1  ICD-9-CM: 781.0  11/26/2012 - Present        RESOLVED: Urinary tract infection ICD-10-CM: N39.0  ICD-9-CM: 599.0  4/1/2017 - 4/7/2017        RESOLVED: Acute kidney injury ([facility] ICD-10-CM: N17.9  ICD-9-CM: 584.9  4/1/2017 - 4/7/2017        RESOLVED: Vomiting ICD-10-CM: R11.10  ICD-9-CM: 787.03  1/23/2015 - 11/10/2016        RESOLVED: Osteoarthritis of right knee (Chronic) ICD-10-CM: M17.11  ICD-9-CM: 715.96  2/11/2014 - 1/23/2015        RESOLVED: Nausea & vomiting ICD-10-CM: R11.2  ICD-9-CM: 787.01  3/20/2013 - 1/23/2015        RESOLVED: Orthostatic hypotension ICD-10-CM: I95.1  ICD-9-CM: 458.0  3/20/2013 - 1/23/2015        RESOLVED: Abdominal pain, RLQ ICD-10-CM: R10.31  ICD-9-CM: 789.03  3/20/2013 - 1/23/2015        RESOLVED: Hyperkalemia ICD-10-CM: E87.5  ICD-9-CM: 276.7  3/20/2013 - 1/23/2015        RESOLVED: Dehydration ICD-10-CM: E86.0  ICD-9-CM: 276.51  3/20/2013 - 1/23/2015        RESOLVED: Weight loss ICD-10-CM: R63.4  ICD-9-CM: 783.21  3/20/2013 - 1/23/2015        RESOLVED: Memory disorder ICD-10-CM: R41.3  ICD-9-CM: 780.93  Unknown - 1/23/2015        RESOLVED: Joint pain ICD-10-CM: M25.50  ICD-9-CM: 719.40  Unknown - 1/23/2015        RESOLVED: Insomnia ICD-10-CM: G47.00  ICD-9-CM: 780.52  3/20/2013 - 1/23/2015              Time spent on discharge related activities today greater than 30 minutes. Signed:  Bonnie Uriostegui MD                 Hospitalist, Internal Medicine      Cc:  Jeffery Lake MD

## 2018-02-12 LAB
BACTERIA SPEC CULT: NORMAL
SERVICE CMNT-IMP: NORMAL

## 2018-02-14 ENCOUNTER — EXTERNAL NURSING HOME DOCUMENTATION (OUTPATIENT)
Dept: INTERNAL MEDICINE CLINIC | Age: 83
End: 2018-02-14

## 2018-02-14 DIAGNOSIS — D64.9 CHRONIC ANEMIA: ICD-10-CM

## 2018-02-14 DIAGNOSIS — R41.3 MEMORY IMPAIRMENT: ICD-10-CM

## 2018-02-14 DIAGNOSIS — R53.1 WEAKNESS: ICD-10-CM

## 2018-02-14 DIAGNOSIS — N39.0 URINARY TRACT INFECTION WITHOUT HEMATURIA, SITE UNSPECIFIED: Primary | ICD-10-CM

## 2018-02-14 DIAGNOSIS — R53.81 DEBILITY: ICD-10-CM

## 2018-02-14 DIAGNOSIS — N18.30 CKD (CHRONIC KIDNEY DISEASE) STAGE 3, GFR 30-59 ML/MIN (HCC): ICD-10-CM

## 2018-02-14 NOTE — PROGRESS NOTES
History of Present Illness: Elvia Oliver is an 81 y.o. white man who was admitted to [facility] after hospitalization with septic shock and UTI. I have reviewed the records from the hospital. The patient has a number of chronic medical issues. He presented to the ER with generalized weakness. The patient has some memory impairment. He is a poor historian. He lives in assisted living. Currently, he tells me he is feeling weak. Otherwise, he denies any other significant problems. In the ER, he was hypotensive. BNP was over 11,00, BUN and creatine were elevated. Chest X-ray was negative. UA was positive. He was treated with IV antibiotics. Currently, the staff do not report any significant issues. PMH: Arrhythmia, DJD, constipation, hyperlipidemia, heart disease, memory loss, anemia, CKD. PSH: Back, cataracts, hernia, tonsillectomy. Allergies: Cipro and NSAIDs. SH: Former smoker, no ETOH use. He is  and lives in assisted living. He has family in town. .   FH: Heart disease, lung cancer. .   Medications:  See NH list including Tylenol, Crestor, Flonase, Lasix, Metoprolol, Nystatin cream, Protonix, Zinc Oxide, Bacitracin, Venelex, Zosyn, Sennokot. Physical Examination:   GENERAL:  Vital signs stable. Afebrile per nurse's notes. Frail, elderly man lying in bed, NAD and answering simple questions properly. HEENT:  Oral mucosa is moist. There is bitemporal wasting. NECK:  Supple, no JVD, thyromegaly or bruits. HEART:  Regular, distant sounds. LUNGS:  Diminished sounds, good air movement. ABDOMEN:  Soft, nontender, flat. EXTREMITIES:  No significant edema. DJD changes, decreased ROM   NEURO:  Generalized weakness. Studies:   Labs done two days ago show a WBC of 8.1, hemoglobin 7.6, platelets 441, BUN 23, creatinine 1.83, sodium 145, albumin 2.8. LFTs are normal.    Impression:  UTI. Septic shock, resolved. Generalized weakness. Dehydration, improved.   Chronic anemia. Memory impairment. History of arrhythmia. CKD. Debility. DJD. Plan:  Continue current medications. PT, OT. Follow labs closely. Encouraged patient to drink more fluids. Full code. I will recommend DNR to POA.      MedDATA/lula

## 2018-02-15 ENCOUNTER — EXTERNAL NURSING HOME DOCUMENTATION (OUTPATIENT)
Dept: INTERNAL MEDICINE CLINIC | Age: 83
End: 2018-02-15

## 2018-02-15 ENCOUNTER — PATIENT OUTREACH (OUTPATIENT)
Dept: CASE MANAGEMENT | Age: 83
End: 2018-02-15

## 2018-02-15 VITALS
OXYGEN SATURATION: 95 % | RESPIRATION RATE: 16 BRPM | SYSTOLIC BLOOD PRESSURE: 117 MMHG | HEART RATE: 99 BPM | TEMPERATURE: 98.7 F | DIASTOLIC BLOOD PRESSURE: 57 MMHG

## 2018-02-15 DIAGNOSIS — R78.81 BACTEREMIA: Primary | ICD-10-CM

## 2018-02-15 DIAGNOSIS — D64.9 ANEMIA, UNSPECIFIED TYPE: ICD-10-CM

## 2018-02-15 DIAGNOSIS — F03.90 DEMENTIA WITHOUT BEHAVIORAL DISTURBANCE, UNSPECIFIED DEMENTIA TYPE: ICD-10-CM

## 2018-02-15 DIAGNOSIS — N18.9 CHRONIC KIDNEY DISEASE, UNSPECIFIED CKD STAGE: ICD-10-CM

## 2018-02-15 DIAGNOSIS — E78.5 HYPERLIPIDEMIA, UNSPECIFIED HYPERLIPIDEMIA TYPE: ICD-10-CM

## 2018-02-15 DIAGNOSIS — I10 ESSENTIAL HYPERTENSION: ICD-10-CM

## 2018-02-15 DIAGNOSIS — N32.89 BLADDER WALL THICKENING: ICD-10-CM

## 2018-02-15 DIAGNOSIS — M19.90 OSTEOARTHRITIS, UNSPECIFIED OSTEOARTHRITIS TYPE, UNSPECIFIED SITE: ICD-10-CM

## 2018-02-15 NOTE — PROGRESS NOTES
Community Care Team Documentation for Patient in [facility]  Initial Follow Up       Patient was admitted to [address] [place] [address] from 1/31/18 to 2/8/18. Patient was discharged to [facility], [facility], on 2/8/18 (date). Community Care Team followed up to [address] [place] [address] during this transition. Hospital Discharge diagnosis:  Sepsis with septic shock with E coli and clostridial bacteremia. RRAT score: 23    Advance Medical Directive on file in EMR? no    Total Hospitalizations/ED visits last 6 months? IP - 1; ED - 0    PCP : Shane Deluna MD    SNF Attending:  Maddie David MD  Per Timbo Vigil at SNF, Reviewed [place] back questions to ensure that patient arrived at [facility] safely with admission packet in order. PT/OT beginning. Goals ambulate 12 ft with rolling walker at contact guard assist, toileting at mod assist.  Patient making progress toward goals. Patient on antibiotic for sepsis through 2/18. On 2 LPM O2 NC. Plan for DC to [facility] ([facility]. Date/home health TBD. Community Care Team will follow up weekly with Gareth Charles. Medications were not reconciled and general patient assessment was not completed during this skilled nursing facility outreach.      Shannen Wills, MSN, RN, ACNS-BC, [facility]  Nurse Navigator, [employer] [phone]

## 2018-02-15 NOTE — PROGRESS NOTES
THIS IS NOT A COMPLETE MEDICAL RECORD ON THIS PATIENT. THIS IS A PATIENT AT [facility]. PLEASE CONTACT THE FACILITY LISTED BELOW FOR MORE INFORMATION ON THIS PATIENT. THE COMPLETE RECORD RESIDES WITH THIS LONG TERM CARE FACILITY. HISTORY OF PRESENT ILLNESS  Amita Salmeron is a 81 y.o. male. This patient is currently under the care of Dr. Riley Diaz at [facility].  The patient was admitted to this facility following hospitalization related to sepsis with E.coli and clostridial bacteremia. He has completed a 14 day course of Zosyn. There was concern for GI source and patient was recommended to follow-up with Dr. Candice Li for outpatient colonoscopy. The patient was also noted to have bladder wall thickening on imaging during hospital stay and has been recommended to follow with urology, Dr. Amelia Hou outpatient. The patient's past medical history also includes arthritis, arrhythmia, constipation, hearing loss, HTN, HLD, anemia, dementia, and CKD. Nursing does not report any concerns or changes in condition at this time. Visit Vitals    /57    Pulse 99    Temp 98.7 °F (37.1 °C)    Resp 16    SpO2 95%       HPI    Review of Systems   Constitutional: Negative for chills and fever. HENT: Negative for congestion. Respiratory: Negative for cough and shortness of breath. Cardiovascular: Negative for chest pain and leg swelling. Gastrointestinal: Negative for abdominal pain. Genitourinary: Negative. Musculoskeletal: Negative. Skin: Negative. Neurological: Negative for dizziness and headaches. Physical Exam   Constitutional: He appears well-developed and well-nourished. No distress. HENT:   Head: Normocephalic and atraumatic. Cardiovascular: Normal rate and regular rhythm. Pulmonary/Chest: Effort normal and breath sounds normal. No respiratory distress. He has no wheezes. He has no rales. Abdominal: Soft. Bowel sounds are normal. He exhibits no distension. There is no tenderness. Musculoskeletal: He exhibits no edema. Neurological: He is alert. Oriented to self and place   Skin: Skin is warm and dry. Psychiatric: He has a normal mood and affect. Nursing note reviewed. ASSESSMENT and PLAN  Encounter Diagnoses   Name Primary?  Bacteremia Yes    Bladder wall thickening     Dementia without behavioral disturbance, unspecified dementia type     Essential hypertension     Hyperlipidemia, unspecified hyperlipidemia type     Osteoarthritis, unspecified osteoarthritis type, unspecified site     Chronic kidney disease, unspecified CKD stage     Anemia, unspecified type      Nursing to schedule follow-up with Dr. Shivam Rosenberg (infectious disease) Dr. Romulo Saldaña (urology) and Dr. Beth Garcia, (gastroenterology) as soon as possible. Continue PT/OT as tolerated. Lab results and schedule of future lab studies reviewed   Reviewed medications and side effects     Nursing to continue to monitor closely and notify MD/NP of any change in condition.

## 2018-02-21 ENCOUNTER — EXTERNAL NURSING HOME DOCUMENTATION (OUTPATIENT)
Dept: INTERNAL MEDICINE CLINIC | Age: 83
End: 2018-02-21

## 2018-02-21 VITALS
TEMPERATURE: 97.3 F | SYSTOLIC BLOOD PRESSURE: 140 MMHG | HEART RATE: 80 BPM | RESPIRATION RATE: 18 BRPM | DIASTOLIC BLOOD PRESSURE: 76 MMHG

## 2018-02-21 DIAGNOSIS — E78.5 HYPERLIPIDEMIA, UNSPECIFIED HYPERLIPIDEMIA TYPE: ICD-10-CM

## 2018-02-21 DIAGNOSIS — F03.90 DEMENTIA WITHOUT BEHAVIORAL DISTURBANCE, UNSPECIFIED DEMENTIA TYPE: ICD-10-CM

## 2018-02-21 DIAGNOSIS — N18.9 CHRONIC KIDNEY DISEASE, UNSPECIFIED CKD STAGE: ICD-10-CM

## 2018-02-21 DIAGNOSIS — D64.9 ANEMIA, UNSPECIFIED TYPE: ICD-10-CM

## 2018-02-21 DIAGNOSIS — I10 ESSENTIAL HYPERTENSION: Primary | ICD-10-CM

## 2018-02-21 NOTE — PROGRESS NOTES
THIS IS NOT A COMPLETE MEDICAL RECORD ON THIS PATIENT. THIS IS A PATIENT AT [facility]. PLEASE CONTACT THE FACILITY LISTED BELOW FOR MORE INFORMATION ON THIS PATIENT. THE COMPLETE RECORD RESIDES WITH THIS LONG TERM CARE FACILITY. HISTORY OF PRESENT ILLNESS  Geetha Ceja is a 81 y.o. male. This patient is currently under the care of Dr. Christine Ruth at [facility].  The patient was admitted to this facility following hospitalization related to sepsis with E.coli and clostridial bacteremia. He has completed a 14 day course of Zosyn. There was concern for GI source and patient was recommended to follow-up with Dr. Liana Bowen for outpatient colonoscopy. The patient was also noted to have bladder wall thickening on imaging during hospital stay and has been recommended to follow with urology, Dr. Citlaly Pederson outpatient. The patient's past medical history also includes arthritis, arrhythmia, constipation, hearing loss, HTN, HLD, anemia, dementia, and CKD. Per social work, the patient is scheduled to discharge to [facility] on 02/23/18. Patient states he is feeling well at time of visit, aside from some aching pain to right knee and leg. Visit Vitals    /76    Pulse 80    Temp 97.3 °F (36.3 °C)    Resp 18     HPI    Review of Systems   Constitutional: Negative for chills and fever. HENT: Negative for congestion. Respiratory: Negative for cough and shortness of breath. Cardiovascular: Negative for chest pain and leg swelling. Gastrointestinal: Negative for abdominal pain. Genitourinary: Negative. Musculoskeletal: Positive for joint pain. Skin: Negative. Neurological: Negative for dizziness and headaches. Physical Exam   Constitutional: He appears well-developed and well-nourished. No distress. HENT:   Head: Normocephalic and atraumatic. Cardiovascular: Normal rate and regular rhythm.     Pulmonary/Chest: Effort normal and breath sounds normal. No respiratory distress. He has no wheezes. He has no rales. Abdominal: Soft. Bowel sounds are normal. He exhibits no distension. There is no tenderness. Musculoskeletal: He exhibits no edema. Neurological: He is alert. Oriented to self and place   Skin: Skin is warm and dry. Psychiatric: He has a normal mood and affect. Nursing note reviewed. ASSESSMENT and PLAN  Encounter Diagnoses   Name Primary?  Essential hypertension Yes    Hyperlipidemia, unspecified hyperlipidemia type     Anemia, unspecified type     Chronic kidney disease, unspecified CKD stage     Dementia without behavioral disturbance, unspecified dementia type      May continue Tylenol PRN pain, as ordered. Nursing to notify if leg pain does not resolve. Patient to have home health services upon discharge. Will provide prescriptions for 30 day supply of medications at discharge. Patient to follow-up with PCP within 2 weeks of discharge and specialists as scheduled. Follow-up outpatient with gastroenterology and urology. Nursing to continue to monitor closely and notify MD/NP of any change in condition prior to discharge.

## 2018-02-22 ENCOUNTER — PATIENT OUTREACH (OUTPATIENT)
Dept: CASE MANAGEMENT | Age: 83
End: 2018-02-22

## 2018-02-22 NOTE — PROGRESS NOTES
Community Care Team Documentation for Patient in [facility]  Subsequent Follow up     Patient remains at [facility] ([facility]). See previous [facility] Team notes. PCP : Kelly Lindsay MD    Per Adarsh Cee at [facility], Plan to discharge home tomorrow 2/23 per patient request. Last PT/OT treat day 2/22. Plan to discharge to [address] [place] [address] with [facility]. Medications were not reconciled and general patient assessment was not completed during this skilled nursing facility outreach.      ARACELI AlbarranW

## 2018-02-28 PROBLEM — R41.3 MEMORY IMPAIRMENT: Status: ACTIVE | Noted: 2018-02-28

## 2018-03-01 ENCOUNTER — PATIENT OUTREACH (OUTPATIENT)
Dept: CASE MANAGEMENT | Age: 83
End: 2018-03-01

## 2018-03-02 NOTE — PROGRESS NOTES
Community Care Team Documentation for Patient in [facility]  Discharge Note    Per SNF staff, patient discharged from [facility] ([facility]). See previous [facility] Team notes. PCP : Gus Traore MD    Per Jeanett Snellen at [facility], Discharged as planned 2/23 to [facility]. .      Community Care Team will sign off at this time. Medications were not reconciled and general patient assessment was not completed during this skilled nursing facility outreach.      Domenico Villasenor, MSN, RN, ACNS-BC, [facility]  Nurse Navigator, [employer] [phone]

## 2023-06-08 NOTE — PROGRESS NOTES
Pt presents to ED with co feeling suicidal, depressed, and been drinking. Pt's supervisor from work brought him today due to patient stating to him \" I dont want to be here anymore.I want this to be the end\" Pt hasnt been to work x1 week, pt had recent hernia surgery and is co abdominal pain, pt sts last drink was this at 11/1130am today. Pt denies HI.    THIS IS NOT A COMPLETE MEDICAL RECORD ON THIS PATIENT. THIS IS A PATIENT AT [facility]. PLEASE CONTACT THE FACILITY LISTED BELOW FOR MORE INFORMATION ON THIS PATIENT. THE COMPLETE RECORD RESIDES WITH THIS LONG TERM CARE FACILITY. HISTORY OF PRESENT ILLNESS  Robert David is a [de-identified] y.o. male. This patient is currently under the care of Dr. Foreign Lund at [facility].  The patient was admitted to this facility following hospitalization related to increased lower extremity edema and JAMES. His past medical history also includes CKD, lymphedema, anemia, HTN, and HLD. The patient is currently scheduled to discharge to [facility] on Friday, September 1, 2017. The patient states he is feeling well at the time of today's visit. He denies chest pain, shortness of breath, dizziness, and GI problems at this time. The patient's vitals have been stable per nursing. Nursing does not report any concerns or changes in condition at this time. HPI    Review of Systems   Constitutional: Negative for chills and fever. HENT: Negative for congestion. Respiratory: Negative for cough and shortness of breath. Cardiovascular: Negative for chest pain and leg swelling. Gastrointestinal: Negative for abdominal pain. Genitourinary: Negative. Musculoskeletal: Negative. Skin: Negative. Neurological: Negative for dizziness and headaches. Physical Exam   Constitutional: He appears well-developed and well-nourished. No distress. HENT:   Head: Normocephalic and atraumatic. Cardiovascular: Normal rate and regular rhythm. Pulmonary/Chest: Effort normal and breath sounds normal. No respiratory distress. He has no wheezes. He has no rales. Abdominal: Soft. Bowel sounds are normal. He exhibits no distension. There is no tenderness. Musculoskeletal: He exhibits no edema. Neurological: He is alert. Oriented to self and place   Skin: Skin is warm and dry.    Psychiatric: He has a normal mood and affect. Nursing note reviewed. ASSESSMENT and PLAN  Encounter Diagnoses   Name Primary?  CKD (chronic kidney disease), unspecified stage Yes    Essential hypertension     Hyperlipidemia, unspecified hyperlipidemia type     Lymphedema     Anemia, unspecified type      Complete [facility] H&P form, per request.  Patient to have home health services upon discharge. Will provide prescriptions for 30 day supply of medications at discharge. Patient to follow-up with PCP within 2 weeks of discharge. Nursing to continue to monitor closely and notify MD/NP of any change in condition prior to discharge.
